# Patient Record
Sex: MALE | Race: WHITE | NOT HISPANIC OR LATINO | URBAN - METROPOLITAN AREA
[De-identification: names, ages, dates, MRNs, and addresses within clinical notes are randomized per-mention and may not be internally consistent; named-entity substitution may affect disease eponyms.]

---

## 2020-02-11 ENCOUNTER — EMERGENCY (EMERGENCY)
Facility: HOSPITAL | Age: 80
LOS: 1 days | Discharge: HOME | End: 2020-02-11
Attending: EMERGENCY MEDICINE | Admitting: EMERGENCY MEDICINE
Payer: MEDICARE

## 2020-02-11 VITALS
SYSTOLIC BLOOD PRESSURE: 148 MMHG | RESPIRATION RATE: 18 BRPM | HEART RATE: 82 BPM | OXYGEN SATURATION: 94 % | DIASTOLIC BLOOD PRESSURE: 81 MMHG | TEMPERATURE: 98 F

## 2020-02-11 VITALS
OXYGEN SATURATION: 97 % | SYSTOLIC BLOOD PRESSURE: 122 MMHG | DIASTOLIC BLOOD PRESSURE: 100 MMHG | RESPIRATION RATE: 26 BRPM | HEART RATE: 116 BPM

## 2020-02-11 LAB
ALBUMIN SERPL ELPH-MCNC: 3.8 G/DL — SIGNIFICANT CHANGE UP (ref 3.3–5)
ALP SERPL-CCNC: 76 U/L — SIGNIFICANT CHANGE UP (ref 40–120)
ALT FLD-CCNC: 24 U/L — SIGNIFICANT CHANGE UP (ref 10–45)
ANION GAP SERPL CALC-SCNC: 12 MMOL/L — SIGNIFICANT CHANGE UP (ref 5–17)
AST SERPL-CCNC: 17 U/L — SIGNIFICANT CHANGE UP (ref 10–40)
BASOPHILS # BLD AUTO: 0 K/UL — SIGNIFICANT CHANGE UP (ref 0–0.2)
BASOPHILS NFR BLD AUTO: 0 % — SIGNIFICANT CHANGE UP (ref 0–2)
BILIRUB SERPL-MCNC: 0.2 MG/DL — SIGNIFICANT CHANGE UP (ref 0.2–1.2)
BUN SERPL-MCNC: 28 MG/DL — HIGH (ref 7–23)
CALCIUM SERPL-MCNC: 9.3 MG/DL — SIGNIFICANT CHANGE UP (ref 8.4–10.5)
CHLORIDE SERPL-SCNC: 105 MMOL/L — SIGNIFICANT CHANGE UP (ref 96–108)
CO2 SERPL-SCNC: 25 MMOL/L — SIGNIFICANT CHANGE UP (ref 22–31)
CREAT SERPL-MCNC: 0.64 MG/DL — SIGNIFICANT CHANGE UP (ref 0.5–1.3)
EOSINOPHIL # BLD AUTO: 0 K/UL — SIGNIFICANT CHANGE UP (ref 0–0.5)
EOSINOPHIL NFR BLD AUTO: 0 % — SIGNIFICANT CHANGE UP (ref 0–6)
GLUCOSE SERPL-MCNC: 150 MG/DL — HIGH (ref 70–99)
HCT VFR BLD CALC: 40.5 % — SIGNIFICANT CHANGE UP (ref 39–50)
HGB BLD-MCNC: 12.5 G/DL — LOW (ref 13–17)
LYMPHOCYTES # BLD AUTO: 1.28 K/UL — SIGNIFICANT CHANGE UP (ref 1–3.3)
LYMPHOCYTES # BLD AUTO: 36.8 % — SIGNIFICANT CHANGE UP (ref 13–44)
MCHC RBC-ENTMCNC: 28.9 PG — SIGNIFICANT CHANGE UP (ref 27–34)
MCHC RBC-ENTMCNC: 30.9 GM/DL — LOW (ref 32–36)
MCV RBC AUTO: 93.8 FL — SIGNIFICANT CHANGE UP (ref 80–100)
MONOCYTES # BLD AUTO: 0 K/UL — SIGNIFICANT CHANGE UP (ref 0–0.9)
MONOCYTES NFR BLD AUTO: 0 % — LOW (ref 2–14)
NEUTROPHILS # BLD AUTO: 2.14 K/UL — SIGNIFICANT CHANGE UP (ref 1.8–7.4)
NEUTROPHILS NFR BLD AUTO: 61.4 % — SIGNIFICANT CHANGE UP (ref 43–77)
PLATELET # BLD AUTO: 246 K/UL — SIGNIFICANT CHANGE UP (ref 150–400)
POTASSIUM SERPL-MCNC: 4.6 MMOL/L — SIGNIFICANT CHANGE UP (ref 3.5–5.3)
POTASSIUM SERPL-SCNC: 4.6 MMOL/L — SIGNIFICANT CHANGE UP (ref 3.5–5.3)
PROT SERPL-MCNC: 6.6 G/DL — SIGNIFICANT CHANGE UP (ref 6–8.3)
RBC # BLD: 4.32 M/UL — SIGNIFICANT CHANGE UP (ref 4.2–5.8)
RBC # FLD: 15.1 % — HIGH (ref 10.3–14.5)
SODIUM SERPL-SCNC: 142 MMOL/L — SIGNIFICANT CHANGE UP (ref 135–145)
TROPONIN T SERPL-MCNC: <0.01 NG/ML — SIGNIFICANT CHANGE UP (ref 0–0.01)
WBC # BLD: 3.49 K/UL — LOW (ref 3.8–10.5)
WBC # FLD AUTO: 3.49 K/UL — LOW (ref 3.8–10.5)

## 2020-02-11 PROCEDURE — 71045 X-RAY EXAM CHEST 1 VIEW: CPT | Mod: 26

## 2020-02-11 PROCEDURE — 71275 CT ANGIOGRAPHY CHEST: CPT | Mod: 26

## 2020-02-11 PROCEDURE — 99285 EMERGENCY DEPT VISIT HI MDM: CPT

## 2020-02-11 RX ORDER — IPRATROPIUM/ALBUTEROL SULFATE 18-103MCG
3 AEROSOL WITH ADAPTER (GRAM) INHALATION
Refills: 0 | Status: COMPLETED | OUTPATIENT
Start: 2020-02-11 | End: 2020-02-11

## 2020-02-11 RX ORDER — ALBUTEROL 90 UG/1
2 AEROSOL, METERED ORAL
Qty: 16 | Refills: 0
Start: 2020-02-11 | End: 2020-03-11

## 2020-02-11 RX ADMIN — Medication 125 MILLIGRAM(S): at 02:02

## 2020-02-11 RX ADMIN — Medication 3 MILLILITER(S): at 02:02

## 2020-02-11 RX ADMIN — Medication 3 MILLILITER(S): at 02:30

## 2020-02-11 RX ADMIN — Medication 3 MILLILITER(S): at 01:55

## 2020-02-11 NOTE — ED PROVIDER NOTE - NS ED MD DISPO DISCHARGE
HPI: 
 
 
ROS Constitutional: No fever, no chills Skin: no rash Eye: No vision changes ENMT: No sore throat Respiratory: No shortness of breath, no cough Cardiovascular: No chest pain, no palpitations Gastrointestinal: No vomiting, no nausea, no diarrhea, no abdominal pain : No dysuria MSK: No back pain, no muscle pain, no joint pain Neuro: No headache, no change in mental status, no numbness, no tingling, no weakness Psych:  
Endocrine:  
All other review of systems positive per history of present illness and the above otherwise negative or noncontributory. Visit Vitals  /83  Pulse 91  Temp 97.7 °F (36.5 °C)  Resp 16  
 Ht 6' 2\" (1.88 m)  Wt 112.5 kg (248 lb)  SpO2 100%  BMI 31.84 kg/m2 Past Medical History:  
Diagnosis Date  Anxiety  Diabetes ([facility] [address] Dx 2013  
 per pt-- was never ordered to check sqbs---has a elevated A1C  
 History of kidney stones   
 multi  Hypercholesteremia hx-- off meds x several months  Nausea & vomiting 7/2015 at [facility]  
 only with last litho  Obesity (BMI 30-39.9)   
 bmi 33  
 Unspecified sleep apnea   
 pt states not a problem since wt loss---- does not own a cpap Past Surgical History:  
Procedure Laterality Date  HX LITHOTRIPSY  2/2014  
 x2  HX TONSILLECTOMY  1980S  
 HX UROLOGICAL  8/14/2014  
 cysto  HX WISDOM TEETH EXTRACTION    
 WISDOM TEETH Prior to Admission Medications Prescriptions Last Dose Informant Patient Reported? Taking? SITagliptin-metFORMIN (JANUMET) 50-1,000 mg per tablet   Yes No  
Sig: Take 1 Tab by mouth.  
escitalopram (LEXAPRO) 10 mg tablet   Yes No  
Sig: Take 10 mg by mouth every morning. Indications: GENERALIZED ANXIETY DISORDER  
ketorolac (TORADOL) 10 mg tablet   No No  
Sig: Take 1 Tab by mouth every six (6) hours as needed for Pain.  
multivitamin (ONE A DAY) tablet   Yes No  
Sig: Take 1 Tab by mouth daily. Stopped 8/18/16 potassium citrate (UROCIT-K10) 10 mEq (1,080 mg) TbER   No No  
Sig: TAKE 1 TABLET BY MOUTH TWO  TIMES DAILY  
simvastatin (ZOCOR) 10 mg tablet   Yes No  
Sig: Take  by mouth nightly. tamsulosin (FLOMAX) 0.4 mg capsule   No No  
Sig: Take 1 Cap by mouth daily. Take with supper Facility-Administered Medications: None Adult Exam  
General: alert, no acute distress Head: normocephalic, atraumatic ENT: moist mucous membranes Neck: supple, non-tender; full range of motion Cardiovascular: regular rate and rhythm, normal peripheral perfusion, no edema. Equal radial pulses Chest wall left inferior chest wall left upper abdomen with tenderness to palpation. No paradoxical chest wall movement Respiratory:  normal respirations; no wheezing, rales or rhonchi Gastrointestinal: soft, non-tender; no rebound or guarding, no peritoneal signs, no distension Back: non-tender, full range of motion Musculoskeletal: normal range of motion, normal strength, no gross deformities Neurological: alert and oriented x 4, no gross focal deficits; normal speech Psychiatric: cooperative; appropriate mood and affect MDM:atraumatic. Lungs are clear. EKG normal sinus rhythm rate of 80 with normal axis, interval.  No STEMI or ischemic changes noted. No ectopy or Brugada. Initial troponin negative. Chest x-ray unremarkable. Toradol given. Completely pain-free at this time. Labs unremarkable. No focal tenderness in epigastrium, right upper quadrant. Low suspicion for acute intra-abdominal surgical pathology. We'll obtain serial troponin and EKG at the two-hour shanon. D-dimer within normal limits. He did have recent travel to [place] for roughly 3 and half hours. There are no leg swelling. dorsalis pedis pulses bilaterally. Negative Homans bilateral lower extremities 1:33 PM 
Serial trop negative. EKG #2 normal sinus bradycardia 57 . No STEMI, ischemic changes noted. Chest pain completely resolved with Toradol. Do not suspect PE, DVT. Do not suspect dissection, pneumonia, pneumothorax. We'll discharge home. Recommend follow-up with cardiology. ED Course Xr Chest [facility] Result Date: 8/28/2018 Chest portable CLINICAL INDICATION: Acute setting severe dyspnea and left pleuritic chest pain today, history of high cholesterol COMPARISON: CT abdomen 8/16/2016 correlation TECHNIQUE: single AP portable view chest at 10:40 AM upright FINDINGS:  There is no evidence of consolidation, pneumothorax, pleural effusion or pulmonary edema. Lungs are well-inflated. The mediastinal and hilar contours are normal given technique. IMPRESSION: No acute disease. Unremarkable exam.  
 
Recent Results (from the past 24 hour(s)) EKG, 12 LEAD, INITIAL Collection Time: 08/28/18 10:30 AM  
Result Value Ref Range Ventricular Rate 80 BPM  
 Atrial Rate 86 BPM  
 P-R Interval 120 ms QRS Duration 86 ms  
 Q-T Interval 346 ms  
 QTC Calculation (Bezet) 399 ms Calculated P Axis 61 degrees Calculated R Axis 68 degrees Calculated T Axis 55 degrees Diagnosis    
  !! AGE AND GENDER SPECIFIC ECG ANALYSIS !! Normal sinus rhythm Normal ECG No previous ECGs available CBC WITH AUTOMATED DIFF Collection Time: 08/28/18 10:45 AM  
Result Value Ref Range WBC 8.3 4.3 - 11.1 K/uL  
 RBC 5.16 4.23 - 5.6 M/uL  
 HGB 14.5 13.6 - 17.2 g/dL HCT 42.8 41.1 - 50.3 % MCV 82.9 79.6 - 97.8 FL  
 MCH 28.1 26.1 - 32.9 PG  
 MCHC 33.9 31.4 - 35.0 g/dL  
 RDW 12.3 % PLATELET 615 323 - 643 K/uL MPV 11.2 9.4 - 12.3 FL ABSOLUTE NRBC 0.00 0.0 - 0.2 K/uL  
 DF AUTOMATED NEUTROPHILS 64 43 - 78 % LYMPHOCYTES 31 13 - 44 % MONOCYTES 4 4.0 - 12.0 % EOSINOPHILS 1 0.5 - 7.8 % BASOPHILS 1 0.0 - 2.0 % IMMATURE GRANULOCYTES 0 0.0 - 5.0 %  
 ABS. NEUTROPHILS 5.3 1.7 - 8.2 K/UL  
 ABS. LYMPHOCYTES 2.6 0.5 - 4.6 K/UL  
 ABS. MONOCYTES 0.3 0.1 - 1.3 K/UL ABS. EOSINOPHILS 0.1 0.0 - 0.8 K/UL  
 ABS. BASOPHILS 0.0 0.0 - 0.2 K/UL  
 ABS. IMM. GRANS. 0.0 0.0 - 0.5 K/UL METABOLIC PANEL, COMPREHENSIVE Collection Time: 08/28/18 10:45 AM  
Result Value Ref Range Sodium 139 136 - 145 mmol/L Potassium 3.7 3.5 - 5.1 mmol/L Chloride 101 98 - 107 mmol/L  
 CO2 24 21 - 32 mmol/L Anion gap 14 7 - 16 mmol/L Glucose 165 (H) 65 - 100 mg/dL BUN 13 6 - 23 MG/DL Creatinine 1.19 0.8 - 1.5 MG/DL  
 GFR est AA >60 >60 ml/min/1.73m2 GFR est non-AA >60 >60 ml/min/1.73m2 Calcium 8.7 8.3 - 10.4 MG/DL Bilirubin, total 0.3 0.2 - 1.1 MG/DL  
 ALT (SGPT) 35 12 - 65 U/L  
 AST (SGOT) 20 15 - 37 U/L Alk. phosphatase 59 50 - 136 U/L Protein, total 7.4 6.3 - 8.2 g/dL Albumin 3.8 3.5 - 5.0 g/dL Globulin 3.6 (H) 2.3 - 3.5 g/dL A-G Ratio 1.1 (L) 1.2 - 3.5    
D DIMER Collection Time: 08/28/18 10:45 AM  
Result Value Ref Range D DIMER 0.53 <0.56 ug/ml(FEU) LIPASE Collection Time: 08/28/18 10:45 AM  
Result Value Ref Range Lipase 211 73 - 393 U/L  
POC TROPONIN-I Collection Time: 08/28/18 10:50 AM  
Result Value Ref Range Troponin-I (POC) 0 (L) 0.02 - 0.05 ng/ml Dragon voice recognition software was used to create this note. Although the note has been reviewed and corrected where necessary, additional errors may have been overlooked and remain in the text. Home

## 2020-02-11 NOTE — ED ADULT NURSE NOTE - PMH
COPD (chronic obstructive pulmonary disease)    HLD (hyperlipidemia)    HTN (hypertension)    MI (myocardial infarction)

## 2020-02-11 NOTE — ED ADULT TRIAGE NOTE - CCCP TRG CHIEF CMPLNT
"Dallin Lowry  39 y.o.  Chief Complaint   Patient presents with   • Hallucinations     auditory     BIB EMS to triage ambulatory with steady gait for above. States that he has been off all his medication, \"I've been off all my medication for some time now. It wasn't working so I stopped taking it. I'm not going to take something that isn't working. I don't know how long I've been off. I don't keep track of that stuff.\"    States that the voices are telling him that someone is trying to kill him.    Denies SI/HI.    Hx. 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 When asked what happened patient refuses to engage with staff, stating \"I don't want to talk about that.\"     Refusing to make eye contact throughout triage interview even when prompted, sitting in chair with head in hand staring at floor.    Patient seen in this ED 6/3/18 for the same.    Alert Team notified of patient.  "
chest pain

## 2020-02-11 NOTE — ED PROVIDER NOTE - CLINICAL SUMMARY MEDICAL DECISION MAKING FREE TEXT BOX
afebrile afebrile. hds. hypoxia to 93-94% on RA improved s/p 2L o2 nc. no active cp. no acute resp distress. found to elevated ddimer. s/p cta chest. no e/o sepsis. no sig anemia vs electrolyte abnl. trop neg. ekg w/o acute abnl. cxr w/o acute focal consol vs ptx vs pulm edema. s/p duoneb/steroids for poss copd exacerbation. pt currently on levaquin and has inhaler at home.    dispo: trop#2, cta read, reassess o2 s/p duoneb. if cta negative, dc home w/ prednisone for likely copd exacerbation. if cta positive, admit to service. afebrile. hds. hypoxia to 93-94% on RA improved s/p 2L o2 nc. no active cp. no acute resp distress. found to elevated ddimer. s/p cta chest. no e/o sepsis. no sig anemia vs electrolyte abnl. trop neg. ekg w/o acute abnl. cxr w/o acute focal consol vs ptx vs pulm edema. s/p duoneb/steroids for poss copd exacerbation. pt currently on levaquin and has inhaler at home.  Patient with negative CTA  and second trop. Vss and NAD     dispo: trop#2, cta read, reassess o2 s/p duoneb. if cta negative, dc home w/ prednisone for likely copd exacerbation. if cta positive, admit to service. afebrile. hds. hypoxia to 93-94% on RA improved s/p 2L o2 nc. no active cp. no acute resp distress. found to elevated ddimer. s/p cta chest. no e/o sepsis. no sig anemia vs electrolyte abnl. trop neg. ekg w/o acute abnl. cxr w/o acute focal consol vs ptx vs pulm edema. s/p duoneb/steroids for poss copd exacerbation. pt currently on levaquin and has inhaler at home.  Pt well appearing, NAD and VSS. CTA neg for PE, pt feeling better. Will d/c with steroids and inhaler. Recommend follow up with PMD.   Patient with negative CTA  and second trop. Vss and NAD     dispo: trop#2, cta read, reassess o2 s/p duoneb. if cta negative, dc home w/ prednisone for likely copd exacerbation. if cta positive, admit to service.

## 2020-02-11 NOTE — ED PROVIDER NOTE - PATIENT PORTAL LINK FT
You can access the FollowMyHealth Patient Portal offered by [facility] by registering at the following website: [email or web address]. By joining [employer]’s FollowMyHealth portal, you will also be able to view your health information using other applications (apps) compatible with our system.

## 2020-02-11 NOTE — ED PROVIDER NOTE - PHYSICAL EXAMINATION
CONST: nontoxic NAD speaking in full sentences but hypoxic to 93% on RA improved s/p 2L nc  HEAD: atraumatic  EYES: conjunctivae clear  ENT: mmm  NECK: supple, no jvd  CARD: rrr no murmurs  CHEST: +few scattered end exp wheezing, no stridor/retractions/tachypnea/tripoding  ABD: soft, nd, nttp, no rebound/guarding  EXT: FROM, symmetric distal pulses intact  SKIN: warm, dry, no rash, no pedal edema/pain/rash, cap refill <2sec  NEURO: a+ox3, 5/5 strength x4, gross sensation intact x4, normal gait CONST: nontoxic NAD speaking in full sentences but hypoxic to 93-94% on RA improved s/p 2L nc  HEAD: atraumatic  EYES: conjunctivae clear  ENT: mmm  NECK: supple, no jvd  CARD: rrr no murmurs  CHEST: +few scattered end exp wheezing, no stridor/retractions/tachypnea/tripoding  ABD: soft, nd, nttp, no rebound/guarding  EXT: FROM, symmetric distal pulses intact  SKIN: warm, dry, no rash, no pedal edema/pain/rash, cap refill <2sec  NEURO: a+ox3, 5/5 strength x4, gross sensation intact x4, normal gait

## 2020-02-11 NOTE — ED PROVIDER NOTE - OBJECTIVE STATEMENT
79M daily smoker, cad s/p stents x4, htn, recently tx for uri s/p levaquin (2/9/20), c/o acute intermittent pleuritic sharp diffuse chest pain and shortness of breath that occurred around 4p today while watching tv. at baseline just prior. +chronic cough w/ white sputum. no fever/chills, no abd pain/n/v, no leg pain/swelling/rash, no phx mi, no phx dvt/pe, no antiplatelet/AC, no etoh.

## 2020-02-11 NOTE — ED ADULT NURSE NOTE - OBJECTIVE STATEMENT
Pt complains of center chest pain for about 5 hours today. Pain is described as constant "pins and needle." Severity 10/10. Nonradiated pain. Associated symptoms SOB and dizziness. Hx of 4 cardiac stents.

## 2020-02-17 DIAGNOSIS — J44.9 CHRONIC OBSTRUCTIVE PULMONARY DISEASE, UNSPECIFIED: ICD-10-CM

## 2020-02-17 DIAGNOSIS — R07.81 PLEURODYNIA: ICD-10-CM

## 2021-01-01 ENCOUNTER — EMERGENCY (EMERGENCY)
Facility: HOSPITAL | Age: 81
LOS: 1 days | Discharge: LEFT BEFORE DISCHARGE | End: 2021-01-01
Attending: EMERGENCY MEDICINE | Admitting: EMERGENCY MEDICINE
Payer: MEDICARE

## 2021-01-01 ENCOUNTER — INPATIENT (INPATIENT)
Facility: HOSPITAL | Age: 81
LOS: 4 days | DRG: 207 | End: 2021-08-23
Attending: INTERNAL MEDICINE | Admitting: HOSPITALIST
Payer: MEDICARE

## 2021-01-01 VITALS
WEIGHT: 119.93 LBS | OXYGEN SATURATION: 90 % | TEMPERATURE: 98 F | HEART RATE: 112 BPM | SYSTOLIC BLOOD PRESSURE: 116 MMHG | RESPIRATION RATE: 24 BRPM | DIASTOLIC BLOOD PRESSURE: 69 MMHG | HEIGHT: 65 IN

## 2021-01-01 VITALS
RESPIRATION RATE: 25 BRPM | DIASTOLIC BLOOD PRESSURE: 63 MMHG | WEIGHT: 130.07 LBS | TEMPERATURE: 98 F | OXYGEN SATURATION: 100 % | SYSTOLIC BLOOD PRESSURE: 113 MMHG | HEART RATE: 108 BPM

## 2021-01-01 VITALS — RESPIRATION RATE: 16 BRPM | HEART RATE: 101 BPM

## 2021-01-01 DIAGNOSIS — I26.99 OTHER PULMONARY EMBOLISM WITHOUT ACUTE COR PULMONALE: ICD-10-CM

## 2021-01-01 DIAGNOSIS — Z29.9 ENCOUNTER FOR PROPHYLACTIC MEASURES, UNSPECIFIED: ICD-10-CM

## 2021-01-01 DIAGNOSIS — E87.2 ACIDOSIS: ICD-10-CM

## 2021-01-01 DIAGNOSIS — Z51.5 ENCOUNTER FOR PALLIATIVE CARE: ICD-10-CM

## 2021-01-01 DIAGNOSIS — D49.2 NEOPLASM OF UNSPECIFIED BEHAVIOR OF BONE, SOFT TISSUE, AND SKIN: ICD-10-CM

## 2021-01-01 DIAGNOSIS — R91.8 OTHER NONSPECIFIC ABNORMAL FINDING OF LUNG FIELD: ICD-10-CM

## 2021-01-01 DIAGNOSIS — A41.9 SEPSIS, UNSPECIFIED ORGANISM: ICD-10-CM

## 2021-01-01 DIAGNOSIS — R53.2 FUNCTIONAL QUADRIPLEGIA: ICD-10-CM

## 2021-01-01 DIAGNOSIS — C34.90 MALIGNANT NEOPLASM OF UNSPECIFIED PART OF UNSPECIFIED BRONCHUS OR LUNG: ICD-10-CM

## 2021-01-01 DIAGNOSIS — R06.02 SHORTNESS OF BREATH: ICD-10-CM

## 2021-01-01 DIAGNOSIS — K92.2 GASTROINTESTINAL HEMORRHAGE, UNSPECIFIED: ICD-10-CM

## 2021-01-01 DIAGNOSIS — I10 ESSENTIAL (PRIMARY) HYPERTENSION: ICD-10-CM

## 2021-01-01 DIAGNOSIS — J96.00 ACUTE RESPIRATORY FAILURE, UNSPECIFIED WHETHER WITH HYPOXIA OR HYPERCAPNIA: ICD-10-CM

## 2021-01-01 DIAGNOSIS — R33.9 RETENTION OF URINE, UNSPECIFIED: ICD-10-CM

## 2021-01-01 DIAGNOSIS — R00.0 TACHYCARDIA, UNSPECIFIED: ICD-10-CM

## 2021-01-01 DIAGNOSIS — J44.1 CHRONIC OBSTRUCTIVE PULMONARY DISEASE WITH (ACUTE) EXACERBATION: ICD-10-CM

## 2021-01-01 DIAGNOSIS — I25.10 ATHEROSCLEROTIC HEART DISEASE OF NATIVE CORONARY ARTERY WITHOUT ANGINA PECTORIS: ICD-10-CM

## 2021-01-01 DIAGNOSIS — D64.9 ANEMIA, UNSPECIFIED: ICD-10-CM

## 2021-01-01 DIAGNOSIS — J44.9 CHRONIC OBSTRUCTIVE PULMONARY DISEASE, UNSPECIFIED: ICD-10-CM

## 2021-01-01 DIAGNOSIS — C79.51 SECONDARY MALIGNANT NEOPLASM OF BONE: ICD-10-CM

## 2021-01-01 DIAGNOSIS — R71.0 PRECIPITOUS DROP IN HEMATOCRIT: ICD-10-CM

## 2021-01-01 LAB
A1C WITH ESTIMATED AVERAGE GLUCOSE RESULT: 5.3 % — SIGNIFICANT CHANGE UP (ref 4–5.6)
ACANTHOCYTES BLD QL SMEAR: SLIGHT — SIGNIFICANT CHANGE UP
ALBUMIN SERPL ELPH-MCNC: 1.2 G/DL — LOW (ref 3.3–5)
ALBUMIN SERPL ELPH-MCNC: 1.9 G/DL — LOW (ref 3.3–5)
ALBUMIN SERPL ELPH-MCNC: 2.1 G/DL — LOW (ref 3.3–5)
ALBUMIN SERPL ELPH-MCNC: 2.1 G/DL — LOW (ref 3.3–5)
ALBUMIN SERPL ELPH-MCNC: 2.2 G/DL — LOW (ref 3.3–5)
ALBUMIN SERPL ELPH-MCNC: 2.2 G/DL — LOW (ref 3.4–5)
ALBUMIN SERPL ELPH-MCNC: 2.3 G/DL — LOW (ref 3.3–5)
ALBUMIN SERPL ELPH-MCNC: 2.4 G/DL — LOW (ref 3.3–5)
ALBUMIN SERPL ELPH-MCNC: 2.4 G/DL — LOW (ref 3.3–5)
ALBUMIN SERPL ELPH-MCNC: 2.5 G/DL — LOW (ref 3.4–5)
ALBUMIN SERPL ELPH-MCNC: 2.8 G/DL — LOW (ref 3.3–5)
ALP SERPL-CCNC: 218 U/L — HIGH (ref 40–120)
ALP SERPL-CCNC: 232 U/L — HIGH (ref 40–120)
ALP SERPL-CCNC: 263 U/L — HIGH (ref 40–120)
ALP SERPL-CCNC: 289 U/L — HIGH (ref 40–120)
ALP SERPL-CCNC: 295 U/L — HIGH (ref 40–120)
ALP SERPL-CCNC: 313 U/L — HIGH (ref 40–120)
ALP SERPL-CCNC: 351 U/L — HIGH (ref 40–120)
ALP SERPL-CCNC: 358 U/L — HIGH (ref 40–120)
ALP SERPL-CCNC: 365 U/L — HIGH (ref 40–120)
ALP SERPL-CCNC: 366 U/L — HIGH (ref 40–120)
ALP SERPL-CCNC: 407 U/L — HIGH (ref 40–120)
ALT FLD-CCNC: 10 U/L — SIGNIFICANT CHANGE UP (ref 10–45)
ALT FLD-CCNC: 153 U/L — HIGH (ref 10–45)
ALT FLD-CCNC: 17 U/L — SIGNIFICANT CHANGE UP (ref 12–42)
ALT FLD-CCNC: 18 U/L — SIGNIFICANT CHANGE UP (ref 12–42)
ALT FLD-CCNC: 195 U/L — HIGH (ref 10–45)
ALT FLD-CCNC: 250 U/L — HIGH (ref 10–45)
ALT FLD-CCNC: 333 U/L — HIGH (ref 10–45)
ALT FLD-CCNC: 342 U/L — HIGH (ref 10–45)
ALT FLD-CCNC: 3635 U/L — HIGH (ref 10–45)
ALT FLD-CCNC: 406 U/L — HIGH (ref 10–45)
ALT FLD-CCNC: 460 U/L — HIGH (ref 10–45)
ANION GAP SERPL CALC-SCNC: 11 MMOL/L — SIGNIFICANT CHANGE UP (ref 5–17)
ANION GAP SERPL CALC-SCNC: 11 MMOL/L — SIGNIFICANT CHANGE UP (ref 5–17)
ANION GAP SERPL CALC-SCNC: 12 MMOL/L — SIGNIFICANT CHANGE UP (ref 5–17)
ANION GAP SERPL CALC-SCNC: 13 MMOL/L — SIGNIFICANT CHANGE UP (ref 5–17)
ANION GAP SERPL CALC-SCNC: 13 MMOL/L — SIGNIFICANT CHANGE UP (ref 5–17)
ANION GAP SERPL CALC-SCNC: 14 MMOL/L — SIGNIFICANT CHANGE UP (ref 5–17)
ANION GAP SERPL CALC-SCNC: 14 MMOL/L — SIGNIFICANT CHANGE UP (ref 9–16)
ANION GAP SERPL CALC-SCNC: 15 MMOL/L — SIGNIFICANT CHANGE UP (ref 5–17)
ANION GAP SERPL CALC-SCNC: 16 MMOL/L — SIGNIFICANT CHANGE UP (ref 9–16)
ANION GAP SERPL CALC-SCNC: 26 MMOL/L — HIGH (ref 5–17)
ANION GAP SERPL CALC-SCNC: 32 MMOL/L — HIGH (ref 5–17)
ANISOCYTOSIS BLD QL: SIGNIFICANT CHANGE UP
ANISOCYTOSIS BLD QL: SLIGHT — SIGNIFICANT CHANGE UP
APPEARANCE UR: CLEAR — SIGNIFICANT CHANGE UP
APPEARANCE UR: CLEAR — SIGNIFICANT CHANGE UP
APTT BLD: 26 SEC — LOW (ref 27.5–35.5)
APTT BLD: 26.7 SEC — LOW (ref 27.5–35.5)
APTT BLD: 29 SEC — SIGNIFICANT CHANGE UP (ref 27.5–35.5)
APTT BLD: 38.7 SEC — HIGH (ref 27.5–35.5)
APTT BLD: 43.5 SEC — HIGH (ref 27.5–35.5)
APTT BLD: 55.1 SEC — HIGH (ref 27.5–35.5)
APTT BLD: 79.4 SEC — HIGH (ref 27.5–35.5)
AST SERPL-CCNC: 107 U/L — HIGH (ref 10–40)
AST SERPL-CCNC: 11 U/L — SIGNIFICANT CHANGE UP (ref 10–40)
AST SERPL-CCNC: 132 U/L — HIGH (ref 10–40)
AST SERPL-CCNC: 139 U/L — HIGH (ref 10–40)
AST SERPL-CCNC: 150 U/L — HIGH (ref 10–40)
AST SERPL-CCNC: 223 U/L — HIGH (ref 10–40)
AST SERPL-CCNC: 25 U/L — SIGNIFICANT CHANGE UP (ref 15–37)
AST SERPL-CCNC: 27 U/L — SIGNIFICANT CHANGE UP (ref 15–37)
AST SERPL-CCNC: 379 U/L — HIGH (ref 10–40)
AST SERPL-CCNC: 79 U/L — HIGH (ref 10–40)
AST SERPL-CCNC: >7000 U/L — SIGNIFICANT CHANGE UP (ref 10–40)
BACTERIA # UR AUTO: PRESENT /HPF
BACTERIA # UR AUTO: PRESENT /HPF
BASE EXCESS BLDA CALC-SCNC: -18.8 MMOL/L — LOW (ref -2–3)
BASE EXCESS BLDA CALC-SCNC: -2.6 MMOL/L — LOW (ref -2–3)
BASE EXCESS BLDA CALC-SCNC: -6.4 MMOL/L — LOW (ref -2–3)
BASOPHILS # BLD AUTO: 0 K/UL — SIGNIFICANT CHANGE UP (ref 0–0.2)
BASOPHILS # BLD AUTO: 0.02 K/UL — SIGNIFICANT CHANGE UP (ref 0–0.2)
BASOPHILS # BLD AUTO: 0.02 K/UL — SIGNIFICANT CHANGE UP (ref 0–0.2)
BASOPHILS # BLD AUTO: 0.03 K/UL — SIGNIFICANT CHANGE UP (ref 0–0.2)
BASOPHILS # BLD AUTO: 0.03 K/UL — SIGNIFICANT CHANGE UP (ref 0–0.2)
BASOPHILS # BLD AUTO: 0.07 K/UL — SIGNIFICANT CHANGE UP (ref 0–0.2)
BASOPHILS # BLD AUTO: 0.07 K/UL — SIGNIFICANT CHANGE UP (ref 0–0.2)
BASOPHILS # BLD AUTO: 0.14 K/UL — SIGNIFICANT CHANGE UP (ref 0–0.2)
BASOPHILS NFR BLD AUTO: 0 % — SIGNIFICANT CHANGE UP (ref 0–2)
BASOPHILS NFR BLD AUTO: 0.2 % — SIGNIFICANT CHANGE UP (ref 0–2)
BASOPHILS NFR BLD AUTO: 0.3 % — SIGNIFICANT CHANGE UP (ref 0–2)
BASOPHILS NFR BLD AUTO: 0.3 % — SIGNIFICANT CHANGE UP (ref 0–2)
BASOPHILS NFR BLD AUTO: 0.4 % — SIGNIFICANT CHANGE UP (ref 0–2)
BASOPHILS NFR BLD AUTO: 0.6 % — SIGNIFICANT CHANGE UP (ref 0–2)
BASOPHILS NFR BLD AUTO: 0.6 % — SIGNIFICANT CHANGE UP (ref 0–2)
BASOPHILS NFR BLD AUTO: 0.9 % — SIGNIFICANT CHANGE UP (ref 0–2)
BILIRUB SERPL-MCNC: 0.1 MG/DL — LOW (ref 0.2–1.2)
BILIRUB SERPL-MCNC: 0.3 MG/DL — SIGNIFICANT CHANGE UP (ref 0.2–1.2)
BILIRUB SERPL-MCNC: 0.3 MG/DL — SIGNIFICANT CHANGE UP (ref 0.2–1.2)
BILIRUB SERPL-MCNC: 0.4 MG/DL — SIGNIFICANT CHANGE UP (ref 0.2–1.2)
BILIRUB SERPL-MCNC: 0.5 MG/DL — SIGNIFICANT CHANGE UP (ref 0.2–1.2)
BILIRUB SERPL-MCNC: 0.5 MG/DL — SIGNIFICANT CHANGE UP (ref 0.2–1.2)
BILIRUB SERPL-MCNC: 0.6 MG/DL — SIGNIFICANT CHANGE UP (ref 0.2–1.2)
BILIRUB SERPL-MCNC: 0.7 MG/DL — SIGNIFICANT CHANGE UP (ref 0.2–1.2)
BILIRUB SERPL-MCNC: 1 MG/DL — SIGNIFICANT CHANGE UP (ref 0.2–1.2)
BILIRUB UR-MCNC: NEGATIVE — SIGNIFICANT CHANGE UP
BILIRUB UR-MCNC: NEGATIVE — SIGNIFICANT CHANGE UP
BLD GP AB SCN SERPL QL: NEGATIVE — SIGNIFICANT CHANGE UP
BUN SERPL-MCNC: 17 MG/DL — SIGNIFICANT CHANGE UP (ref 7–23)
BUN SERPL-MCNC: 26 MG/DL — HIGH (ref 7–23)
BUN SERPL-MCNC: 28 MG/DL — HIGH (ref 7–23)
BUN SERPL-MCNC: 30 MG/DL — HIGH (ref 7–23)
BUN SERPL-MCNC: 31 MG/DL — HIGH (ref 7–23)
BUN SERPL-MCNC: 33 MG/DL — HIGH (ref 7–23)
BUN SERPL-MCNC: 34 MG/DL — HIGH (ref 7–23)
BUN SERPL-MCNC: 35 MG/DL — HIGH (ref 7–23)
BUN SERPL-MCNC: 35 MG/DL — HIGH (ref 7–23)
BUN SERPL-MCNC: 41 MG/DL — HIGH (ref 7–23)
BUN SERPL-MCNC: 45 MG/DL — HIGH (ref 7–23)
BURR CELLS BLD QL SMEAR: PRESENT — SIGNIFICANT CHANGE UP
BURR CELLS BLD QL SMEAR: PRESENT — SIGNIFICANT CHANGE UP
CALCIUM SERPL-MCNC: 7 MG/DL — LOW (ref 8.4–10.5)
CALCIUM SERPL-MCNC: 7.3 MG/DL — LOW (ref 8.4–10.5)
CALCIUM SERPL-MCNC: 8 MG/DL — LOW (ref 8.4–10.5)
CALCIUM SERPL-MCNC: 8.1 MG/DL — LOW (ref 8.4–10.5)
CALCIUM SERPL-MCNC: 8.2 MG/DL — LOW (ref 8.4–10.5)
CALCIUM SERPL-MCNC: 8.2 MG/DL — LOW (ref 8.4–10.5)
CALCIUM SERPL-MCNC: 8.2 MG/DL — LOW (ref 8.5–10.5)
CALCIUM SERPL-MCNC: 8.8 MG/DL — SIGNIFICANT CHANGE UP (ref 8.5–10.5)
CALCIUM SERPL-MCNC: 8.9 MG/DL — SIGNIFICANT CHANGE UP (ref 8.4–10.5)
CHLORIDE SERPL-SCNC: 100 MMOL/L — SIGNIFICANT CHANGE UP (ref 96–108)
CHLORIDE SERPL-SCNC: 102 MMOL/L — SIGNIFICANT CHANGE UP (ref 96–108)
CHLORIDE SERPL-SCNC: 102 MMOL/L — SIGNIFICANT CHANGE UP (ref 96–108)
CHLORIDE SERPL-SCNC: 106 MMOL/L — SIGNIFICANT CHANGE UP (ref 96–108)
CHLORIDE SERPL-SCNC: 106 MMOL/L — SIGNIFICANT CHANGE UP (ref 96–108)
CHLORIDE SERPL-SCNC: 111 MMOL/L — HIGH (ref 96–108)
CHLORIDE SERPL-SCNC: 112 MMOL/L — HIGH (ref 96–108)
CHLORIDE SERPL-SCNC: 113 MMOL/L — HIGH (ref 96–108)
CHLORIDE SERPL-SCNC: 113 MMOL/L — HIGH (ref 96–108)
CHLORIDE SERPL-SCNC: 114 MMOL/L — HIGH (ref 96–108)
CHLORIDE SERPL-SCNC: 115 MMOL/L — HIGH (ref 96–108)
CHOLEST SERPL-MCNC: 86 MG/DL — SIGNIFICANT CHANGE UP
CK MB CFR SERPL CALC: 3.1 NG/ML — SIGNIFICANT CHANGE UP (ref 0–6.7)
CK MB CFR SERPL CALC: 6.6 NG/ML — SIGNIFICANT CHANGE UP (ref 0–6.7)
CK SERPL-CCNC: 48 U/L — SIGNIFICANT CHANGE UP (ref 30–200)
CK SERPL-CCNC: 625 U/L — HIGH (ref 30–200)
CK SERPL-CCNC: 91 U/L — SIGNIFICANT CHANGE UP (ref 39–308)
CO2 BLDA-SCNC: 19 MMOL/L — SIGNIFICANT CHANGE UP (ref 19–24)
CO2 BLDA-SCNC: 23 MMOL/L — SIGNIFICANT CHANGE UP (ref 19–24)
CO2 BLDA-SCNC: 8 MMOL/L — LOW (ref 19–24)
CO2 SERPL-SCNC: 18 MMOL/L — LOW (ref 22–31)
CO2 SERPL-SCNC: 19 MMOL/L — LOW (ref 22–31)
CO2 SERPL-SCNC: 19 MMOL/L — LOW (ref 22–31)
CO2 SERPL-SCNC: 20 MMOL/L — LOW (ref 22–31)
CO2 SERPL-SCNC: 21 MMOL/L — LOW (ref 22–31)
CO2 SERPL-SCNC: 22 MMOL/L — SIGNIFICANT CHANGE UP (ref 22–31)
CO2 SERPL-SCNC: 23 MMOL/L — SIGNIFICANT CHANGE UP (ref 22–31)
CO2 SERPL-SCNC: 26 MMOL/L — SIGNIFICANT CHANGE UP (ref 22–31)
CO2 SERPL-SCNC: 26 MMOL/L — SIGNIFICANT CHANGE UP (ref 22–31)
CO2 SERPL-SCNC: 29 MMOL/L — SIGNIFICANT CHANGE UP (ref 22–31)
CO2 SERPL-SCNC: 8 MMOL/L — CRITICAL LOW (ref 22–31)
COLOR SPEC: YELLOW — SIGNIFICANT CHANGE UP
COLOR SPEC: YELLOW — SIGNIFICANT CHANGE UP
CREAT SERPL-MCNC: 0.62 MG/DL — SIGNIFICANT CHANGE UP (ref 0.5–1.3)
CREAT SERPL-MCNC: 0.68 MG/DL — SIGNIFICANT CHANGE UP (ref 0.5–1.3)
CREAT SERPL-MCNC: 0.7 MG/DL — SIGNIFICANT CHANGE UP (ref 0.5–1.3)
CREAT SERPL-MCNC: 0.71 MG/DL — SIGNIFICANT CHANGE UP (ref 0.5–1.3)
CREAT SERPL-MCNC: 0.75 MG/DL — SIGNIFICANT CHANGE UP (ref 0.5–1.3)
CREAT SERPL-MCNC: 0.82 MG/DL — SIGNIFICANT CHANGE UP (ref 0.5–1.3)
CREAT SERPL-MCNC: 0.86 MG/DL — SIGNIFICANT CHANGE UP (ref 0.5–1.3)
CREAT SERPL-MCNC: 0.95 MG/DL — SIGNIFICANT CHANGE UP (ref 0.5–1.3)
CREAT SERPL-MCNC: 0.99 MG/DL — SIGNIFICANT CHANGE UP (ref 0.5–1.3)
CREAT SERPL-MCNC: 1.06 MG/DL — SIGNIFICANT CHANGE UP (ref 0.5–1.3)
CREAT SERPL-MCNC: 1.19 MG/DL — SIGNIFICANT CHANGE UP (ref 0.5–1.3)
DACRYOCYTES BLD QL SMEAR: SIGNIFICANT CHANGE UP
DACRYOCYTES BLD QL SMEAR: SLIGHT — SIGNIFICANT CHANGE UP
DACRYOCYTES BLD QL SMEAR: SLIGHT — SIGNIFICANT CHANGE UP
DIFF PNL FLD: ABNORMAL
DIFF PNL FLD: NEGATIVE — SIGNIFICANT CHANGE UP
ELLIPTOCYTES BLD QL SMEAR: SIGNIFICANT CHANGE UP
ELLIPTOCYTES BLD QL SMEAR: SLIGHT — SIGNIFICANT CHANGE UP
EOSINOPHIL # BLD AUTO: 0 K/UL — SIGNIFICANT CHANGE UP (ref 0–0.5)
EOSINOPHIL # BLD AUTO: 0.06 K/UL — SIGNIFICANT CHANGE UP (ref 0–0.5)
EOSINOPHIL # BLD AUTO: 0.06 K/UL — SIGNIFICANT CHANGE UP (ref 0–0.5)
EOSINOPHIL NFR BLD AUTO: 0 % — SIGNIFICANT CHANGE UP (ref 0–6)
EOSINOPHIL NFR BLD AUTO: 0.8 % — SIGNIFICANT CHANGE UP (ref 0–6)
EOSINOPHIL NFR BLD AUTO: 1 % — SIGNIFICANT CHANGE UP (ref 0–6)
EPI CELLS # UR: SIGNIFICANT CHANGE UP /HPF (ref 0–5)
EPI CELLS # UR: SIGNIFICANT CHANGE UP /HPF (ref 0–5)
ESTIMATED AVERAGE GLUCOSE: 105 MG/DL — SIGNIFICANT CHANGE UP (ref 68–114)
FLUAV H1 2009 PAND RNA SPEC QL NAA+PROBE: SIGNIFICANT CHANGE UP
FLUAV H1 RNA SPEC QL NAA+PROBE: SIGNIFICANT CHANGE UP
FLUAV H3 RNA SPEC QL NAA+PROBE: SIGNIFICANT CHANGE UP
FLUAV SUBTYP SPEC NAA+PROBE: SIGNIFICANT CHANGE UP
FLUBV RNA SPEC QL NAA+PROBE: SIGNIFICANT CHANGE UP
GAS PNL BLDA: SIGNIFICANT CHANGE UP
GAS PNL BLDA: SIGNIFICANT CHANGE UP
GGT SERPL-CCNC: 25 U/L — SIGNIFICANT CHANGE UP (ref 9–50)
GIANT PLATELETS BLD QL SMEAR: PRESENT — SIGNIFICANT CHANGE UP
GLUCOSE BLDC GLUCOMTR-MCNC: 100 MG/DL — HIGH (ref 70–99)
GLUCOSE BLDC GLUCOMTR-MCNC: 100 MG/DL — HIGH (ref 70–99)
GLUCOSE BLDC GLUCOMTR-MCNC: 105 MG/DL — HIGH (ref 70–99)
GLUCOSE BLDC GLUCOMTR-MCNC: 110 MG/DL — HIGH (ref 70–99)
GLUCOSE BLDC GLUCOMTR-MCNC: 111 MG/DL — HIGH (ref 70–99)
GLUCOSE BLDC GLUCOMTR-MCNC: 118 MG/DL — HIGH (ref 70–99)
GLUCOSE BLDC GLUCOMTR-MCNC: 152 MG/DL — HIGH (ref 70–99)
GLUCOSE BLDC GLUCOMTR-MCNC: 180 MG/DL — HIGH (ref 70–99)
GLUCOSE BLDC GLUCOMTR-MCNC: 200 MG/DL — HIGH (ref 70–99)
GLUCOSE BLDC GLUCOMTR-MCNC: 22 MG/DL — CRITICAL LOW (ref 70–99)
GLUCOSE BLDC GLUCOMTR-MCNC: 224 MG/DL — HIGH (ref 70–99)
GLUCOSE BLDC GLUCOMTR-MCNC: 244 MG/DL — HIGH (ref 70–99)
GLUCOSE BLDC GLUCOMTR-MCNC: 250 MG/DL — HIGH (ref 70–99)
GLUCOSE BLDC GLUCOMTR-MCNC: 27 MG/DL — CRITICAL LOW (ref 70–99)
GLUCOSE BLDC GLUCOMTR-MCNC: 35 MG/DL — CRITICAL LOW (ref 70–99)
GLUCOSE BLDC GLUCOMTR-MCNC: 419 MG/DL — HIGH (ref 70–99)
GLUCOSE BLDC GLUCOMTR-MCNC: 42 MG/DL — CRITICAL LOW (ref 70–99)
GLUCOSE BLDC GLUCOMTR-MCNC: 61 MG/DL — LOW (ref 70–99)
GLUCOSE BLDC GLUCOMTR-MCNC: 81 MG/DL — SIGNIFICANT CHANGE UP (ref 70–99)
GLUCOSE BLDC GLUCOMTR-MCNC: 89 MG/DL — SIGNIFICANT CHANGE UP (ref 70–99)
GLUCOSE BLDC GLUCOMTR-MCNC: 97 MG/DL — SIGNIFICANT CHANGE UP (ref 70–99)
GLUCOSE BLDC GLUCOMTR-MCNC: <10 MG/DL — CRITICAL LOW (ref 70–99)
GLUCOSE BLDC GLUCOMTR-MCNC: <10 MG/DL — CRITICAL LOW (ref 70–99)
GLUCOSE SERPL-MCNC: 113 MG/DL — HIGH (ref 70–99)
GLUCOSE SERPL-MCNC: 113 MG/DL — HIGH (ref 70–99)
GLUCOSE SERPL-MCNC: 115 MG/DL — HIGH (ref 70–99)
GLUCOSE SERPL-MCNC: 121 MG/DL — HIGH (ref 70–99)
GLUCOSE SERPL-MCNC: 148 MG/DL — HIGH (ref 70–99)
GLUCOSE SERPL-MCNC: 151 MG/DL — HIGH (ref 70–99)
GLUCOSE SERPL-MCNC: 175 MG/DL — HIGH (ref 70–99)
GLUCOSE SERPL-MCNC: 192 MG/DL — HIGH (ref 70–99)
GLUCOSE SERPL-MCNC: 263 MG/DL — HIGH (ref 70–99)
GLUCOSE SERPL-MCNC: 328 MG/DL — HIGH (ref 70–99)
GLUCOSE SERPL-MCNC: 83 MG/DL — SIGNIFICANT CHANGE UP (ref 70–99)
GLUCOSE UR QL: NEGATIVE — SIGNIFICANT CHANGE UP
GLUCOSE UR QL: NEGATIVE — SIGNIFICANT CHANGE UP
GRAN CASTS # UR COMP ASSIST: ABNORMAL /LPF
HAPTOGLOB SERPL-MCNC: 352 MG/DL — HIGH (ref 34–200)
HCO3 BLDA-SCNC: 18 MMOL/L — LOW (ref 21–28)
HCO3 BLDA-SCNC: 22 MMOL/L — SIGNIFICANT CHANGE UP (ref 21–28)
HCO3 BLDA-SCNC: 7 MMOL/L — CRITICAL LOW (ref 21–28)
HCT VFR BLD CALC: 22.3 % — LOW (ref 39–50)
HCT VFR BLD CALC: 22.8 % — LOW (ref 39–50)
HCT VFR BLD CALC: 23.7 % — LOW (ref 39–50)
HCT VFR BLD CALC: 25.6 % — LOW (ref 39–50)
HCT VFR BLD CALC: 25.8 % — LOW (ref 39–50)
HCT VFR BLD CALC: 26.1 % — LOW (ref 39–50)
HCT VFR BLD CALC: 26.8 % — LOW (ref 39–50)
HCT VFR BLD CALC: 27.4 % — LOW (ref 39–50)
HCT VFR BLD CALC: 27.5 % — LOW (ref 39–50)
HCT VFR BLD CALC: 28 % — LOW (ref 39–50)
HCT VFR BLD CALC: 28.1 % — LOW (ref 39–50)
HCT VFR BLD CALC: 28.2 % — LOW (ref 39–50)
HCT VFR BLD CALC: 28.7 % — LOW (ref 39–50)
HCT VFR BLD CALC: 31.2 % — LOW (ref 39–50)
HDLC SERPL-MCNC: 32 MG/DL — LOW
HGB BLD-MCNC: 6.3 G/DL — CRITICAL LOW (ref 13–17)
HGB BLD-MCNC: 6.3 G/DL — CRITICAL LOW (ref 13–17)
HGB BLD-MCNC: 7.5 G/DL — LOW (ref 13–17)
HGB BLD-MCNC: 8 G/DL — LOW (ref 13–17)
HGB BLD-MCNC: 8.1 G/DL — LOW (ref 13–17)
HGB BLD-MCNC: 8.4 G/DL — LOW (ref 13–17)
HGB BLD-MCNC: 8.7 G/DL — LOW (ref 13–17)
HGB BLD-MCNC: 8.7 G/DL — LOW (ref 13–17)
HGB BLD-MCNC: 8.8 G/DL — LOW (ref 13–17)
HGB BLD-MCNC: 8.9 G/DL — LOW (ref 13–17)
HGB BLD-MCNC: 9.1 G/DL — LOW (ref 13–17)
HGB BLD-MCNC: 9.3 G/DL — LOW (ref 13–17)
HYPOCHROMIA BLD QL: SIGNIFICANT CHANGE UP
HYPOCHROMIA BLD QL: SLIGHT — SIGNIFICANT CHANGE UP
IMM GRANULOCYTES NFR BLD AUTO: 11.2 % — HIGH (ref 0–1.5)
IMM GRANULOCYTES NFR BLD AUTO: 12.5 % — HIGH (ref 0–1.5)
IMM GRANULOCYTES NFR BLD AUTO: 15.3 % — HIGH (ref 0–1.5)
IMM GRANULOCYTES NFR BLD AUTO: 16.2 % — HIGH (ref 0–1.5)
IMM GRANULOCYTES NFR BLD AUTO: 20.8 % — HIGH (ref 0–1.5)
IMM GRANULOCYTES NFR BLD AUTO: 25 % — HIGH (ref 0–1.5)
INR BLD: 1.35 — HIGH (ref 0.88–1.16)
INR BLD: 1.37 — HIGH (ref 0.88–1.16)
INR BLD: 1.52 — HIGH (ref 0.88–1.16)
INR BLD: 1.77 — HIGH (ref 0.88–1.16)
INR BLD: 4.26 — HIGH (ref 0.88–1.16)
KETONES UR-MCNC: NEGATIVE — SIGNIFICANT CHANGE UP
KETONES UR-MCNC: NEGATIVE — SIGNIFICANT CHANGE UP
LACTATE SERPL-SCNC: 17 MMOL/L — CRITICAL HIGH (ref 0.5–2)
LACTATE SERPL-SCNC: 2.3 MMOL/L — HIGH (ref 0.5–2)
LACTATE SERPL-SCNC: 2.5 MMOL/L — HIGH (ref 0.5–2)
LACTATE SERPL-SCNC: 2.8 MMOL/L — HIGH (ref 0.5–2)
LACTATE SERPL-SCNC: 3.2 MMOL/L — HIGH (ref 0.5–2)
LACTATE SERPL-SCNC: 3.3 MMOL/L — HIGH (ref 0.5–2)
LACTATE SERPL-SCNC: 3.4 MMOL/L — HIGH (ref 0.5–2)
LACTATE SERPL-SCNC: 3.5 MMOL/L — HIGH (ref 0.5–2)
LACTATE SERPL-SCNC: 3.5 MMOL/L — HIGH (ref 0.5–2)
LACTATE SERPL-SCNC: 4.2 MMOL/L — CRITICAL HIGH (ref 0.5–2)
LACTATE SERPL-SCNC: 5 MMOL/L — CRITICAL HIGH (ref 0.4–2)
LDH SERPL L TO P-CCNC: 197 U/L — SIGNIFICANT CHANGE UP (ref 50–242)
LDH SERPL L TO P-CCNC: 821 U/L — HIGH (ref 50–242)
LEUKOCYTE ESTERASE UR-ACNC: NEGATIVE — SIGNIFICANT CHANGE UP
LEUKOCYTE ESTERASE UR-ACNC: NEGATIVE — SIGNIFICANT CHANGE UP
LIDOCAIN IGE QN: 67 U/L — LOW (ref 73–393)
LIPID PNL WITH DIRECT LDL SERPL: 7 MG/DL — SIGNIFICANT CHANGE UP
LYMPHOCYTES # BLD AUTO: 0.23 K/UL — LOW (ref 1–3.3)
LYMPHOCYTES # BLD AUTO: 0.26 K/UL — LOW (ref 1–3.3)
LYMPHOCYTES # BLD AUTO: 0.3 K/UL — LOW (ref 1–3.3)
LYMPHOCYTES # BLD AUTO: 0.35 K/UL — LOW (ref 1–3.3)
LYMPHOCYTES # BLD AUTO: 0.42 K/UL — LOW (ref 1–3.3)
LYMPHOCYTES # BLD AUTO: 0.63 K/UL — LOW (ref 1–3.3)
LYMPHOCYTES # BLD AUTO: 0.72 K/UL — LOW (ref 1–3.3)
LYMPHOCYTES # BLD AUTO: 0.77 K/UL — LOW (ref 1–3.3)
LYMPHOCYTES # BLD AUTO: 0.85 K/UL — LOW (ref 1–3.3)
LYMPHOCYTES # BLD AUTO: 1.46 K/UL — SIGNIFICANT CHANGE UP (ref 1–3.3)
LYMPHOCYTES # BLD AUTO: 1.49 K/UL — SIGNIFICANT CHANGE UP (ref 1–3.3)
LYMPHOCYTES # BLD AUTO: 1.64 K/UL — SIGNIFICANT CHANGE UP (ref 1–3.3)
LYMPHOCYTES # BLD AUTO: 1.7 % — LOW (ref 13–44)
LYMPHOCYTES # BLD AUTO: 1.9 % — LOW (ref 13–44)
LYMPHOCYTES # BLD AUTO: 11 % — LOW (ref 13–44)
LYMPHOCYTES # BLD AUTO: 2.4 % — LOW (ref 13–44)
LYMPHOCYTES # BLD AUTO: 2.6 % — LOW (ref 13–44)
LYMPHOCYTES # BLD AUTO: 20 % — SIGNIFICANT CHANGE UP (ref 13–44)
LYMPHOCYTES # BLD AUTO: 23 % — SIGNIFICANT CHANGE UP (ref 13–44)
LYMPHOCYTES # BLD AUTO: 25 % — SIGNIFICANT CHANGE UP (ref 13–44)
LYMPHOCYTES # BLD AUTO: 4.4 % — LOW (ref 13–44)
LYMPHOCYTES # BLD AUTO: 6.5 % — LOW (ref 13–44)
LYMPHOCYTES # BLD AUTO: 7.5 % — LOW (ref 13–44)
LYMPHOCYTES # BLD AUTO: 9 % — LOW (ref 13–44)
MACROCYTES BLD QL: SLIGHT — SIGNIFICANT CHANGE UP
MAGNESIUM SERPL-MCNC: 1.9 MG/DL — SIGNIFICANT CHANGE UP (ref 1.6–2.6)
MAGNESIUM SERPL-MCNC: 2 MG/DL — SIGNIFICANT CHANGE UP (ref 1.6–2.6)
MAGNESIUM SERPL-MCNC: 2 MG/DL — SIGNIFICANT CHANGE UP (ref 1.6–2.6)
MAGNESIUM SERPL-MCNC: 2.1 MG/DL — SIGNIFICANT CHANGE UP (ref 1.6–2.6)
MAGNESIUM SERPL-MCNC: 2.5 MG/DL — SIGNIFICANT CHANGE UP (ref 1.6–2.6)
MAGNESIUM SERPL-MCNC: 2.6 MG/DL — SIGNIFICANT CHANGE UP (ref 1.6–2.6)
MANUAL SMEAR VERIFICATION: SIGNIFICANT CHANGE UP
MCHC RBC-ENTMCNC: 26.3 PG — LOW (ref 27–34)
MCHC RBC-ENTMCNC: 26.8 PG — LOW (ref 27–34)
MCHC RBC-ENTMCNC: 27.1 PG — SIGNIFICANT CHANGE UP (ref 27–34)
MCHC RBC-ENTMCNC: 27.6 GM/DL — LOW (ref 32–36)
MCHC RBC-ENTMCNC: 28 PG — SIGNIFICANT CHANGE UP (ref 27–34)
MCHC RBC-ENTMCNC: 28.3 GM/DL — LOW (ref 32–36)
MCHC RBC-ENTMCNC: 28.3 PG — SIGNIFICANT CHANGE UP (ref 27–34)
MCHC RBC-ENTMCNC: 28.5 PG — SIGNIFICANT CHANGE UP (ref 27–34)
MCHC RBC-ENTMCNC: 28.6 PG — SIGNIFICANT CHANGE UP (ref 27–34)
MCHC RBC-ENTMCNC: 28.7 PG — SIGNIFICANT CHANGE UP (ref 27–34)
MCHC RBC-ENTMCNC: 28.9 PG — SIGNIFICANT CHANGE UP (ref 27–34)
MCHC RBC-ENTMCNC: 28.9 PG — SIGNIFICANT CHANGE UP (ref 27–34)
MCHC RBC-ENTMCNC: 29 PG — SIGNIFICANT CHANGE UP (ref 27–34)
MCHC RBC-ENTMCNC: 29.2 GM/DL — LOW (ref 32–36)
MCHC RBC-ENTMCNC: 29.5 PG — SIGNIFICANT CHANGE UP (ref 27–34)
MCHC RBC-ENTMCNC: 30.7 GM/DL — LOW (ref 32–36)
MCHC RBC-ENTMCNC: 31.4 GM/DL — LOW (ref 32–36)
MCHC RBC-ENTMCNC: 31.6 GM/DL — LOW (ref 32–36)
MCHC RBC-ENTMCNC: 31.6 GM/DL — LOW (ref 32–36)
MCHC RBC-ENTMCNC: 31.7 GM/DL — LOW (ref 32–36)
MCHC RBC-ENTMCNC: 31.8 GM/DL — LOW (ref 32–36)
MCHC RBC-ENTMCNC: 31.8 GM/DL — LOW (ref 32–36)
MCHC RBC-ENTMCNC: 32.3 GM/DL — SIGNIFICANT CHANGE UP (ref 32–36)
MCHC RBC-ENTMCNC: 32.8 GM/DL — SIGNIFICANT CHANGE UP (ref 32–36)
MCHC RBC-ENTMCNC: 32.8 GM/DL — SIGNIFICANT CHANGE UP (ref 32–36)
MCHC RBC-ENTMCNC: 33.1 GM/DL — SIGNIFICANT CHANGE UP (ref 32–36)
MCV RBC AUTO: 87.5 FL — SIGNIFICANT CHANGE UP (ref 80–100)
MCV RBC AUTO: 87.9 FL — SIGNIFICANT CHANGE UP (ref 80–100)
MCV RBC AUTO: 88.5 FL — SIGNIFICANT CHANGE UP (ref 80–100)
MCV RBC AUTO: 89.3 FL — SIGNIFICANT CHANGE UP (ref 80–100)
MCV RBC AUTO: 89.5 FL — SIGNIFICANT CHANGE UP (ref 80–100)
MCV RBC AUTO: 89.8 FL — SIGNIFICANT CHANGE UP (ref 80–100)
MCV RBC AUTO: 90 FL — SIGNIFICANT CHANGE UP (ref 80–100)
MCV RBC AUTO: 90.2 FL — SIGNIFICANT CHANGE UP (ref 80–100)
MCV RBC AUTO: 90.3 FL — SIGNIFICANT CHANGE UP (ref 80–100)
MCV RBC AUTO: 90.8 FL — SIGNIFICANT CHANGE UP (ref 80–100)
MCV RBC AUTO: 91.2 FL — SIGNIFICANT CHANGE UP (ref 80–100)
MCV RBC AUTO: 94.9 FL — SIGNIFICANT CHANGE UP (ref 80–100)
MCV RBC AUTO: 95 FL — SIGNIFICANT CHANGE UP (ref 80–100)
MCV RBC AUTO: 96 FL — SIGNIFICANT CHANGE UP (ref 80–100)
METAMYELOCYTES # FLD: 1 % — HIGH (ref 0–0)
METAMYELOCYTES # FLD: 1.9 % — HIGH (ref 0–0)
METAMYELOCYTES # FLD: 4 % — HIGH (ref 0–0)
METAMYELOCYTES # FLD: 4.4 % — HIGH (ref 0–0)
METAMYELOCYTES # FLD: 5.3 % — HIGH (ref 0–0)
MICROCYTES BLD QL: SLIGHT — SIGNIFICANT CHANGE UP
MONOCYTES # BLD AUTO: 0.09 K/UL — SIGNIFICANT CHANGE UP (ref 0–0.9)
MONOCYTES # BLD AUTO: 0.14 K/UL — SIGNIFICANT CHANGE UP (ref 0–0.9)
MONOCYTES # BLD AUTO: 0.14 K/UL — SIGNIFICANT CHANGE UP (ref 0–0.9)
MONOCYTES # BLD AUTO: 0.21 K/UL — SIGNIFICANT CHANGE UP (ref 0–0.9)
MONOCYTES # BLD AUTO: 0.22 K/UL — SIGNIFICANT CHANGE UP (ref 0–0.9)
MONOCYTES # BLD AUTO: 0.32 K/UL — SIGNIFICANT CHANGE UP (ref 0–0.9)
MONOCYTES # BLD AUTO: 0.33 K/UL — SIGNIFICANT CHANGE UP (ref 0–0.9)
MONOCYTES # BLD AUTO: 0.39 K/UL — SIGNIFICANT CHANGE UP (ref 0–0.9)
MONOCYTES # BLD AUTO: 0.42 K/UL — SIGNIFICANT CHANGE UP (ref 0–0.9)
MONOCYTES # BLD AUTO: 0.55 K/UL — SIGNIFICANT CHANGE UP (ref 0–0.9)
MONOCYTES # BLD AUTO: 0.58 K/UL — SIGNIFICANT CHANGE UP (ref 0–0.9)
MONOCYTES # BLD AUTO: 1.9 K/UL — HIGH (ref 0–0.9)
MONOCYTES NFR BLD AUTO: 0.9 % — LOW (ref 2–14)
MONOCYTES NFR BLD AUTO: 1.8 % — LOW (ref 2–14)
MONOCYTES NFR BLD AUTO: 10 % — SIGNIFICANT CHANGE UP (ref 2–14)
MONOCYTES NFR BLD AUTO: 2.6 % — SIGNIFICANT CHANGE UP (ref 2–14)
MONOCYTES NFR BLD AUTO: 23.7 % — HIGH (ref 2–14)
MONOCYTES NFR BLD AUTO: 3 % — SIGNIFICANT CHANGE UP (ref 2–14)
MONOCYTES NFR BLD AUTO: 3.3 % — SIGNIFICANT CHANGE UP (ref 2–14)
MONOCYTES NFR BLD AUTO: 4.9 % — SIGNIFICANT CHANGE UP (ref 2–14)
MONOCYTES NFR BLD AUTO: 5.2 % — SIGNIFICANT CHANGE UP (ref 2–14)
MONOCYTES NFR BLD AUTO: 7.1 % — SIGNIFICANT CHANGE UP (ref 2–14)
MYELOCYTES NFR BLD: 0.9 % — HIGH (ref 0–0)
MYELOCYTES NFR BLD: 2 % — HIGH (ref 0–0)
NEUTROPHILS # BLD AUTO: 14.16 K/UL — HIGH (ref 1.8–7.4)
NEUTROPHILS # BLD AUTO: 14.42 K/UL — HIGH (ref 1.8–7.4)
NEUTROPHILS # BLD AUTO: 3.56 K/UL — SIGNIFICANT CHANGE UP (ref 1.8–7.4)
NEUTROPHILS # BLD AUTO: 4.41 K/UL — SIGNIFICANT CHANGE UP (ref 1.8–7.4)
NEUTROPHILS # BLD AUTO: 5.01 K/UL — SIGNIFICANT CHANGE UP (ref 1.8–7.4)
NEUTROPHILS # BLD AUTO: 5.07 K/UL — SIGNIFICANT CHANGE UP (ref 1.8–7.4)
NEUTROPHILS # BLD AUTO: 5.62 K/UL — SIGNIFICANT CHANGE UP (ref 1.8–7.4)
NEUTROPHILS # BLD AUTO: 6.31 K/UL — SIGNIFICANT CHANGE UP (ref 1.8–7.4)
NEUTROPHILS # BLD AUTO: 7.47 K/UL — HIGH (ref 1.8–7.4)
NEUTROPHILS # BLD AUTO: 8.46 K/UL — HIGH (ref 1.8–7.4)
NEUTROPHILS # BLD AUTO: 8.69 K/UL — HIGH (ref 1.8–7.4)
NEUTROPHILS # BLD AUTO: 9.2 K/UL — HIGH (ref 1.8–7.4)
NEUTROPHILS NFR BLD AUTO: 58 % — SIGNIFICANT CHANGE UP (ref 43–77)
NEUTROPHILS NFR BLD AUTO: 59.2 % — SIGNIFICANT CHANGE UP (ref 43–77)
NEUTROPHILS NFR BLD AUTO: 64 % — SIGNIFICANT CHANGE UP (ref 43–77)
NEUTROPHILS NFR BLD AUTO: 65.3 % — SIGNIFICANT CHANGE UP (ref 43–77)
NEUTROPHILS NFR BLD AUTO: 69.3 % — SIGNIFICANT CHANGE UP (ref 43–77)
NEUTROPHILS NFR BLD AUTO: 70.7 % — SIGNIFICANT CHANGE UP (ref 43–77)
NEUTROPHILS NFR BLD AUTO: 73.4 % — SIGNIFICANT CHANGE UP (ref 43–77)
NEUTROPHILS NFR BLD AUTO: 73.6 % — SIGNIFICANT CHANGE UP (ref 43–77)
NEUTROPHILS NFR BLD AUTO: 77.7 % — HIGH (ref 43–77)
NEUTROPHILS NFR BLD AUTO: 82.4 % — HIGH (ref 43–77)
NEUTROPHILS NFR BLD AUTO: 83.2 % — HIGH (ref 43–77)
NEUTROPHILS NFR BLD AUTO: 88.9 % — HIGH (ref 43–77)
NEUTS BAND # BLD: 0.9 % — SIGNIFICANT CHANGE UP (ref 0–8)
NEUTS BAND # BLD: 0.9 % — SIGNIFICANT CHANGE UP (ref 0–8)
NEUTS BAND # BLD: 3 % — SIGNIFICANT CHANGE UP (ref 0–8)
NEUTS BAND # BLD: 6 % — SIGNIFICANT CHANGE UP (ref 0–8)
NEUTS BAND # BLD: 7.1 % — SIGNIFICANT CHANGE UP (ref 0–8)
NEUTS BAND # BLD: 8.8 % — HIGH (ref 0–8)
NITRITE UR-MCNC: NEGATIVE — SIGNIFICANT CHANGE UP
NITRITE UR-MCNC: NEGATIVE — SIGNIFICANT CHANGE UP
NON HDL CHOLESTEROL: 54 MG/DL — SIGNIFICANT CHANGE UP
NRBC # BLD: 0 /100 WBCS — SIGNIFICANT CHANGE UP (ref 0–0)
NRBC # BLD: 0 /100 — SIGNIFICANT CHANGE UP (ref 0–0)
NRBC # BLD: 1 /100 — HIGH (ref 0–0)
NRBC # BLD: 7 /100 — HIGH (ref 0–0)
NRBC # BLD: SIGNIFICANT CHANGE UP /100 WBCS (ref 0–0)
NT-PROBNP SERPL-SCNC: 434 PG/ML — HIGH
NT-PROBNP SERPL-SCNC: 447 PG/ML — HIGH
OVALOCYTES BLD QL SMEAR: SLIGHT — SIGNIFICANT CHANGE UP
PCO2 BLDA: 18 MMHG — LOW (ref 35–48)
PCO2 BLDA: 33 MMHG — LOW (ref 35–48)
PCO2 BLDA: 36 MMHG — SIGNIFICANT CHANGE UP (ref 35–48)
PCO2 BLDV: 42 MMHG — SIGNIFICANT CHANGE UP (ref 41–51)
PCO2 BLDV: 46 MMHG — SIGNIFICANT CHANGE UP (ref 41–51)
PH BLDA: 7.2 — CRITICAL LOW (ref 7.35–7.45)
PH BLDA: 7.35 — SIGNIFICANT CHANGE UP (ref 7.35–7.45)
PH BLDA: 7.39 — SIGNIFICANT CHANGE UP (ref 7.35–7.45)
PH BLDV: 7.41 — SIGNIFICANT CHANGE UP (ref 7.32–7.43)
PH BLDV: 7.43 — SIGNIFICANT CHANGE UP (ref 7.32–7.43)
PH UR: 5.5 — SIGNIFICANT CHANGE UP (ref 5–8)
PH UR: 5.5 — SIGNIFICANT CHANGE UP (ref 5–8)
PHOSPHATE SERPL-MCNC: 12.6 MG/DL — HIGH (ref 2.5–4.5)
PHOSPHATE SERPL-MCNC: 2.9 MG/DL — SIGNIFICANT CHANGE UP (ref 2.5–4.5)
PHOSPHATE SERPL-MCNC: 3.4 MG/DL — SIGNIFICANT CHANGE UP (ref 2.5–4.5)
PHOSPHATE SERPL-MCNC: 3.8 MG/DL — SIGNIFICANT CHANGE UP (ref 2.5–4.5)
PHOSPHATE SERPL-MCNC: 5 MG/DL — HIGH (ref 2.5–4.5)
PLAT MORPH BLD: ABNORMAL
PLAT MORPH BLD: NORMAL — SIGNIFICANT CHANGE UP
PLATELET # BLD AUTO: 136 K/UL — LOW (ref 150–400)
PLATELET # BLD AUTO: 153 K/UL — SIGNIFICANT CHANGE UP (ref 150–400)
PLATELET # BLD AUTO: 166 K/UL — SIGNIFICANT CHANGE UP (ref 150–400)
PLATELET # BLD AUTO: 188 K/UL — SIGNIFICANT CHANGE UP (ref 150–400)
PLATELET # BLD AUTO: 190 K/UL — SIGNIFICANT CHANGE UP (ref 150–400)
PLATELET # BLD AUTO: 192 K/UL — SIGNIFICANT CHANGE UP (ref 150–400)
PLATELET # BLD AUTO: 199 K/UL — SIGNIFICANT CHANGE UP (ref 150–400)
PLATELET # BLD AUTO: 223 K/UL — SIGNIFICANT CHANGE UP (ref 150–400)
PLATELET # BLD AUTO: 236 K/UL — SIGNIFICANT CHANGE UP (ref 150–400)
PLATELET # BLD AUTO: 241 K/UL — SIGNIFICANT CHANGE UP (ref 150–400)
PLATELET # BLD AUTO: 251 K/UL — SIGNIFICANT CHANGE UP (ref 150–400)
PLATELET # BLD AUTO: 254 K/UL — SIGNIFICANT CHANGE UP (ref 150–400)
PLATELET # BLD AUTO: 299 K/UL — SIGNIFICANT CHANGE UP (ref 150–400)
PLATELET # BLD AUTO: 316 K/UL — SIGNIFICANT CHANGE UP (ref 150–400)
PO2 BLDA: 100 MMHG — SIGNIFICANT CHANGE UP (ref 83–108)
PO2 BLDA: 179 MMHG — HIGH (ref 83–108)
PO2 BLDA: 370 MMHG — HIGH (ref 83–108)
PO2 BLDV: 22 MMHG — LOW (ref 35–40)
PO2 BLDV: 36 MMHG — SIGNIFICANT CHANGE UP (ref 35–40)
POIKILOCYTOSIS BLD QL AUTO: SIGNIFICANT CHANGE UP
POIKILOCYTOSIS BLD QL AUTO: SIGNIFICANT CHANGE UP
POIKILOCYTOSIS BLD QL AUTO: SLIGHT — SIGNIFICANT CHANGE UP
POIKILOCYTOSIS BLD QL AUTO: SLIGHT — SIGNIFICANT CHANGE UP
POLYCHROMASIA BLD QL SMEAR: SIGNIFICANT CHANGE UP
POLYCHROMASIA BLD QL SMEAR: SIGNIFICANT CHANGE UP
POLYCHROMASIA BLD QL SMEAR: SLIGHT — SIGNIFICANT CHANGE UP
POTASSIUM SERPL-MCNC: 3.1 MMOL/L — LOW (ref 3.5–5.3)
POTASSIUM SERPL-MCNC: 3.8 MMOL/L — SIGNIFICANT CHANGE UP (ref 3.5–5.3)
POTASSIUM SERPL-MCNC: 3.9 MMOL/L — SIGNIFICANT CHANGE UP (ref 3.5–5.3)
POTASSIUM SERPL-MCNC: 4.5 MMOL/L — SIGNIFICANT CHANGE UP (ref 3.5–5.3)
POTASSIUM SERPL-MCNC: 4.6 MMOL/L — SIGNIFICANT CHANGE UP (ref 3.5–5.3)
POTASSIUM SERPL-MCNC: 4.7 MMOL/L — SIGNIFICANT CHANGE UP (ref 3.5–5.3)
POTASSIUM SERPL-MCNC: 4.9 MMOL/L — SIGNIFICANT CHANGE UP (ref 3.5–5.3)
POTASSIUM SERPL-MCNC: 4.9 MMOL/L — SIGNIFICANT CHANGE UP (ref 3.5–5.3)
POTASSIUM SERPL-MCNC: 5.1 MMOL/L — SIGNIFICANT CHANGE UP (ref 3.5–5.3)
POTASSIUM SERPL-MCNC: 6 MMOL/L — HIGH (ref 3.5–5.3)
POTASSIUM SERPL-MCNC: 9.7 MMOL/L — CRITICAL HIGH (ref 3.5–5.3)
POTASSIUM SERPL-SCNC: 3.1 MMOL/L — LOW (ref 3.5–5.3)
POTASSIUM SERPL-SCNC: 3.8 MMOL/L — SIGNIFICANT CHANGE UP (ref 3.5–5.3)
POTASSIUM SERPL-SCNC: 3.9 MMOL/L — SIGNIFICANT CHANGE UP (ref 3.5–5.3)
POTASSIUM SERPL-SCNC: 4.5 MMOL/L — SIGNIFICANT CHANGE UP (ref 3.5–5.3)
POTASSIUM SERPL-SCNC: 4.6 MMOL/L — SIGNIFICANT CHANGE UP (ref 3.5–5.3)
POTASSIUM SERPL-SCNC: 4.7 MMOL/L — SIGNIFICANT CHANGE UP (ref 3.5–5.3)
POTASSIUM SERPL-SCNC: 4.9 MMOL/L — SIGNIFICANT CHANGE UP (ref 3.5–5.3)
POTASSIUM SERPL-SCNC: 4.9 MMOL/L — SIGNIFICANT CHANGE UP (ref 3.5–5.3)
POTASSIUM SERPL-SCNC: 5.1 MMOL/L — SIGNIFICANT CHANGE UP (ref 3.5–5.3)
POTASSIUM SERPL-SCNC: 6 MMOL/L — HIGH (ref 3.5–5.3)
POTASSIUM SERPL-SCNC: 9.7 MMOL/L — CRITICAL HIGH (ref 3.5–5.3)
PROT SERPL-MCNC: 3.3 G/DL — LOW (ref 6–8.3)
PROT SERPL-MCNC: 4.5 G/DL — LOW (ref 6–8.3)
PROT SERPL-MCNC: 4.8 G/DL — LOW (ref 6–8.3)
PROT SERPL-MCNC: 4.9 G/DL — LOW (ref 6–8.3)
PROT SERPL-MCNC: 4.9 G/DL — LOW (ref 6–8.3)
PROT SERPL-MCNC: 5 G/DL — LOW (ref 6–8.3)
PROT SERPL-MCNC: 5.2 G/DL — LOW (ref 6–8.3)
PROT SERPL-MCNC: 5.6 G/DL — LOW (ref 6.4–8.2)
PROT SERPL-MCNC: 6.6 G/DL — SIGNIFICANT CHANGE UP (ref 6.4–8.2)
PROT UR-MCNC: 30 MG/DL
PROT UR-MCNC: 30 MG/DL
PROTHROM AB SERPL-ACNC: 16 SEC — HIGH (ref 10.6–13.6)
PROTHROM AB SERPL-ACNC: 16.2 SEC — HIGH (ref 10.6–13.6)
PROTHROM AB SERPL-ACNC: 17.9 SEC — HIGH (ref 10.6–13.6)
PROTHROM AB SERPL-ACNC: 20.7 SEC — HIGH (ref 10.6–13.6)
PROTHROM AB SERPL-ACNC: 47.7 SEC — HIGH (ref 10.6–13.6)
RAPID RVP RESULT: SIGNIFICANT CHANGE UP
RBC # BLD: 2.35 M/UL — LOW (ref 4.2–5.8)
RBC # BLD: 2.4 M/UL — LOW (ref 4.2–5.8)
RBC # BLD: 2.61 M/UL — LOW (ref 4.2–5.8)
RBC # BLD: 2.83 M/UL — LOW (ref 4.2–5.8)
RBC # BLD: 2.85 M/UL — LOW (ref 4.2–5.8)
RBC # BLD: 2.95 M/UL — LOW (ref 4.2–5.8)
RBC # BLD: 3.05 M/UL — LOW (ref 4.2–5.8)
RBC # BLD: 3.05 M/UL — LOW (ref 4.2–5.8)
RBC # BLD: 3.07 M/UL — LOW (ref 4.2–5.8)
RBC # BLD: 3.11 M/UL — LOW (ref 4.2–5.8)
RBC # BLD: 3.11 M/UL — LOW (ref 4.2–5.8)
RBC # BLD: 3.15 M/UL — LOW (ref 4.2–5.8)
RBC # BLD: 3.18 M/UL — LOW (ref 4.2–5.8)
RBC # BLD: 3.21 M/UL — LOW (ref 4.2–5.8)
RBC # BLD: 3.25 M/UL — LOW (ref 4.2–5.8)
RBC # FLD: 16.4 % — HIGH (ref 10.3–14.5)
RBC # FLD: 16.6 % — HIGH (ref 10.3–14.5)
RBC # FLD: 16.6 % — HIGH (ref 10.3–14.5)
RBC # FLD: 17.2 % — HIGH (ref 10.3–14.5)
RBC # FLD: 17.4 % — HIGH (ref 10.3–14.5)
RBC # FLD: 17.5 % — HIGH (ref 10.3–14.5)
RBC # FLD: 17.5 % — HIGH (ref 10.3–14.5)
RBC # FLD: 18 % — HIGH (ref 10.3–14.5)
RBC # FLD: 18.3 % — HIGH (ref 10.3–14.5)
RBC # FLD: 18.6 % — HIGH (ref 10.3–14.5)
RBC # FLD: 18.9 % — HIGH (ref 10.3–14.5)
RBC # FLD: 19.5 % — HIGH (ref 10.3–14.5)
RBC # FLD: 19.7 % — HIGH (ref 10.3–14.5)
RBC # FLD: 20.1 % — HIGH (ref 10.3–14.5)
RBC BLD AUTO: ABNORMAL
RBC CASTS # UR COMP ASSIST: < 5 /HPF — SIGNIFICANT CHANGE UP
RBC CASTS # UR COMP ASSIST: < 5 /HPF — SIGNIFICANT CHANGE UP
RETICS #: 136.5 K/UL — HIGH (ref 25–125)
RETICS/RBC NFR: 4.4 % — HIGH (ref 0.5–2.5)
RH IG SCN BLD-IMP: POSITIVE — SIGNIFICANT CHANGE UP
SAO2 % BLDA: 96.8 % — SIGNIFICANT CHANGE UP (ref 94–98)
SAO2 % BLDA: 98.6 % — HIGH (ref 94–98)
SAO2 % BLDA: 99.8 % — HIGH (ref 94–98)
SAO2 % BLDV: 39 % — SIGNIFICANT CHANGE UP
SAO2 % BLDV: 63 % — SIGNIFICANT CHANGE UP
SARS-COV-2 RNA SPEC QL NAA+PROBE: SIGNIFICANT CHANGE UP
SCHISTOCYTES BLD QL AUTO: SIGNIFICANT CHANGE UP
SCHISTOCYTES BLD QL AUTO: SLIGHT — SIGNIFICANT CHANGE UP
SMUDGE CELLS # BLD: PRESENT — SIGNIFICANT CHANGE UP
SMUDGE CELLS # BLD: PRESENT — SIGNIFICANT CHANGE UP
SODIUM SERPL-SCNC: 140 MMOL/L — SIGNIFICANT CHANGE UP (ref 135–145)
SODIUM SERPL-SCNC: 142 MMOL/L — SIGNIFICANT CHANGE UP (ref 135–145)
SODIUM SERPL-SCNC: 143 MMOL/L — SIGNIFICANT CHANGE UP (ref 135–145)
SODIUM SERPL-SCNC: 144 MMOL/L — SIGNIFICANT CHANGE UP (ref 132–145)
SODIUM SERPL-SCNC: 145 MMOL/L — SIGNIFICANT CHANGE UP (ref 135–145)
SODIUM SERPL-SCNC: 146 MMOL/L — HIGH (ref 135–145)
SODIUM SERPL-SCNC: 147 MMOL/L — HIGH (ref 135–145)
SODIUM SERPL-SCNC: 149 MMOL/L — HIGH (ref 132–145)
SODIUM SERPL-SCNC: 154 MMOL/L — HIGH (ref 135–145)
SP GR SPEC: 1.02 — SIGNIFICANT CHANGE UP (ref 1–1.03)
SP GR SPEC: 1.02 — SIGNIFICANT CHANGE UP (ref 1–1.03)
SPHEROCYTES BLD QL SMEAR: SLIGHT — SIGNIFICANT CHANGE UP
SPHEROCYTES BLD QL SMEAR: SLIGHT — SIGNIFICANT CHANGE UP
TRANSFERRIN SERPL-MCNC: 145 MG/DL — LOW (ref 200–360)
TRIGL SERPL-MCNC: 233 MG/DL — HIGH
TROPONIN I SERPL-MCNC: <0.017 NG/ML — LOW (ref 0.02–0.06)
TROPONIN I SERPL-MCNC: <0.017 NG/ML — LOW (ref 0.02–0.06)
TROPONIN T SERPL-MCNC: 0.01 NG/ML — SIGNIFICANT CHANGE UP (ref 0–0.01)
TROPONIN T SERPL-MCNC: 0.01 NG/ML — SIGNIFICANT CHANGE UP (ref 0–0.01)
TROPONIN T SERPL-MCNC: 0.14 NG/ML — CRITICAL HIGH (ref 0–0.01)
TSH SERPL-MCNC: 0.25 UIU/ML — LOW (ref 0.27–4.2)
URATE CRY FLD QL MICRO: ABNORMAL /HPF
UROBILINOGEN FLD QL: 0.2 E.U./DL — SIGNIFICANT CHANGE UP
UROBILINOGEN FLD QL: 0.2 E.U./DL — SIGNIFICANT CHANGE UP
VANCOMYCIN TROUGH SERPL-MCNC: 12 UG/ML — SIGNIFICANT CHANGE UP (ref 10–20)
WBC # BLD: 11.98 K/UL — HIGH (ref 3.8–10.5)
WBC # BLD: 12.5 K/UL — HIGH (ref 3.8–10.5)
WBC # BLD: 13.03 K/UL — HIGH (ref 3.8–10.5)
WBC # BLD: 15.53 K/UL — HIGH (ref 3.8–10.5)
WBC # BLD: 15.97 K/UL — HIGH (ref 3.8–10.5)
WBC # BLD: 5.83 K/UL — SIGNIFICANT CHANGE UP (ref 3.8–10.5)
WBC # BLD: 7.15 K/UL — SIGNIFICANT CHANGE UP (ref 3.8–10.5)
WBC # BLD: 7.45 K/UL — SIGNIFICANT CHANGE UP (ref 3.8–10.5)
WBC # BLD: 7.76 K/UL — SIGNIFICANT CHANGE UP (ref 3.8–10.5)
WBC # BLD: 7.93 K/UL — SIGNIFICANT CHANGE UP (ref 3.8–10.5)
WBC # BLD: 8.01 K/UL — SIGNIFICANT CHANGE UP (ref 3.8–10.5)
WBC # BLD: 8.59 K/UL — SIGNIFICANT CHANGE UP (ref 3.8–10.5)
WBC # BLD: 9.62 K/UL — SIGNIFICANT CHANGE UP (ref 3.8–10.5)
WBC # BLD: 9.68 K/UL — SIGNIFICANT CHANGE UP (ref 3.8–10.5)
WBC # FLD AUTO: 11.98 K/UL — HIGH (ref 3.8–10.5)
WBC # FLD AUTO: 12.5 K/UL — HIGH (ref 3.8–10.5)
WBC # FLD AUTO: 13.03 K/UL — HIGH (ref 3.8–10.5)
WBC # FLD AUTO: 15.53 K/UL — HIGH (ref 3.8–10.5)
WBC # FLD AUTO: 15.97 K/UL — HIGH (ref 3.8–10.5)
WBC # FLD AUTO: 5.83 K/UL — SIGNIFICANT CHANGE UP (ref 3.8–10.5)
WBC # FLD AUTO: 7.15 K/UL — SIGNIFICANT CHANGE UP (ref 3.8–10.5)
WBC # FLD AUTO: 7.45 K/UL — SIGNIFICANT CHANGE UP (ref 3.8–10.5)
WBC # FLD AUTO: 7.76 K/UL — SIGNIFICANT CHANGE UP (ref 3.8–10.5)
WBC # FLD AUTO: 7.93 K/UL — SIGNIFICANT CHANGE UP (ref 3.8–10.5)
WBC # FLD AUTO: 8.01 K/UL — SIGNIFICANT CHANGE UP (ref 3.8–10.5)
WBC # FLD AUTO: 8.59 K/UL — SIGNIFICANT CHANGE UP (ref 3.8–10.5)
WBC # FLD AUTO: 9.62 K/UL — SIGNIFICANT CHANGE UP (ref 3.8–10.5)
WBC # FLD AUTO: 9.68 K/UL — SIGNIFICANT CHANGE UP (ref 3.8–10.5)
WBC UR QL: < 5 /HPF — SIGNIFICANT CHANGE UP
WBC UR QL: ABNORMAL /HPF

## 2021-01-01 PROCEDURE — 99291 CRITICAL CARE FIRST HOUR: CPT

## 2021-01-01 PROCEDURE — 99232 SBSQ HOSP IP/OBS MODERATE 35: CPT

## 2021-01-01 PROCEDURE — 36556 INSERT NON-TUNNEL CV CATH: CPT

## 2021-01-01 PROCEDURE — 99222 1ST HOSP IP/OBS MODERATE 55: CPT | Mod: GC

## 2021-01-01 PROCEDURE — 99223 1ST HOSP IP/OBS HIGH 75: CPT | Mod: GC

## 2021-01-01 PROCEDURE — 71045 X-RAY EXAM CHEST 1 VIEW: CPT | Mod: 26

## 2021-01-01 PROCEDURE — 31500 INSERT EMERGENCY AIRWAY: CPT | Mod: GC

## 2021-01-01 PROCEDURE — 99358 PROLONG SERVICE W/O CONTACT: CPT

## 2021-01-01 PROCEDURE — 71275 CT ANGIOGRAPHY CHEST: CPT | Mod: 26

## 2021-01-01 PROCEDURE — 76705 ECHO EXAM OF ABDOMEN: CPT | Mod: 26

## 2021-01-01 PROCEDURE — 93970 EXTREMITY STUDY: CPT | Mod: 26

## 2021-01-01 PROCEDURE — 74177 CT ABD & PELVIS W/CONTRAST: CPT | Mod: 26

## 2021-01-01 PROCEDURE — 76937 US GUIDE VASCULAR ACCESS: CPT | Mod: 26

## 2021-01-01 PROCEDURE — 99231 SBSQ HOSP IP/OBS SF/LOW 25: CPT

## 2021-01-01 PROCEDURE — 99292 CRITICAL CARE ADDL 30 MIN: CPT

## 2021-01-01 PROCEDURE — 36000 PLACE NEEDLE IN VEIN: CPT

## 2021-01-01 PROCEDURE — 43235 EGD DIAGNOSTIC BRUSH WASH: CPT

## 2021-01-01 PROCEDURE — 99223 1ST HOSP IP/OBS HIGH 75: CPT

## 2021-01-01 PROCEDURE — 93010 ELECTROCARDIOGRAM REPORT: CPT

## 2021-01-01 PROCEDURE — 76775 US EXAM ABDO BACK WALL LIM: CPT | Mod: 26

## 2021-01-01 PROCEDURE — 99285 EMERGENCY DEPT VISIT HI MDM: CPT

## 2021-01-01 PROCEDURE — 99221 1ST HOSP IP/OBS SF/LOW 40: CPT

## 2021-01-01 PROCEDURE — 93306 TTE W/DOPPLER COMPLETE: CPT | Mod: 26

## 2021-01-01 PROCEDURE — 99291 CRITICAL CARE FIRST HOUR: CPT | Mod: 25

## 2021-01-01 PROCEDURE — 99222 1ST HOSP IP/OBS MODERATE 55: CPT | Mod: 25

## 2021-01-01 PROCEDURE — 74018 RADEX ABDOMEN 1 VIEW: CPT | Mod: 26

## 2021-01-01 RX ORDER — DEXTROSE 50 % IN WATER 50 %
25 SYRINGE (ML) INTRAVENOUS ONCE
Refills: 0 | Status: DISCONTINUED | OUTPATIENT
Start: 2021-01-01 | End: 2021-01-01

## 2021-01-01 RX ORDER — MIDAZOLAM HYDROCHLORIDE 1 MG/ML
2 INJECTION, SOLUTION INTRAMUSCULAR; INTRAVENOUS ONCE
Refills: 0 | Status: DISCONTINUED | OUTPATIENT
Start: 2021-01-01 | End: 2021-01-01

## 2021-01-01 RX ORDER — ACETAMINOPHEN 500 MG
650 TABLET ORAL ONCE
Refills: 0 | Status: COMPLETED | OUTPATIENT
Start: 2021-01-01 | End: 2021-01-01

## 2021-01-01 RX ORDER — IPRATROPIUM/ALBUTEROL SULFATE 18-103MCG
3 AEROSOL WITH ADAPTER (GRAM) INHALATION EVERY 6 HOURS
Refills: 0 | Status: DISCONTINUED | OUTPATIENT
Start: 2021-01-01 | End: 2021-01-01

## 2021-01-01 RX ORDER — ATORVASTATIN CALCIUM 80 MG/1
1 TABLET, FILM COATED ORAL
Qty: 0 | Refills: 0 | DISCHARGE

## 2021-01-01 RX ORDER — HEPARIN SODIUM 5000 [USP'U]/ML
2000 INJECTION INTRAVENOUS; SUBCUTANEOUS EVERY 6 HOURS
Refills: 0 | Status: DISCONTINUED | OUTPATIENT
Start: 2021-01-01 | End: 2021-01-01

## 2021-01-01 RX ORDER — PANTOPRAZOLE SODIUM 20 MG/1
80 TABLET, DELAYED RELEASE ORAL ONCE
Refills: 0 | Status: COMPLETED | OUTPATIENT
Start: 2021-01-01 | End: 2021-01-01

## 2021-01-01 RX ORDER — METRONIDAZOLE 500 MG
500 TABLET ORAL ONCE
Refills: 0 | Status: DISCONTINUED | OUTPATIENT
Start: 2021-01-01 | End: 2021-01-01

## 2021-01-01 RX ORDER — PIPERACILLIN AND TAZOBACTAM 4; .5 G/20ML; G/20ML
3.38 INJECTION, POWDER, LYOPHILIZED, FOR SOLUTION INTRAVENOUS EVERY 6 HOURS
Refills: 0 | Status: DISCONTINUED | OUTPATIENT
Start: 2021-01-01 | End: 2021-01-01

## 2021-01-01 RX ORDER — TAMSULOSIN HYDROCHLORIDE 0.4 MG/1
1 CAPSULE ORAL
Qty: 0 | Refills: 0 | DISCHARGE

## 2021-01-01 RX ORDER — ACETAMINOPHEN 500 MG
750 TABLET ORAL ONCE
Refills: 0 | Status: COMPLETED | OUTPATIENT
Start: 2021-01-01 | End: 2021-01-01

## 2021-01-01 RX ORDER — METRONIDAZOLE 500 MG
500 TABLET ORAL EVERY 8 HOURS
Refills: 0 | Status: DISCONTINUED | OUTPATIENT
Start: 2021-01-01 | End: 2021-01-01

## 2021-01-01 RX ORDER — ALBUTEROL 90 UG/1
2 AEROSOL, METERED ORAL EVERY 6 HOURS
Refills: 0 | Status: DISCONTINUED | OUTPATIENT
Start: 2021-01-01 | End: 2021-01-01

## 2021-01-01 RX ORDER — VANCOMYCIN HCL 1 G
1250 VIAL (EA) INTRAVENOUS EVERY 12 HOURS
Refills: 0 | Status: COMPLETED | OUTPATIENT
Start: 2021-01-01 | End: 2021-01-01

## 2021-01-01 RX ORDER — DEXAMETHASONE 0.5 MG/5ML
10 ELIXIR ORAL ONCE
Refills: 0 | Status: COMPLETED | OUTPATIENT
Start: 2021-01-01 | End: 2021-01-01

## 2021-01-01 RX ORDER — MORPHINE SULFATE 50 MG/1
2 CAPSULE, EXTENDED RELEASE ORAL ONCE
Refills: 0 | Status: DISCONTINUED | OUTPATIENT
Start: 2021-01-01 | End: 2021-01-01

## 2021-01-01 RX ORDER — NOREPINEPHRINE BITARTRATE/D5W 8 MG/250ML
0.05 PLASTIC BAG, INJECTION (ML) INTRAVENOUS
Qty: 8 | Refills: 0 | Status: DISCONTINUED | OUTPATIENT
Start: 2021-01-01 | End: 2021-01-01

## 2021-01-01 RX ORDER — SODIUM CHLORIDE 9 MG/ML
1000 INJECTION, SOLUTION INTRAVENOUS
Refills: 0 | Status: DISCONTINUED | OUTPATIENT
Start: 2021-01-01 | End: 2021-01-01

## 2021-01-01 RX ORDER — ATORVASTATIN CALCIUM 80 MG/1
80 TABLET, FILM COATED ORAL AT BEDTIME
Refills: 0 | Status: DISCONTINUED | OUTPATIENT
Start: 2021-01-01 | End: 2021-01-01

## 2021-01-01 RX ORDER — HEPARIN SODIUM 5000 [USP'U]/ML
4000 INJECTION INTRAVENOUS; SUBCUTANEOUS EVERY 6 HOURS
Refills: 0 | Status: DISCONTINUED | OUTPATIENT
Start: 2021-01-01 | End: 2021-01-01

## 2021-01-01 RX ORDER — SODIUM CHLORIDE 9 MG/ML
1000 INJECTION INTRAMUSCULAR; INTRAVENOUS; SUBCUTANEOUS ONCE
Refills: 0 | Status: COMPLETED | OUTPATIENT
Start: 2021-01-01 | End: 2021-01-01

## 2021-01-01 RX ORDER — SODIUM CHLORIDE 9 MG/ML
10 INJECTION INTRAMUSCULAR; INTRAVENOUS; SUBCUTANEOUS
Refills: 0 | Status: DISCONTINUED | OUTPATIENT
Start: 2021-01-01 | End: 2021-01-01

## 2021-01-01 RX ORDER — NOREPINEPHRINE BITARTRATE/D5W 8 MG/250ML
0.05 PLASTIC BAG, INJECTION (ML) INTRAVENOUS
Qty: 32 | Refills: 0 | Status: DISCONTINUED | OUTPATIENT
Start: 2021-01-01 | End: 2021-01-01

## 2021-01-01 RX ORDER — ASPIRIN/CALCIUM CARB/MAGNESIUM 324 MG
325 TABLET ORAL ONCE
Refills: 0 | Status: COMPLETED | OUTPATIENT
Start: 2021-01-01 | End: 2021-01-01

## 2021-01-01 RX ORDER — PANTOPRAZOLE SODIUM 20 MG/1
40 TABLET, DELAYED RELEASE ORAL EVERY 12 HOURS
Refills: 0 | Status: DISCONTINUED | OUTPATIENT
Start: 2021-01-01 | End: 2021-01-01

## 2021-01-01 RX ORDER — SODIUM CHLORIDE 9 MG/ML
1000 INJECTION, SOLUTION INTRAVENOUS ONCE
Refills: 0 | Status: COMPLETED | OUTPATIENT
Start: 2021-01-01 | End: 2021-01-01

## 2021-01-01 RX ORDER — METRONIDAZOLE 500 MG
TABLET ORAL
Refills: 0 | Status: DISCONTINUED | OUTPATIENT
Start: 2021-01-01 | End: 2021-01-01

## 2021-01-01 RX ORDER — BUDESONIDE AND FORMOTEROL FUMARATE DIHYDRATE 160; 4.5 UG/1; UG/1
2 AEROSOL RESPIRATORY (INHALATION)
Qty: 0 | Refills: 0 | DISCHARGE

## 2021-01-01 RX ORDER — PANTOPRAZOLE SODIUM 20 MG/1
8 TABLET, DELAYED RELEASE ORAL
Qty: 80 | Refills: 0 | Status: DISCONTINUED | OUTPATIENT
Start: 2021-01-01 | End: 2021-01-01

## 2021-01-01 RX ORDER — ALBUTEROL 90 UG/1
2 AEROSOL, METERED ORAL
Qty: 0 | Refills: 0 | DISCHARGE

## 2021-01-01 RX ORDER — DEXTROSE 50 % IN WATER 50 %
12.5 SYRINGE (ML) INTRAVENOUS ONCE
Refills: 0 | Status: DISCONTINUED | OUTPATIENT
Start: 2021-01-01 | End: 2021-01-01

## 2021-01-01 RX ORDER — GLUCAGON INJECTION, SOLUTION 0.5 MG/.1ML
1 INJECTION, SOLUTION SUBCUTANEOUS ONCE
Refills: 0 | Status: DISCONTINUED | OUTPATIENT
Start: 2021-01-01 | End: 2021-01-01

## 2021-01-01 RX ORDER — SODIUM BICARBONATE 1 MEQ/ML
100 SYRINGE (ML) INTRAVENOUS ONCE
Refills: 0 | Status: COMPLETED | OUTPATIENT
Start: 2021-01-01 | End: 2021-01-01

## 2021-01-01 RX ORDER — IPRATROPIUM/ALBUTEROL SULFATE 18-103MCG
3 AEROSOL WITH ADAPTER (GRAM) INHALATION ONCE
Refills: 0 | Status: COMPLETED | OUTPATIENT
Start: 2021-01-01 | End: 2021-01-01

## 2021-01-01 RX ORDER — FENTANYL CITRATE 50 UG/ML
0.5 INJECTION INTRAVENOUS
Qty: 2500 | Refills: 0 | Status: DISCONTINUED | OUTPATIENT
Start: 2021-01-01 | End: 2021-01-01

## 2021-01-01 RX ORDER — ACETAMINOPHEN 500 MG
650 TABLET ORAL EVERY 6 HOURS
Refills: 0 | Status: DISCONTINUED | OUTPATIENT
Start: 2021-01-01 | End: 2021-01-01

## 2021-01-01 RX ORDER — FENTANYL CITRATE 50 UG/ML
100 INJECTION INTRAVENOUS ONCE
Refills: 0 | Status: DISCONTINUED | OUTPATIENT
Start: 2021-01-01 | End: 2021-01-01

## 2021-01-01 RX ORDER — HEPARIN SODIUM 5000 [USP'U]/ML
INJECTION INTRAVENOUS; SUBCUTANEOUS
Qty: 25000 | Refills: 0 | Status: DISCONTINUED | OUTPATIENT
Start: 2021-01-01 | End: 2021-01-01

## 2021-01-01 RX ORDER — VANCOMYCIN HCL 1 G
1000 VIAL (EA) INTRAVENOUS EVERY 12 HOURS
Refills: 0 | Status: COMPLETED | OUTPATIENT
Start: 2021-01-01 | End: 2021-01-01

## 2021-01-01 RX ORDER — BUDESONIDE AND FORMOTEROL FUMARATE DIHYDRATE 160; 4.5 UG/1; UG/1
2 AEROSOL RESPIRATORY (INHALATION)
Refills: 0 | Status: DISCONTINUED | OUTPATIENT
Start: 2021-01-01 | End: 2021-01-01

## 2021-01-01 RX ORDER — INSULIN LISPRO 100/ML
VIAL (ML) SUBCUTANEOUS EVERY 6 HOURS
Refills: 0 | Status: DISCONTINUED | OUTPATIENT
Start: 2021-01-01 | End: 2021-01-01

## 2021-01-01 RX ORDER — HEPARIN SODIUM 5000 [USP'U]/ML
1200 INJECTION INTRAVENOUS; SUBCUTANEOUS
Qty: 25000 | Refills: 0 | Status: DISCONTINUED | OUTPATIENT
Start: 2021-01-01 | End: 2021-01-01

## 2021-01-01 RX ORDER — ACETAMINOPHEN 500 MG
1000 TABLET ORAL ONCE
Refills: 0 | Status: DISCONTINUED | OUTPATIENT
Start: 2021-01-01 | End: 2021-01-01

## 2021-01-01 RX ORDER — TAMSULOSIN HYDROCHLORIDE 0.4 MG/1
0.4 CAPSULE ORAL AT BEDTIME
Refills: 0 | Status: DISCONTINUED | OUTPATIENT
Start: 2021-01-01 | End: 2021-01-01

## 2021-01-01 RX ORDER — DEXMEDETOMIDINE HYDROCHLORIDE IN 0.9% SODIUM CHLORIDE 4 UG/ML
0.5 INJECTION INTRAVENOUS
Qty: 400 | Refills: 0 | Status: DISCONTINUED | OUTPATIENT
Start: 2021-01-01 | End: 2021-01-01

## 2021-01-01 RX ORDER — CEFTRIAXONE 500 MG/1
1000 INJECTION, POWDER, FOR SOLUTION INTRAMUSCULAR; INTRAVENOUS EVERY 24 HOURS
Refills: 0 | Status: DISCONTINUED | OUTPATIENT
Start: 2021-01-01 | End: 2021-01-01

## 2021-01-01 RX ORDER — MAGNESIUM SULFATE 500 MG/ML
2 VIAL (ML) INJECTION ONCE
Refills: 0 | Status: COMPLETED | OUTPATIENT
Start: 2021-01-01 | End: 2021-01-01

## 2021-01-01 RX ORDER — IPRATROPIUM/ALBUTEROL SULFATE 18-103MCG
3 AEROSOL WITH ADAPTER (GRAM) INHALATION
Refills: 0 | Status: COMPLETED | OUTPATIENT
Start: 2021-01-01 | End: 2021-01-01

## 2021-01-01 RX ORDER — CHLORHEXIDINE GLUCONATE 213 G/1000ML
15 SOLUTION TOPICAL EVERY 12 HOURS
Refills: 0 | Status: DISCONTINUED | OUTPATIENT
Start: 2021-01-01 | End: 2021-01-01

## 2021-01-01 RX ORDER — POTASSIUM CHLORIDE 20 MEQ
20 PACKET (EA) ORAL
Refills: 0 | Status: COMPLETED | OUTPATIENT
Start: 2021-01-01 | End: 2021-01-01

## 2021-01-01 RX ORDER — DEXTROSE 50 % IN WATER 50 %
25 SYRINGE (ML) INTRAVENOUS ONCE
Refills: 0 | Status: COMPLETED | OUTPATIENT
Start: 2021-01-01 | End: 2021-01-01

## 2021-01-01 RX ORDER — PANTOPRAZOLE SODIUM 20 MG/1
8 TABLET, DELAYED RELEASE ORAL
Qty: 80 | Refills: 0 | Status: COMPLETED | OUTPATIENT
Start: 2021-01-01 | End: 2021-01-01

## 2021-01-01 RX ORDER — DEXTROSE 50 % IN WATER 50 %
100 SYRINGE (ML) INTRAVENOUS ONCE
Refills: 0 | Status: COMPLETED | OUTPATIENT
Start: 2021-01-01 | End: 2021-01-01

## 2021-01-01 RX ORDER — PROPOFOL 10 MG/ML
10 INJECTION, EMULSION INTRAVENOUS
Qty: 1000 | Refills: 0 | Status: DISCONTINUED | OUTPATIENT
Start: 2021-01-01 | End: 2021-01-01

## 2021-01-01 RX ORDER — PHENYLEPHRINE HYDROCHLORIDE 10 MG/ML
0.1 INJECTION INTRAVENOUS
Qty: 160 | Refills: 0 | Status: DISCONTINUED | OUTPATIENT
Start: 2021-01-01 | End: 2021-01-01

## 2021-01-01 RX ORDER — HEPARIN SODIUM 5000 [USP'U]/ML
4000 INJECTION INTRAVENOUS; SUBCUTANEOUS ONCE
Refills: 0 | Status: COMPLETED | OUTPATIENT
Start: 2021-01-01 | End: 2021-01-01

## 2021-01-01 RX ORDER — DEXTROSE 50 % IN WATER 50 %
15 SYRINGE (ML) INTRAVENOUS ONCE
Refills: 0 | Status: DISCONTINUED | OUTPATIENT
Start: 2021-01-01 | End: 2021-01-01

## 2021-01-01 RX ORDER — VASOPRESSIN 20 [USP'U]/ML
0.03 INJECTION INTRAVENOUS
Qty: 50 | Refills: 0 | Status: DISCONTINUED | OUTPATIENT
Start: 2021-01-01 | End: 2021-01-01

## 2021-01-01 RX ORDER — ASPIRIN/CALCIUM CARB/MAGNESIUM 324 MG
81 TABLET ORAL DAILY
Refills: 0 | Status: DISCONTINUED | OUTPATIENT
Start: 2021-01-01 | End: 2021-01-01

## 2021-01-01 RX ORDER — DEXTROSE 50 % IN WATER 50 %
50 SYRINGE (ML) INTRAVENOUS ONCE
Refills: 0 | Status: COMPLETED | OUTPATIENT
Start: 2021-01-01 | End: 2021-01-01

## 2021-01-01 RX ORDER — ASPIRIN/CALCIUM CARB/MAGNESIUM 324 MG
1 TABLET ORAL
Qty: 0 | Refills: 0 | DISCHARGE

## 2021-01-01 RX ORDER — MORPHINE SULFATE 50 MG/1
1 CAPSULE, EXTENDED RELEASE ORAL ONCE
Refills: 0 | Status: DISCONTINUED | OUTPATIENT
Start: 2021-01-01 | End: 2021-01-01

## 2021-01-01 RX ORDER — CHLORHEXIDINE GLUCONATE 213 G/1000ML
1 SOLUTION TOPICAL
Refills: 0 | Status: DISCONTINUED | OUTPATIENT
Start: 2021-01-01 | End: 2021-01-01

## 2021-01-01 RX ORDER — VANCOMYCIN HCL 1 G
750 VIAL (EA) INTRAVENOUS ONCE
Refills: 0 | Status: DISCONTINUED | OUTPATIENT
Start: 2021-01-01 | End: 2021-01-01

## 2021-01-01 RX ORDER — SODIUM CHLORIDE 9 MG/ML
500 INJECTION INTRAMUSCULAR; INTRAVENOUS; SUBCUTANEOUS ONCE
Refills: 0 | Status: COMPLETED | OUTPATIENT
Start: 2021-01-01 | End: 2021-01-01

## 2021-01-01 RX ADMIN — CHLORHEXIDINE GLUCONATE 1 APPLICATION(S): 213 SOLUTION TOPICAL at 05:06

## 2021-01-01 RX ADMIN — Medication 100 MILLILITER(S): at 00:22

## 2021-01-01 RX ADMIN — SODIUM CHLORIDE 1000 MILLILITER(S): 9 INJECTION, SOLUTION INTRAVENOUS at 09:32

## 2021-01-01 RX ADMIN — CHLORHEXIDINE GLUCONATE 15 MILLILITER(S): 213 SOLUTION TOPICAL at 05:09

## 2021-01-01 RX ADMIN — PIPERACILLIN AND TAZOBACTAM 200 GRAM(S): 4; .5 INJECTION, POWDER, LYOPHILIZED, FOR SOLUTION INTRAVENOUS at 17:15

## 2021-01-01 RX ADMIN — PIPERACILLIN AND TAZOBACTAM 200 GRAM(S): 4; .5 INJECTION, POWDER, LYOPHILIZED, FOR SOLUTION INTRAVENOUS at 11:00

## 2021-01-01 RX ADMIN — PIPERACILLIN AND TAZOBACTAM 200 GRAM(S): 4; .5 INJECTION, POWDER, LYOPHILIZED, FOR SOLUTION INTRAVENOUS at 00:40

## 2021-01-01 RX ADMIN — Medication 50 GRAM(S): at 15:33

## 2021-01-01 RX ADMIN — Medication 650 MILLIGRAM(S): at 14:30

## 2021-01-01 RX ADMIN — Medication 3 MILLILITER(S): at 15:32

## 2021-01-01 RX ADMIN — Medication 4: at 13:31

## 2021-01-01 RX ADMIN — Medication 325 MILLIGRAM(S): at 10:09

## 2021-01-01 RX ADMIN — PIPERACILLIN AND TAZOBACTAM 200 GRAM(S): 4; .5 INJECTION, POWDER, LYOPHILIZED, FOR SOLUTION INTRAVENOUS at 05:06

## 2021-01-01 RX ADMIN — PROPOFOL 3.26 MICROGRAM(S)/KG/MIN: 10 INJECTION, EMULSION INTRAVENOUS at 04:49

## 2021-01-01 RX ADMIN — CHLORHEXIDINE GLUCONATE 15 MILLILITER(S): 213 SOLUTION TOPICAL at 05:24

## 2021-01-01 RX ADMIN — CHLORHEXIDINE GLUCONATE 15 MILLILITER(S): 213 SOLUTION TOPICAL at 05:06

## 2021-01-01 RX ADMIN — MORPHINE SULFATE 2 MILLIGRAM(S): 50 CAPSULE, EXTENDED RELEASE ORAL at 06:36

## 2021-01-01 RX ADMIN — Medication 3 MILLILITER(S): at 15:38

## 2021-01-01 RX ADMIN — Medication 3 MILLILITER(S): at 10:34

## 2021-01-01 RX ADMIN — Medication 5.1 MICROGRAM(S)/KG/MIN: at 17:28

## 2021-01-01 RX ADMIN — CEFTRIAXONE 100 MILLIGRAM(S): 500 INJECTION, POWDER, FOR SOLUTION INTRAMUSCULAR; INTRAVENOUS at 13:45

## 2021-01-01 RX ADMIN — Medication 250 MILLIGRAM(S): at 17:16

## 2021-01-01 RX ADMIN — Medication 166.67 MILLIGRAM(S): at 05:24

## 2021-01-01 RX ADMIN — DEXMEDETOMIDINE HYDROCHLORIDE IN 0.9% SODIUM CHLORIDE 6.13 MICROGRAM(S)/KG/HR: 4 INJECTION INTRAVENOUS at 11:05

## 2021-01-01 RX ADMIN — PROPOFOL 3.26 MICROGRAM(S)/KG/MIN: 10 INJECTION, EMULSION INTRAVENOUS at 08:30

## 2021-01-01 RX ADMIN — Medication 2: at 06:25

## 2021-01-01 RX ADMIN — PANTOPRAZOLE SODIUM 10 MG/HR: 20 TABLET, DELAYED RELEASE ORAL at 10:50

## 2021-01-01 RX ADMIN — SODIUM CHLORIDE 2000 MILLILITER(S): 9 INJECTION INTRAMUSCULAR; INTRAVENOUS; SUBCUTANEOUS at 09:56

## 2021-01-01 RX ADMIN — Medication 750 MILLIGRAM(S): at 07:51

## 2021-01-01 RX ADMIN — Medication 300 MILLIGRAM(S): at 16:26

## 2021-01-01 RX ADMIN — Medication 10 MILLIGRAM(S): at 15:32

## 2021-01-01 RX ADMIN — Medication 260 MILLIGRAM(S): at 14:02

## 2021-01-01 RX ADMIN — Medication 100 MILLIEQUIVALENT(S): at 08:30

## 2021-01-01 RX ADMIN — Medication 3 MILLILITER(S): at 10:10

## 2021-01-01 RX ADMIN — CHLORHEXIDINE GLUCONATE 1 APPLICATION(S): 213 SOLUTION TOPICAL at 05:25

## 2021-01-01 RX ADMIN — Medication 300 MILLIGRAM(S): at 16:23

## 2021-01-01 RX ADMIN — Medication 750 MILLIGRAM(S): at 17:20

## 2021-01-01 RX ADMIN — Medication 3 MILLILITER(S): at 09:00

## 2021-01-01 RX ADMIN — Medication 650 MILLIGRAM(S): at 07:07

## 2021-01-01 RX ADMIN — CHLORHEXIDINE GLUCONATE 15 MILLILITER(S): 213 SOLUTION TOPICAL at 18:32

## 2021-01-01 RX ADMIN — VASOPRESSIN 1.8 UNIT(S)/MIN: 20 INJECTION INTRAVENOUS at 18:37

## 2021-01-01 RX ADMIN — MIDAZOLAM HYDROCHLORIDE 2 MILLIGRAM(S): 1 INJECTION, SOLUTION INTRAMUSCULAR; INTRAVENOUS at 08:30

## 2021-01-01 RX ADMIN — HEPARIN SODIUM 1000 UNIT(S)/HR: 5000 INJECTION INTRAVENOUS; SUBCUTANEOUS at 19:12

## 2021-01-01 RX ADMIN — Medication 250 MILLIGRAM(S): at 05:08

## 2021-01-01 RX ADMIN — PIPERACILLIN AND TAZOBACTAM 200 GRAM(S): 4; .5 INJECTION, POWDER, LYOPHILIZED, FOR SOLUTION INTRAVENOUS at 05:24

## 2021-01-01 RX ADMIN — Medication 3 MILLILITER(S): at 18:27

## 2021-01-01 RX ADMIN — PIPERACILLIN AND TAZOBACTAM 200 GRAM(S): 4; .5 INJECTION, POWDER, LYOPHILIZED, FOR SOLUTION INTRAVENOUS at 05:09

## 2021-01-01 RX ADMIN — Medication 3 MILLILITER(S): at 16:00

## 2021-01-01 RX ADMIN — SODIUM CHLORIDE 1000 MILLILITER(S): 9 INJECTION, SOLUTION INTRAVENOUS at 18:40

## 2021-01-01 RX ADMIN — CHLORHEXIDINE GLUCONATE 1 APPLICATION(S): 213 SOLUTION TOPICAL at 06:28

## 2021-01-01 RX ADMIN — FENTANYL CITRATE 2.45 MICROGRAM(S)/KG/HR: 50 INJECTION INTRAVENOUS at 11:16

## 2021-01-01 RX ADMIN — Medication 650 MILLIGRAM(S): at 05:22

## 2021-01-01 RX ADMIN — Medication 3 MILLILITER(S): at 21:37

## 2021-01-01 RX ADMIN — PANTOPRAZOLE SODIUM 10 MG/HR: 20 TABLET, DELAYED RELEASE ORAL at 16:11

## 2021-01-01 RX ADMIN — Medication 25 GRAM(S): at 18:05

## 2021-01-01 RX ADMIN — Medication 5.1 MICROGRAM(S)/KG/MIN: at 22:20

## 2021-01-01 RX ADMIN — Medication 260 MILLIGRAM(S): at 22:41

## 2021-01-01 RX ADMIN — Medication 3 MILLILITER(S): at 05:34

## 2021-01-01 RX ADMIN — PANTOPRAZOLE SODIUM 80 MILLIGRAM(S): 20 TABLET, DELAYED RELEASE ORAL at 08:40

## 2021-01-01 RX ADMIN — PROPOFOL 3.26 MICROGRAM(S)/KG/MIN: 10 INJECTION, EMULSION INTRAVENOUS at 12:00

## 2021-01-01 RX ADMIN — PANTOPRAZOLE SODIUM 10 MG/HR: 20 TABLET, DELAYED RELEASE ORAL at 03:27

## 2021-01-01 RX ADMIN — Medication 50 MILLILITER(S): at 18:00

## 2021-01-01 RX ADMIN — SODIUM CHLORIDE 500 MILLILITER(S): 9 INJECTION, SOLUTION INTRAVENOUS at 18:07

## 2021-01-01 RX ADMIN — PANTOPRAZOLE SODIUM 40 MILLIGRAM(S): 20 TABLET, DELAYED RELEASE ORAL at 19:52

## 2021-01-01 RX ADMIN — Medication 3 MILLILITER(S): at 04:27

## 2021-01-01 RX ADMIN — Medication 3 MILLILITER(S): at 10:09

## 2021-01-01 RX ADMIN — Medication 3 MILLILITER(S): at 21:06

## 2021-01-01 RX ADMIN — MORPHINE SULFATE 1 MILLIGRAM(S): 50 CAPSULE, EXTENDED RELEASE ORAL at 07:12

## 2021-01-01 RX ADMIN — PROPOFOL 3.26 MICROGRAM(S)/KG/MIN: 10 INJECTION, EMULSION INTRAVENOUS at 17:41

## 2021-01-01 RX ADMIN — Medication 3 MILLILITER(S): at 17:19

## 2021-01-01 RX ADMIN — PIPERACILLIN AND TAZOBACTAM 200 GRAM(S): 4; .5 INJECTION, POWDER, LYOPHILIZED, FOR SOLUTION INTRAVENOUS at 12:28

## 2021-01-01 RX ADMIN — Medication 3 MILLILITER(S): at 21:01

## 2021-01-01 RX ADMIN — Medication 650 MILLIGRAM(S): at 06:09

## 2021-01-01 RX ADMIN — Medication 3 MILLILITER(S): at 17:32

## 2021-01-01 RX ADMIN — CHLORHEXIDINE GLUCONATE 15 MILLILITER(S): 213 SOLUTION TOPICAL at 17:04

## 2021-01-01 RX ADMIN — PROPOFOL 3.26 MICROGRAM(S)/KG/MIN: 10 INJECTION, EMULSION INTRAVENOUS at 17:28

## 2021-01-01 RX ADMIN — SODIUM CHLORIDE 90 MILLILITER(S): 9 INJECTION, SOLUTION INTRAVENOUS at 12:36

## 2021-01-01 RX ADMIN — Medication 25 MILLILITER(S): at 11:19

## 2021-01-01 RX ADMIN — Medication 300 MILLIGRAM(S): at 00:00

## 2021-01-01 RX ADMIN — PIPERACILLIN AND TAZOBACTAM 200 GRAM(S): 4; .5 INJECTION, POWDER, LYOPHILIZED, FOR SOLUTION INTRAVENOUS at 17:04

## 2021-01-01 RX ADMIN — Medication 5.1 MICROGRAM(S)/KG/MIN: at 07:56

## 2021-01-01 RX ADMIN — Medication 100 MILLIEQUIVALENT(S): at 19:25

## 2021-01-01 RX ADMIN — PIPERACILLIN AND TAZOBACTAM 200 GRAM(S): 4; .5 INJECTION, POWDER, LYOPHILIZED, FOR SOLUTION INTRAVENOUS at 18:37

## 2021-01-01 RX ADMIN — Medication 3 MILLILITER(S): at 09:35

## 2021-01-01 RX ADMIN — Medication 100 MILLIGRAM(S): at 13:46

## 2021-01-01 RX ADMIN — PIPERACILLIN AND TAZOBACTAM 200 GRAM(S): 4; .5 INJECTION, POWDER, LYOPHILIZED, FOR SOLUTION INTRAVENOUS at 00:22

## 2021-01-01 RX ADMIN — Medication 750 MILLIGRAM(S): at 01:37

## 2021-01-01 RX ADMIN — PIPERACILLIN AND TAZOBACTAM 200 GRAM(S): 4; .5 INJECTION, POWDER, LYOPHILIZED, FOR SOLUTION INTRAVENOUS at 17:48

## 2021-01-01 RX ADMIN — CHLORHEXIDINE GLUCONATE 15 MILLILITER(S): 213 SOLUTION TOPICAL at 06:25

## 2021-01-01 RX ADMIN — Medication 2: at 05:17

## 2021-01-01 RX ADMIN — CHLORHEXIDINE GLUCONATE 15 MILLILITER(S): 213 SOLUTION TOPICAL at 17:27

## 2021-01-01 RX ADMIN — Medication 100 MILLIEQUIVALENT(S): at 23:49

## 2021-01-01 RX ADMIN — Medication 5.1 MICROGRAM(S)/KG/MIN: at 09:00

## 2021-01-01 RX ADMIN — PROPOFOL 3.26 MICROGRAM(S)/KG/MIN: 10 INJECTION, EMULSION INTRAVENOUS at 21:37

## 2021-01-01 RX ADMIN — HEPARIN SODIUM 4000 UNIT(S): 5000 INJECTION INTRAVENOUS; SUBCUTANEOUS at 19:11

## 2021-01-01 RX ADMIN — Medication 750 MILLIGRAM(S): at 17:25

## 2021-01-01 RX ADMIN — CHLORHEXIDINE GLUCONATE 1 APPLICATION(S): 213 SOLUTION TOPICAL at 05:09

## 2021-01-01 RX ADMIN — Medication 250 MILLIGRAM(S): at 19:24

## 2021-01-01 RX ADMIN — PANTOPRAZOLE SODIUM 40 MILLIGRAM(S): 20 TABLET, DELAYED RELEASE ORAL at 18:45

## 2021-01-01 RX ADMIN — Medication 166.67 MILLIGRAM(S): at 17:04

## 2021-01-01 RX ADMIN — CHLORHEXIDINE GLUCONATE 15 MILLILITER(S): 213 SOLUTION TOPICAL at 17:48

## 2021-01-01 RX ADMIN — Medication 125 MILLIGRAM(S): at 09:56

## 2021-01-01 RX ADMIN — PANTOPRAZOLE SODIUM 10 MG/HR: 20 TABLET, DELAYED RELEASE ORAL at 16:44

## 2021-01-01 RX ADMIN — Medication 650 MILLIGRAM(S): at 23:30

## 2021-01-01 RX ADMIN — CHLORHEXIDINE GLUCONATE 15 MILLILITER(S): 213 SOLUTION TOPICAL at 17:42

## 2021-01-01 RX ADMIN — PIPERACILLIN AND TAZOBACTAM 200 GRAM(S): 4; .5 INJECTION, POWDER, LYOPHILIZED, FOR SOLUTION INTRAVENOUS at 00:01

## 2021-01-01 RX ADMIN — Medication 3 MILLILITER(S): at 05:13

## 2021-01-01 RX ADMIN — PANTOPRAZOLE SODIUM 40 MILLIGRAM(S): 20 TABLET, DELAYED RELEASE ORAL at 06:05

## 2021-01-01 RX ADMIN — Medication 300 MILLIGRAM(S): at 06:19

## 2021-01-01 RX ADMIN — FENTANYL CITRATE 2.45 MICROGRAM(S)/KG/HR: 50 INJECTION INTRAVENOUS at 08:28

## 2021-01-01 RX ADMIN — PANTOPRAZOLE SODIUM 40 MILLIGRAM(S): 20 TABLET, DELAYED RELEASE ORAL at 19:12

## 2021-01-01 RX ADMIN — Medication 100 MILLIEQUIVALENT(S): at 21:25

## 2021-01-01 RX ADMIN — PANTOPRAZOLE SODIUM 40 MILLIGRAM(S): 20 TABLET, DELAYED RELEASE ORAL at 07:26

## 2021-01-01 RX ADMIN — Medication 166.67 MILLIGRAM(S): at 17:48

## 2021-01-01 RX ADMIN — PIPERACILLIN AND TAZOBACTAM 200 GRAM(S): 4; .5 INJECTION, POWDER, LYOPHILIZED, FOR SOLUTION INTRAVENOUS at 11:15

## 2021-01-01 RX ADMIN — CHLORHEXIDINE GLUCONATE 1 APPLICATION(S): 213 SOLUTION TOPICAL at 17:42

## 2021-01-01 RX ADMIN — VASOPRESSIN 1.8 UNIT(S)/MIN: 20 INJECTION INTRAVENOUS at 09:27

## 2021-05-14 NOTE — ED PROVIDER NOTE - SECONDARY DIAGNOSIS.
COPD (chronic obstructive pulmonary disease) Vital Signs Last 24 Hrs  T(C): 36.7 (14 May 2021 08:44), Max: 36.8 (13 May 2021 17:18)  T(F): 98 (14 May 2021 08:44), Max: 98.3 (13 May 2021 17:18)  HR: 80 (14 May 2021 08:44) (80 - 88)  BP: 147/62 (14 May 2021 08:44) (140/85 - 195/67)  BP(mean): --  RR: 18 (14 May 2021 08:44) (16 - 18)  SpO2: 99% (14 May 2021 08:44) (97% - 100%)

## 2021-08-13 NOTE — ED PROVIDER NOTE - NSFOLLOWUPINSTRUCTIONS_ED_ALL_ED_FT
Chronic Obstructive Pulmonary Disease    Chronic obstructive pulmonary disease (COPD) is a lung condition in which airflow from the lungs is limited. Causes include smoking, secondhand smoke exposure, genetics, or recurrent infections. Take all medicines (inhaled or pills) as directed by your health care provider. Avoid exposure to irritants such as smoke, chemicals, and fumes that aggravate your breathing.    If you are a smoker, the most important thing that you can do is stop smoking. Continuing to smoke will cause further lung damage and breathing trouble. Ask your health care provider for help with quitting smoking.    SEEK IMMEDIATE MEDICAL CARE IF YOU HAVE ANY OF THE FOLLOWING SYMPTOMS: shortness of breath at rest or when talking, bluish discoloration of lips, skin, fever, worsening cough, unexplained chest pain, or lightheadedness/dizziness.  Anemia    Anemia is a condition in which the concentration of red blood cells or hemoglobin in the blood is below normal. Hemoglobin is a substance in red blood cells that carries oxygen to the tissues of the body. Anemia results in not enough oxygen reaching these tissues which can cause symptoms such as weakness, dizziness/lightheadedness, shortness of breath, chest pain, paleness, or nausea. The cause of your anemia may or may not be determined immediately. If your hemoglobin was dangerously low, you may have received a blood transfusion. Usually reactions to transfusions occur immediately but monitor yourself for any fevers, rash, or shortness of breath.    SEEK IMMEDIATE MEDICAL CARE IF YOU HAVE ANY OF THE FOLLOWING SYMPTOMS: extreme weakness/chest pain/shortness of breath, black or bloody stools, vomiting blood, fainting, fever, or any signs of dehydration.

## 2021-08-13 NOTE — ED PROVIDER NOTE - OBJECTIVE STATEMENT
PMHX CAD s/p MI 4v stent, COPD not on home O2, ex tobacco use presents with epigastric pain x 2 days, one episode of vomiting today. SOB and wheezing on waking this morning. denies cough fever, chills, nightsweats. states that he does not follow up with doctors and had run out of his nebulizers. PMHX CAD s/p MI 4v stent, COPD not on home O2, ex tobacco use presents with epigastric pain x 2 days, one episode of vomiting today. SOB and wheezing on waking this morning. denies cough fever, chills, nightsweats. states that he does not follow up with doctors and had run out of his nebulizers. no vaccinated for COVID. history taken from previous ED visit as patient is uncooperative and refusing to answer questions.

## 2021-08-13 NOTE — ED ADULT NURSE NOTE - EXPLANATION OF AMA FORM SIGNATURE
PT agitated yelling at staff in ED. Wanting to leave ed and go to [place].  PA Yong aware. Refused to sign AMA paperwork

## 2021-08-13 NOTE — ED ADULT NURSE REASSESSMENT NOTE - NS ED NURSE REASSESS COMMENT FT1
Pt yelling stating "I want to go to [place]." KATTY Sparks aware and witnessed event. PT walked out of ED ambulatory with steady gait. No s/s of acute distress

## 2021-08-13 NOTE — ED PROVIDER NOTE - NSICDXPASTMEDICALHX_GEN_ALL_CORE_FT
PAST MEDICAL HISTORY:  COPD (chronic obstructive pulmonary disease)     HLD (hyperlipidemia)     HTN (hypertension)     MI (myocardial infarction)

## 2021-08-13 NOTE — ED ADULT TRIAGE NOTE - CHIEF COMPLAINT QUOTE
reports epigastric pain, sob, nausea and vomiting- pt denies any history of intubation- pt is awake and alert with h/o asthma and high cholesterol

## 2021-08-13 NOTE — ED ADULT NURSE REASSESSMENT NOTE - NS ED NURSE REASSESS COMMENT FT1
patient demanding IV be taken out and that he go to [facility]. patient is awake and alert. able to put his shoes on and ambulate with steady gait. witnessed by PA

## 2021-08-13 NOTE — ED ADULT NURSE REASSESSMENT NOTE - NS ED NURSE REASSESS COMMENT FT1
Pt received from Aida ZHANG. Pt resting comfortably in bed. No s/s of acute distress. Will continue to monitor

## 2021-08-13 NOTE — ED PROVIDER NOTE - CLINICAL SUMMARY MEDICAL DECISION MAKING FREE TEXT BOX
PMHX CAD s/p MI, COPD not on home O2 presents with SOB and wheezing since this morning, epigastric pain x 2 days. will check labs, give duonebs, solumedrol, CXR

## 2021-08-13 NOTE — ED PROVIDER NOTE - PATIENT PORTAL LINK FT
You can access the FollowMyHealth Patient Portal offered by [facility] by registering at the following website: [email or web address]. By joining [employer]’s FollowMyHealth portal, you will also be able to view your health information using other applications (apps) compatible with our system.

## 2021-08-13 NOTE — ED PROVIDER NOTE - ATTENDING CONTRIBUTION TO CARE
81 year old male with CAD COPD was brought to the ED because of SOB. He has felt worsening SOB for 2 days, in the ED pt demanding to leave, aaox3, does not want to be admitted/transferred/does not want blood transfusion. He is aaox3, understands the risks and says he will come back if worse. Precautions reviewed.

## 2021-08-13 NOTE — ED ADULT NURSE NOTE - NSIMPLEMENTINTERV_GEN_ALL_ED
Implemented All Fall Risk Interventions:  [place] to call system. Call bell, personal items and telephone within reach. Instruct patient to call for assistance. Room bathroom lighting operational. Non-slip footwear when patient is off stretcher. Physically safe environment: no spills, clutter or unnecessary equipment. Stretcher in lowest position, wheels locked, appropriate side rails in place. Provide visual cue, wrist band, yellow gown, etc. Monitor gait and stability. Monitor for mental status changes and reorient to person, place, and time. Review medications for side effects contributing to fall risk. Reinforce activity limits and safety measures with patient and family.

## 2021-08-13 NOTE — ED PROVIDER NOTE - PROGRESS NOTE DETAILS
Patient refusing further testing and treatment.  Pt informed of all lab and radiology results, demonstrates understanding of results.  Pt wants to be discharged despite being advised STRONGLY and REPEATEDLY to stay for admission.  A&Ox3, speaking clearly and coherently, demonstrates decision-making capacity.  Pt understands that the risks of leaving include, but not limited to, death and/or disability; pt accepts these risks.  Pt understands to be seen by PMD in 1-2 days; pt understands to return as soon as possible for persistence or any worsening of symptoms. Pt given opportunity to ask any and all questions.  Pt understands that the ER diagnosis being given today is a preliminary one, and, like many ER diagnoses, is a preliminary impression, often based on limited information, that may change as current Sx's evolve or new Sx's develop.  patient refused to sign AMA forms

## 2021-08-13 NOTE — ED ADULT NURSE NOTE - OBJECTIVE STATEMENT
Pt presents to ED c/p SOB, epigastric and back pain, nausea and vomiting, h/o  CAD s/p MI 4v stent, COPD not on home O2, active smoker. Denies cough fever, chills, dizziness. Not vaccinated for COVID.  Pt uncooperative and refusing to answer questions, and requesting to go [place]

## 2021-08-18 NOTE — ED ADULT NURSE NOTE - OBJECTIVE STATEMENT
Patient presented to the ED with cc of respiratory distress. Patient has SOB, SMALL, increased work of breathing, tachypneic, and wheezing. Patient states he has PMH of asthma and COPD. Patient smoker. Patient COVID19 vaccine status unknown.

## 2021-08-18 NOTE — ED PROVIDER NOTE - OBJECTIVE STATEMENT
80 y/o M with PMHx of asthma and COPD presents to the ED for several days of progressive worsening SOB. Pt reports his symptoms are similar to prior episdoes of asthma / COPD exacerbation. He admits to smoking cigarettes. Pt denies fevers, chills, or CP.

## 2021-08-18 NOTE — ED PROVIDER NOTE - CLINICAL SUMMARY MEDICAL DECISION MAKING FREE TEXT BOX
80 y/o M with Hx of asthma / COPD presenting with progressively worsening SOB. Plan for COPD treatment. Will order CT angio chest given lower extremity swelling. Will likely admit if symptoms do not improve. Will reassess clinically after results have been obtained.

## 2021-08-18 NOTE — ED PROVIDER NOTE - NS_BEDUNITTYPES_ED_ALL_ED
Patient Instructions by Won Tao DO at 02/22/17 09:52 AM     Author:  Won Tao DO Service:  (none) Author Type:  Physician     Filed:  02/22/17 09:52 AM Encounter Date:  2/22/2017 Status:  Signed     :  Won Tao DO (Physician)            -Patient was seen, examined, pertinent labs and images have been reviewed.  -Follow up PRN  -Patient verbalized understanding.       Revision History        User Key Date/Time User Provider Type Action    > [N/A] 02/22/17 09:52 AM Won Tao DO Physician Sign             [facility]

## 2021-08-18 NOTE — ED ADULT NURSE REASSESSMENT NOTE - NS ED NURSE REASSESS COMMENT FT1
Received pt from pervious RN. Pt awake and alert, aox3, denies any acute pain or complaints. Call bell w/in reach. Safety maintained.

## 2021-08-19 NOTE — CONSULT NOTE ADULT - ATTENDING COMMENTS
82 y/o Luxembourger M (hard of hearing) with PMHx CAD (s/p 2 stents), asthma/COPD (current smoker) who presents for several days of worsening shortness of breath. The patient was diagnosed with a segmental PE and admitted to medicine. he was started on a heparin drip. Today around 6 Am he became acutely tachypneic and tachycardia. He was also noted to be hypoxic and was placed on BiPAP. Stat labs were drawn and his ABg showed severe metabolic acidosis with a lactate of 17. His HB also dropped from around 8 to 6.3. PTT was sub therapeutic. The patient was complaining of abdominal pain.  Plan:  - Transfer to MICU  - Intubated the patient once in MICU  - Obtain CTA of the abdomen and chest. to r/o mesenteric ischemia, any other bowel pathology or extension of the PE.  - F/u With PERT  - Hold Heparin GTT for now in the setting of dropping HB  - 2 units of PRBC STAT
Complex medical decision making in the setting of critical illness and undiagnosed metastatic malignancy.    Patient acutely decompensated this AM and has multiple issues that pose a threat to his life (PE, GIB, Lactic Acidosis). These acute problems are in the context of an as of yet undiagnosed metastatic (and therefore incurable) malignancy. Patient was asked about intubation earlier today and was amenable if indicated, unclear if other goals were explored. So far, no surrogate decision maker has been identified. Will need to do due diligence to find someone who is able to assist in decision making. If ultimately no is found and patient continues to deteriorate, can consider 2PC DNR if death appears imminent/inevitable.     Will continue to follow for ongoing GOC discussion.   Emotional support provided, questions answered.    For new or uncontrolled symptoms, please call Palliative Care at [phone]. The service is available 24/7 (including nights & weekends) to provide symptom management recommendations over the phone as appropriate    Patient seen and evaluated with Dr. Hutton, Internal Medicine Resident on Palliative Care rotation, during bedside rounds. Agree with above findings and recommendations, edited documentation as appropriate to reflect the plan of care discussed with patient and myself.

## 2021-08-19 NOTE — H&P ADULT - PROBLEM SELECTOR PLAN 7
Patient with extensive smoking history who presents with acute PE, likely provoked from underlying malignancy. He recalls a 20 lb weight loss over the past 1 month, denies fever or chills.   -CTPE with evidence of 6mm nodule in RLL that is an increase from 4mm nodular density from study in 2017. Now with extensive bony mets c/w metastatic disease to spine, ribs and sternum.  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   -No active issues, continue to monitor Patient was transferred to [facility] with agudelo catheter in place. Prior CT scan in 2007 shows enlarged prostate.   -Outpatient records show recent tamsulosin 0.4mg prescription  -Restarted tamsulosin 0.4mg   -Please attempt TOV

## 2021-08-19 NOTE — H&P ADULT - PROBLEM SELECTOR PLAN 5
F: none   E: replete PRN  N: DASH/TLC diet   DVT: heparin gtt   GI: none     Dispo: RMF  Code status: full code Patient was transferred to [facility] with agudelo catheter in place. Prior CT scan in 2007 shows enlarged prostate.   -Outpatient records show recent tamsulosin 0.4mg prescription  -Restarted tamsulosin 0.4mg   -Please attempt TOV Current every day smoker who presents with shortness of breath - denies any noncompliance with medications (albuterol PRN and symbicort). Unknown follow up, patient could not verbalize.   -CTPE shows evidence of advanced pulmonary emphysema with e/o airway disease, bronchial wall thickening and GGO in upper lobes   -S/p DuoNebs in ED   -Of note, patient was observed to be very short of breath while walking around room, nasal cannula placed back on patient   Plan:  -Continue symbicort inpatient, and albuterol PRN inpatient   -Patient may need home oxygen (did not use any prior to admission) - continue to monitor

## 2021-08-19 NOTE — H&P ADULT - PROBLEM SELECTOR PLAN 8
F: none   E: replete PRN  N: DASH/TLC diet   DVT: heparin gtt   GI: none     Dispo: RMF  Code status: full code

## 2021-08-19 NOTE — H&P ADULT - NSHPSOCIALHISTORY_GEN_ALL_CORE
Patient lives in [place], used to work in the [employer]i/. He is a current every day smoker, smokes 10cigs/day x 70 years. Denies any alcohol or drug use. Lives alone.

## 2021-08-19 NOTE — CONSULT NOTE ADULT - PROBLEM SELECTOR RECOMMENDATION 8
- c/w standing duonebs q6hrs, per primary team  - further management as per primary team Pt intubated and sedated prior to being seen by palliative team. Contact number for brotharielle Moncada listed in EMR, however is no longer active when attempted to call. No other known collateral at this time.  - full code for now  - Will need to obtain further collateral regarding getting in contact HCP/legal surrogate prior to initiating GOC while pt still intubated/sedated.

## 2021-08-19 NOTE — CONSULT NOTE ADULT - ASSESSMENT
80yo man w/ known hx CAD s/p stents, COPD/asthma w/ active chronic smoking hx, presenting w/ several day hx progressively worsening dyspnea, initially admitted for RUL segmental PE w/o R heart strain, incidentally found to have 3cm mass c/f nerve sheath tumor and 6mm lung mass a/w bony mets to spine/ribs, course since complicated by MICU admission and intubation for increased work of breathing in setting of acute abdominal pain, lactic acidosis, and acute Hgb decline in setting of active melena. 82yo man w/ known hx CAD s/p stents, COPD/asthma w/ active chronic smoking hx, presenting w/ several day hx progressively worsening dyspnea, initially admitted for RUL segmental PE w/o R heart strain, incidentally found to have 3cm mass c/f nerve sheath tumor and 6mm lung mass a/w bony mets to spine/ribs, course since complicated by MICU admission and intubation for increased work of breathing in setting of acute abdominal pain, lactic acidosis, and signs of active UGIB. 82yo man w/ known hx CAD s/p stents, COPD/asthma w/ active chronic smoking hx, presenting w/ several day hx progressively worsening dyspnea, initially admitted for RUL segmental PE w/o R heart strain, incidentally found to have 3cm mass c/f nerve sheath tumor and 6mm lung mass a/w bony mets to spine/ribs, course since complicated by MICU admission and intubation for increased work of breathing in setting of acute abdominal pain, lactic acidosis, and signs of active UGIB. Palliative consulted for complex medical decision making in the setting of life-limiting illness.

## 2021-08-19 NOTE — CONSULT NOTE ADULT - PROBLEM SELECTOR RECOMMENDATION 7
Noted on CTA chest for 3cm mass c/f nerve sheath tumor and 6mm RLL lung mass w/ signs bony mets to thoracic spine and multiple ribs. Per ICU consult note overnight, patient made aware of findings prior to intubation.  - will resume GOC discussion, if pt able to be successfully extubated Noted on CTA chest for 3cm mass c/f nerve sheath tumor and 6mm RLL lung mass w/ signs bony mets to thoracic spine and multiple ribs. Per ICU consult note overnight, patient made aware of findings prior to intubation.  - will need to find a surrogate decision maker for further GOC discussion

## 2021-08-19 NOTE — CONSULT NOTE ADULT - ASSESSMENT
80 y/o Bangladeshi M (hard of hearing) with PMHx CAD (s/p 2 stents), asthma/COPD (current smoker) who presents for several days of worsening shortness of breath. Admitted to [facility] for acute hypoxic respiratory failure 2/2 to provoked, acute PE of the RUL without tropinemia or RHS. Suddenly tachypenic with increased work of breathing. Now to be intubated.     #Acute hypoxic respiratory failure 2/2 to PE. Patient origigally stable on 2L NC after being diagnosed with RUL PE. Started on heparin gtt. Patient now with increased work of breathing not responding to BiPAP. ABG 7.2/18/179.   - PERC team recs   - to be intubated   - transfer to MICU   - c/w heparin gtt   - can consider repeat echo to evaluate for RHS   - would follow-up lactate and troponin   - given sudden nature of respiratory decompensation, cannot rule out abdominal source of lactate causing acidosis, will obtain CTA abdomen.     DISPO: MICU      80 y/o Armenian M (hard of hearing) with PMHx CAD (s/p 2 stents), asthma/COPD (current smoker) who presents for several days of worsening shortness of breath. Admitted to [facility] for acute hypoxic respiratory failure 2/2 to provoked, acute PE of the RUL without tropinemia or RHS. Suddenly tachypenic with increased work of breathing. Now to be intubated.     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   -No active issues, continue to monitor.     CARDIOVASCULAR  #CAD (coronary artery disease).  Patient with CAD, hx of 3 stents many years ago.   -Continue home aspirin   -F/u EKG.     RESPIRATORY   #Acute hypoxic respiratory failure 2/2 to PE. Patient originally stable on 2L NC after being diagnosed with RUL PE. Started on heparin gtt. Patient now with increased work of breathing not responding to BiPAP. ABG 7.2/18/179.   - PERC team recs   - to be intubated   - transfer to MICU   - c/w heparin gtt   - can consider repeat echo to evaluate for RHS   - would follow-up lactate and troponin   - given sudden nature of respiratory decompensation, cannot rule out abdominal source of lactate causing acidosis, will obtain CTA abdomen.     #Acute pulmonary embolism.  Patient with acute PE of RUL without evidence of RHS. 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 Hypoxic to 90% on RA, tachy to 112 on admission. No leg swelling at this time. PERC team activated in hospital, no indication for intervention at this time.   -c/w heparin gtt and switch to Eliquis 10mg BID when appropriate   -F/u TTE to r/o RHS, though CTPE negative for RHS   -F/u PERC team recommendations.     #Asthma with COPD.  Current every day smoker who presents with shortness of breath - denies any noncompliance with medications (albuterol PRN and symbicort). Unknown follow up, patient could not verbalize.   -CTPE shows evidence of advanced pulmonary emphysema with e/o airway disease, bronchial wall thickening and GGO in upper lobes   -S/p DuoNebs in ED   -Of note, patient was observed to be very short of breath while walking around room, nasal cannula placed back on patient   -Continue symbicort inpatient, and albuterol PRN inpatient   -Patient may need home oxygen (did not use any prior to admission) - continue to monitor.     #R/O Lung cancer metastatic to bone.  Patient with extensive smoking history who presents with acute PE, likely provoked from underlying malignancy. He recalls a 20 lb weight loss over the past 1 month, denies fever or chills.   -CTPE with evidence of 6mm nodule in RLL that is an increase from 4mm nodular density from study in 2017. Now with extensive bony mets c/w metastatic disease to spine, ribs and sternum.  -Patient informed of likely cancer diagnosis, states that he is not surprised given his extensive smoking history   -Consult heme/onc  -Palliative care consulted, f/u recommendations     RENAL   #Urinary retention  Patient arrived with agudelo catheter in place. Prior CT scan in 2007 shows enlarged prostate.   -Outpatient records show recent tamsulosin 0.4mg prescription  -Restarted tamsulosin 0.4mg     HEMATOLOGY/ONCOLOGY  # Normocytic anemia.  Hemoglobin 8 on admission, unknown baseline. 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 No s/s of active bleeding.    -F/u iron studies, B12, folate, retic, haptoglobin.    METABOLIC  # Metabolic acidosis.   Lactate of 17 after patient decompensated on RMF. ABG 7.2/18/179  Likely due to acute respiratory decompensation. Patient with multiple etiologies including lung CA, PE, COPD. Additional etiology to consider mesenteric ischemia i/s/o Hb drop and profoundly elevated lactate.   - transfer to MICU and intubate  - trend lactate  - fluid resuscitation    PROPHYLACTIC  FLUIDS: none at this time  ELECTROLYTES: Mg<2, K<4  DIET: NPO, to be intubated  GI PPX: Protonix while intubated  VTE PPX: SCD, started on heparin gtt for PE  CODE: FULL  DISPO: MICU

## 2021-08-19 NOTE — H&P ADULT - PROBLEM SELECTOR PLAN 9
Patient with extensive smoking history who presents with acute PE, likely provoked from underlying malignancy. He recalls a 20 lb weight loss over the past 1 month, denies fever or chills.   -CTPE with evidence of 6mm nodule in RLL that is an increase from 4mm nodular density from study in 2017. Now with extensive bony mets c/w metastatic disease to spine, ribs and sternum.  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   -No active issues, continue to monitor

## 2021-08-19 NOTE — H&P ADULT - ATTENDING COMMENTS
reviewed pertinent data , h&p    patient seen and examined during rapid response as pt w/ increased WOB, respiratory distress requiring NRB then BiPAP.  ICU consulted, concern for possible ischemic bowel as pt reporting abdominal pain w/ compensated metabolic acidosis w/ elevated lactate on labs Pt transferred to ICU for further management. Plan per ICU, please see ICU consult note reviewed h&p, pertinent data ,ekg, CXR during rapid     patient seen and examined during rapid response as pt w/ increased work of breathing, in respiratory distress requiring NRB then BiPAP.  ICU consulted, concern for possible ischemic bowel as pt reporting abdominal pain w/ compensated metabolic acidosis w/ highly elevated lactate on labs Pt transferred to ICU for further management. Plan per ICU, please see ICU consult note

## 2021-08-19 NOTE — CONSULT NOTE ADULT - PROBLEM SELECTOR RECOMMENDATION 3
Pt noted for markedly elevated lactate 17 in setting of active abdominal pain. Per primary team c/f acute mesenteric ischemia vs. active UGIB in setting of melena and acute Hgb drop. CXR negative for acute consolidations/infiltrates. No signs of free air under diaphragm.  - f/u CTA abdomen  - f/u GI recs  - continue to trend, management per primary team Noted for acute Hgb drop 8->6.3 in setting of active melena. s/p pRBC transfusion. NGT placed showing dark gastric contents. GI team consulted for c/f active UGIB.  - f/u CTA abdomen and plan for bedside EGD  - transfuse pRBC and further stabilize as per primary team

## 2021-08-19 NOTE — H&P ADULT - ASSESSMENT
82 y/o Rwandan M (hard of hearing) with PMHx CAD (s/p 2 stents), asthma/COPD (current smoker) who presents for several days of worsening shortness of breath. Admitted for further management of acute PE.

## 2021-08-19 NOTE — H&P ADULT - PROBLEM SELECTOR PLAN 4
Patient with CAD, hx of 3 stents many years ago.   -Continue home aspirin   -F/u EKG Hemoglobin 8 on admission, unknown baseline. 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 No s/s of active bleeding.    -F/u iron studies, B12, folate, retic, haptoglobin    ADDENDUM: worsening anemia noted during rapid, pending transfusion (pt consented), heparin gtt held

## 2021-08-19 NOTE — CHART NOTE - NSCHARTNOTEFT_GEN_A_CORE
Provider was paged at approximately 6:20 AM for patient stating "he wanted to die" and being very agitated. I went to examine patient - he was extremely anxious and agitated, stating that he had terrible abdominal and back pain, stated he couldn't breathe. Initial vitals showed BP of 98/84, O2 86% on 2LNC, afebrile, P 120s. Supplemental oxygenation was started with NRB to 10L. Stat labs were drawn, s/f ABG showing 7.2/18/7/8 and lactate of 17. Most likely picture acute metabolic acidosis with compensatory respiratory acidosis from tachypnea. Patient was placed on BiPAP for increased work of breathing. EKG was sinus tachycardia, troponin negative. Hemoglobin decreased from 8 to 6.3, patient was consented for blood transfusion. We also asked the patient if he would be ok with getting intubated and he stated yes. CTA of abdomen pelvis was ordered to rule out mesenteric ischemia as patient was having severe abdominal pain in the setting of an acute PE. During the RR, a total of 3mg morphine was given for severe pain and 500cc NS bolus for hypotension.     Patient was stepped up to MICU for further management, decision was made to intubate patient.

## 2021-08-19 NOTE — CONSULT NOTE ADULT - PROBLEM SELECTOR RECOMMENDATION 5
Noted on CTA chest for 3cm mass c/f nerve sheath tumor and 6mm RLL lung mass w/ signs bony mets to thoracic spine and multiple ribs. Per ICU consult note overnight, patient made aware of findings prior to intubation.  - if patient able to be successfully extubated, can consider resuming steroids for bone pain Pt presenting w/ several days progressively worsening dyspnea. Noted on CTA chest for R segmental PE w/o clear signs of R heart strain.  - f/u TTE when medically stable  - hold AC in setting of active bleed, decision when to resume AC as per primary team

## 2021-08-19 NOTE — H&P ADULT - HISTORY OF PRESENT ILLNESS
80 y/o Icelandic M (hard of hearing) with PMHx CAD (s/p 2 stents), asthma/COPD (current smoker) who presents for several days of worsening shortness of breath. He reports that his symptoms are present to when he has an asthma/COPD exacerbation. He denies any prolonged periods of immobility, recent activity, chest pain, leg pain, non-compliance with his home inhalers. He does admit to seeing his legs swell occasionally, but they are not swollen currently. He does admit to some abdominal pain without nausea or vomiting. He is a current every day cigarette smoker but states he "quit yesterday". He denies any fevers, chills, cough with any sputum etc. Of note, he does admit to a 20lb weight loss over the past month.     ED Course:  Vitals: /69; P 112, R 24, T 98, O2 90% RA   Labs: Hg 8 (unknown baseline), albumin 2.5, pro-  Imaging: CTPE with acute PE in segmental branch of RUL without evidence of RHS. Also e/o 6mm nodule in RLL that is larger than 4mm nodular density in prior study. 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 Extensive sclerotic bony mets throughout spine, sternum and ribs.   Meds: heparin bolus, decadron 10mg IV, tylenol, DuoNebs   EKG: not done

## 2021-08-19 NOTE — H&P ADULT - PROBLEM SELECTOR PLAN 2
Hemoglobin 8 on admission, unknown baseline. 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   -F/u iron studies, B12, folate, retic, haptoglobin Hemoglobin 8 on admission, unknown baseline. 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 No s/s of active bleeding.    -F/u iron studies, B12, folate, retic, haptoglobin ADDENDUM: increased work of breathing this AM requiring BiPAP, metabolic acidosis on ABG w/ elevated lactate level. concern for ischemic bowel. transferred to ICU for further evaluation.

## 2021-08-19 NOTE — CONSULT NOTE ADULT - SUBJECTIVE AND OBJECTIVE BOX
HPI:  80 y/o Italian M (hard of hearing) with PMHx CAD (s/p 2 stents), asthma/COPD (current smoker) who presents for several days of worsening shortness of breath. He reports that his symptoms are present to when he has an asthma/COPD exacerbation. He denies any prolonged periods of immobility, recent activity, chest pain, leg pain, non-compliance with his home inhalers. He does admit to seeing his legs swell occasionally, but they are not swollen currently. He does admit to some abdominal pain without nausea or vomiting. He is a current every day cigarette smoker but states he "quit yesterday". He denies any fevers, chills, cough with any sputum etc. Of note, he does admit to a 20lb weight loss over the past month.     ED Course:  Vitals: /69; P 112, R 24, T 98, O2 90% RA   Labs: Hg 8 (unknown baseline), albumin 2.5, pro-  Imaging: CTPE with acute PE in segmental branch of RUL without evidence of RHS. Also e/o 6mm nodule in RLL that is larger than 4mm nodular density in prior study. 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 Extensive sclerotic bony mets throughout spine, sternum and ribs.   Meds: heparin bolus, decadron 10mg IV, tylenol, DuoNebs   EKG: not done      Patient admitted for management of PE, initiated on a heparin gtt. Overnight, patient complained of severe abdominal pain, found to be in respiratory distress, tachypnic and hypoxic to the 80s. Rapid Response was called, placed on BiPAP initially with no improvement and subsequently intubated. Labs during RR notable for acute drop in H/H from 8 to 6.3 with ezio dark output from sump placed. Patient transferred to MICU for further management. GI consulted for concern for UGIB and need for possible endoscopic evaluation.    Allergies    No Known Allergies    Intolerances      MEDICATIONS:  MEDICATIONS  (STANDING):  albuterol/ipratropium for Nebulization 3 milliLiter(s) Nebulizer every 6 hours  albuterol/ipratropium for Nebulization. 3 milliLiter(s) Nebulizer once  chlorhexidine 2% Cloths 1 Application(s) Topical <User Schedule>  dextrose 40% Gel 15 Gram(s) Oral once  dextrose 5%. 1000 milliLiter(s) (50 mL/Hr) IV Continuous <Continuous>  dextrose 5%. 1000 milliLiter(s) (100 mL/Hr) IV Continuous <Continuous>  dextrose 50% Injectable 25 Gram(s) IV Push once  dextrose 50% Injectable 12.5 Gram(s) IV Push once  dextrose 50% Injectable 25 Gram(s) IV Push once  glucagon  Injectable 1 milliGRAM(s) IntraMuscular once  insulin lispro (ADMELOG) corrective regimen sliding scale   SubCutaneous every 6 hours  norepinephrine Infusion 0.05 MICROgram(s)/kG/Min (5.1 mL/Hr) IV Continuous <Continuous>  pantoprazole  Injectable 40 milliGRAM(s) IV Push every 12 hours  pantoprazole  Injectable 80 milliGRAM(s) IV Push once  phenylephrine    Infusion 0.1 MICROgram(s)/kG/Min (1.02 mL/Hr) IV Continuous <Continuous>  propofol Infusion 10 MICROgram(s)/kG/Min (3.26 mL/Hr) IV Continuous <Continuous>  sodium bicarbonate  Injectable 100 milliEquivalent(s) IV Push once  sodium chloride 0.9% Bolus 500 milliLiter(s) IV Bolus once    MEDICATIONS  (PRN):    PAST MEDICAL & SURGICAL HISTORY:  COPD with asthma    CAD (coronary artery disease)    No significant past surgical history      FAMILY HISTORY:  No pertinent family history in first degree relatives      SOCIAL HISTORY:  [place]: [ ] Current, [ ] Former, [ ] Never; Pack Years:  Alcohol:  Illicit Drugs:    REVIEW OF SYSTEMS:  Constitutional: No fever, chills, weight loss, or fatigue  ENMT:  No visual changes; No difficulty hearing, tinnitus, vertigo; No sinus or throat pain  Neck: No pain or stiffness  Respiratory: No cough, wheezing, or hemoptysis; No shortness of breath  Cardiovascular: No chest pain, palpitations, dizziness, orthopnea, PND, or leg swelling  Gastrointestinal: No abdominal or epigastric pain. No  nausea, vomiting, or hematemesis. No diarrhea, constipation, or steatorrhea. No melena or hematochezia  Genitourinary: No dysuria, urinary frequency/hesitancy, or hematuria  Skin: No itching, burning, rashes or lesions   Musculoskeletal: No joint pain or swelling; No muscle, back or extremity pain    Vital Signs Last 24 Hrs  T(C): 36.7 (19 Aug 2021 01:45), Max: 37.1 (18 Aug 2021 17:25)  T(F): 98.1 (19 Aug 2021 01:45), Max: 98.8 (18 Aug 2021 17:25)  HR: 70 (19 Aug 2021 01:45) (58 - 112)  BP: 127/69 (19 Aug 2021 01:45) (101/63 - 127/69)  BP(mean): --  RR: 20 (19 Aug 2021 01:45) (20 - 24)  SpO2: 93% (19 Aug 2021 01:45) (90% - 96%)    Mode: AC/ CMV (Assist Control/ Continuous Mandatory Ventilation)  RR (machine): 16  TV (machine): 450  FiO2: 100  PEEP: 5  ITime: 1  MAP: 12  PIP: 17    PHYSICAL EXAM:    General: Well developed; well nourished male; lying in bed, intubated and sedated  HEENT: MMM, conjunctiva pale, sclera clear; sump in place with dark ouput  Gastrointestinal: Soft, non-tender non-distended; Normal bowel sounds; No rebound or guarding  Extremities: No clubbing, cyanosis or edema  Neurological: intubated and sedated, unable to assess mental status  Skin: Warm and dry. No obvious rash    LABS:                        6.3    7.45  )-----------( 316      ( 19 Aug 2021 06:59 )             22.3     08-19    140  |  100  |  28<H>  ----------------------------<  328<H>  6.0<H>   |  8<LL>  |  0.75    Ca    8.9      19 Aug 2021 06:59  Mg     2.0     08-18    TPro  5.2<L>  /  Alb  2.8<L>  /  TBili  0.4  /  DBili  x   /  AST  11  /  ALT  10  /  AlkPhos  313<H>  08-19        RADIOLOGY & ADDITIONAL STUDIES:

## 2021-08-19 NOTE — CONSULT NOTE ADULT - PROBLEM SELECTOR RECOMMENDATION 9
known hx CAD w/ remote hx stent placement x 2  - hold antiplatelets in setting of c/f active UGIB  - management as per primary team Pt requiring intubation overnight for increased WOB in setting of acute abdominal and back pain. ABG does not give clear picture whether precipitated in setting of hypercapnia and/or hypoxia. Sedated on propofol gtt.  - sedation weaning/trial extubation per primary team PPSV = 10%, requires total assistance for all ADLs  -c/w supportive care

## 2021-08-19 NOTE — CONSULT NOTE ADULT - PROBLEM SELECTOR RECOMMENDATION 4
Pt presenting w/ several days progressively worsening dyspnea. Noted on CTA chest for R segmental PE w/o clear signs of R heart strain.  - f/u TTE when medically stable  - hold AC in setting of active bleed, decision when to resume AC as per primary team  - management per primary team Pt noted for markedly elevated lactate 17 in setting of active abdominal pain. Per primary team c/f acute mesenteric ischemia vs. active UGIB in setting of melena and acute Hgb drop. CXR negative for acute consolidations/infiltrates. No signs of free air under diaphragm.  - f/u CTA abdomen  - continue to trend, management per primary team

## 2021-08-19 NOTE — CONSULT NOTE ADULT - SUBJECTIVE AND OBJECTIVE BOX
RICHARD LOUIS          [id number]               HPI:  82 y/o Russian M (hard of hearing) with PMHx CAD (s/p 2 stents), asthma/COPD (current smoker) who presents for several days of worsening shortness of breath. He reports that his symptoms are present to when he has an asthma/COPD exacerbation. He denies any prolonged periods of immobility, recent activity, chest pain, leg pain, non-compliance with his home inhalers. He does admit to seeing his legs swell occasionally, but they are not swollen currently. He does admit to some abdominal pain without nausea or vomiting. He is a current every day cigarette smoker but states he "quit yesterday". He denies any fevers, chills, cough with any sputum etc. Of note, he does admit to a 20lb weight loss over the past month.     ED Course:  Vitals: /69; P 112, R 24, T 98, O2 90% RA   Labs: Hg 8 (unknown baseline), albumin 2.5, pro-  Imaging: CTPE with acute PE in segmental branch of RUL without evidence of RHS. Also e/o 6mm nodule in RLL that is larger than 4mm nodular density in prior study. 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 Extensive sclerotic bony mets throughout spine, sternum and ribs.   Meds: heparin bolus, decadron 10mg IV, tylenol, DuoNebs   EKG: not done  (19 Aug 2021 03:18)      PAST MEDICAL & SURGICAL HISTORY:  COPD with asthma    CAD (coronary artery disease)    No significant past surgical history        FAMILY HISTORY:  No pertinent family history in first degree relatives     Reviewed and found non contributory in mother or father    SOCIAL HISTORY:     ROS:    Unable to attain due to:                      Dyspnea (Lela 0-10):                        N/V (Y/N):                              Secretions (Y/N) :                Agitation(Y/N):   Pain (Y/N):       http://geriatrictoolkit.[place].[facility]/cog/painad.pdf  https://www.aci.health.nsw.gov.au/__data/assets/pdf_file/0018/167441/[place]bey_Pain_Scale_Final.pdf  -Provocation/Palliation:  -Quality/Quantity:  -Radiating:  -Severity:  -Timing/Frequency:  -Impact on ADLs:    General:  Denied  HEENT:    Denied  Neck:  Denied  CVS:  Denied  Resp:  Denied  GI:  Denied Last BM:   :  Denied  Musc:  Denied  Neuro:  Denied  Psych:  Denied  Skin:  Denied  Lymph:  Denied    Allergies    No Known Allergies    Intolerances      Opiate Naive (Y/N):   -iStop reviewed (Y/N): Yes. No Rx found on iStop review (Ref#:           )    Medications:      MEDICATIONS  (STANDING):  albuterol/ipratropium for Nebulization 3 milliLiter(s) Nebulizer every 6 hours  albuterol/ipratropium for Nebulization. 3 milliLiter(s) Nebulizer once  chlorhexidine 0.12% Liquid 15 milliLiter(s) Oral Mucosa every 12 hours  chlorhexidine 2% Cloths 1 Application(s) Topical <User Schedule>  dextrose 40% Gel 15 Gram(s) Oral once  dextrose 5%. 1000 milliLiter(s) (50 mL/Hr) IV Continuous <Continuous>  dextrose 5%. 1000 milliLiter(s) (100 mL/Hr) IV Continuous <Continuous>  dextrose 50% Injectable 25 Gram(s) IV Push once  dextrose 50% Injectable 12.5 Gram(s) IV Push once  dextrose 50% Injectable 25 Gram(s) IV Push once  glucagon  Injectable 1 milliGRAM(s) IntraMuscular once  insulin lispro (ADMELOG) corrective regimen sliding scale   SubCutaneous every 6 hours  norepinephrine Infusion 0.05 MICROgram(s)/kG/Min (5.1 mL/Hr) IV Continuous <Continuous>  pantoprazole  Injectable 40 milliGRAM(s) IV Push every 12 hours  phenylephrine    Infusion 0.1 MICROgram(s)/kG/Min (1.02 mL/Hr) IV Continuous <Continuous>  propofol Infusion 10 MICROgram(s)/kG/Min (3.26 mL/Hr) IV Continuous <Continuous>  sodium chloride 0.9% Bolus 500 milliLiter(s) IV Bolus once    MEDICATIONS  (PRN):      Labs:    CBC:                        8.4    7.93  )-----------( 223      ( 19 Aug 2021 12:41 )             25.6     CMP:    08-19    142  |  106  |  35<H>  ----------------------------<  263<H>  4.6   |  23  |  0.99    Ca    7.3<L>      19 Aug 2021 12:41  Mg     2.0     08-18    TPro  4.5<L>  /  Alb  2.4<L>  /  TBili  0.7  /  DBili  x   /  AST  150<H>  /  ALT  153<H>  /  AlkPhos  263<H>  08-19     PT/INR - ( 19 Aug 2021 06:59 )   PT: 17.9 sec;   INR: 1.52          PTT - ( 19 Aug 2021 06:59 )  PTT:43.5 sec       Imaging:  Reviewed    PEx:  T(C): 36.7 (08-19-21 @ 01:45), Max: 37.1 (08-18-21 @ 17:25)  HR: 81 (08-19-21 @ 13:00) (58 - 119)  BP: 71/32 (08-19-21 @ 09:00) (71/32 - 139/107)  RR: 17 (08-19-21 @ 13:00) (16 - 45)  SpO2: 100% (08-19-21 @ 13:00) (90% - 100%)  Wt(kg): --  Daily Height in cm: 165.1 (18 Aug 2021 15:10)    Daily     General:   HEENT:    Neck:   CVS:   Resp:   GI:    :    Musc:     Neuro:   Psych:     Skin:  Lymph:  Preadmit Karnofsky:  %           Current Karnofsky:     %  http://www.npcrc.org/files/news/karnofsky_performance_scale.pdf   http://www.npcrc.org/files/news/palliative_performance_scale_PPSv2.pdf  Cachexia (Y/N):   BMI:    Advanced Directives:     Full Code     DNR/DNI     MOLST     HCP     DPOA     Living Will     Decision maker:  Legal surrogate:    GOALS OF CARE DISCUSSION       Palliative care info/counseling provided	           Family meeting       Advanced Directives addressed please see Advance Care Planning Note	           See previous Palliative Medicine Note       Documentation of GOC: 	    REFERRALS	        Palliative Med        Unit SW/Case Mgmt              Speech/Swallow       Patient/Family Support       Massage Therapy       Music Therapy       Pet Therapy       Hospice       Nutrition       Dietician       PT/OT RICHARD LOUIS          [id number]               HPI:  82 y/o [place] M (hard of hearing) with PMHx CAD (s/p 2 stents), asthma/COPD (current smoker) who presents for several days of worsening shortness of breath. He reports that his symptoms are present to when he has an asthma/COPD exacerbation. He denies any prolonged periods of immobility, recent activity, chest pain, leg pain, non-compliance with his home inhalers. He does admit to seeing his legs swell occasionally, but they are not swollen currently. He does admit to some abdominal pain without nausea or vomiting. He is a current every day cigarette smoker but states he "quit yesterday". He denies any fevers, chills, cough with any sputum etc. Of note, he does admit to a 20lb weight loss over the past month.     ED Course:  Vitals: /69; P 112, R 24, T 98, O2 90% RA   Labs: Hg 8 (unknown baseline), albumin 2.5, pro-  Imaging: CTPE with acute PE in segmental branch of RUL without evidence of RHS. Also e/o 6mm nodule in RLL that is larger than 4mm nodular density in prior study. 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 Extensive sclerotic bony mets throughout spine, sternum and ribs.   Meds: heparin bolus, decadron 10mg IV, tylenol, DuoNebs   EKG: not done  (19 Aug 2021 03:18)      PAST MEDICAL & SURGICAL HISTORY:  COPD with asthma    CAD (coronary artery disease)    No significant past surgical history        FAMILY HISTORY:  No pertinent family history in first degree relatives     Reviewed and found non contributory in mother or father    SOCIAL HISTORY:     ROS:    Unable to attain due to:                      Dyspnea (Lela 0-10):                        N/V (Y/N):                              Secretions (Y/N) :                Agitation(Y/N):   Pain (Y/N):       http://geriatrictoolkit.[place].[facility]/cog/painad.pdf  https://www.aci.health.[place].gov.au/__data/assets/pdf_file/0018/151819/[place]_Pain_Scale_Final.pdf  -Provocation/Palliation:  -Quality/Quantity:  -Radiating:  -Severity:  -Timing/Frequency:  -Impact on ADLs:    General:  Denied  HEENT:    Denied  Neck:  Denied  CVS:  Denied  Resp:  Denied  GI:  Denied Last BM:   :  Denied  Musc:  Denied  Neuro:  Denied  Psych:  Denied  Skin:  Denied  Lymph:  Denied    Allergies    No Known Allergies    Intolerances      Opiate Naive (Y/N):   -iStop reviewed (Y/N): Yes. No Rx found on iStop review (Ref#:           )    Medications:      MEDICATIONS  (STANDING):  albuterol/ipratropium for Nebulization 3 milliLiter(s) Nebulizer every 6 hours  albuterol/ipratropium for Nebulization. 3 milliLiter(s) Nebulizer once  chlorhexidine 0.12% Liquid 15 milliLiter(s) Oral Mucosa every 12 hours  chlorhexidine 2% Cloths 1 Application(s) Topical <User Schedule>  dextrose 40% Gel 15 Gram(s) Oral once  dextrose 5%. 1000 milliLiter(s) (50 mL/Hr) IV Continuous <Continuous>  dextrose 5%. 1000 milliLiter(s) (100 mL/Hr) IV Continuous <Continuous>  dextrose 50% Injectable 25 Gram(s) IV Push once  dextrose 50% Injectable 12.5 Gram(s) IV Push once  dextrose 50% Injectable 25 Gram(s) IV Push once  glucagon  Injectable 1 milliGRAM(s) IntraMuscular once  insulin lispro (ADMELOG) corrective regimen sliding scale   SubCutaneous every 6 hours  norepinephrine Infusion 0.05 MICROgram(s)/kG/Min (5.1 mL/Hr) IV Continuous <Continuous>  pantoprazole  Injectable 40 milliGRAM(s) IV Push every 12 hours  phenylephrine    Infusion 0.1 MICROgram(s)/kG/Min (1.02 mL/Hr) IV Continuous <Continuous>  propofol Infusion 10 MICROgram(s)/kG/Min (3.26 mL/Hr) IV Continuous <Continuous>  sodium chloride 0.9% Bolus 500 milliLiter(s) IV Bolus once    MEDICATIONS  (PRN):      Labs:    CBC:                        8.4    7.93  )-----------( 223      ( 19 Aug 2021 12:41 )             25.6     CMP:    08-19    142  |  106  |  35<H>  ----------------------------<  263<H>  4.6   |  23  |  0.99    Ca    7.3<L>      19 Aug 2021 12:41  Mg     2.0     08-18    TPro  4.5<L>  /  Alb  2.4<L>  /  TBili  0.7  /  DBili  x   /  AST  150<H>  /  ALT  153<H>  /  AlkPhos  263<H>  08-19     PT/INR - ( 19 Aug 2021 06:59 )   PT: 17.9 sec;   INR: 1.52          PTT - ( 19 Aug 2021 06:59 )  PTT:43.5 sec       Imaging:  Reviewed    PEx:  T(C): 36.7 (08-19-21 @ 01:45), Max: 37.1 (08-18-21 @ 17:25)  HR: 81 (08-19-21 @ 13:00) (58 - 119)  BP: 71/32 (08-19-21 @ 09:00) (71/32 - 139/107)  RR: 17 (08-19-21 @ 13:00) (16 - 45)  SpO2: 100% (08-19-21 @ 13:00) (90% - 100%)  Wt(kg): --  Daily Height in cm: 165.1 (18 Aug 2021 15:10)    Daily     General: intubated and sedated on propfol, elderly, frail-appearing  HEENT: +NGT draining dark colored gastric contents  Neck: no JVD  CVS: +s1/s2, no m/r/g  Resp: +intubated, otherwise lungs CTAB w/o crackles or rales  GI: +mildly distended abdomen  Ext: WWP, trace non-pitting pedal edema b/l    Preadmit Karnofsky:  %           Current Karnofsky:     %  http://www.npcrc.org/files/news/karnofsky_performance_scale.pdf   http://www.npcrc.org/files/news/palliative_performance_scale_PPSv2.pdf  Cachexia (Y/N):   BMI:    Advanced Directives:     Full Code     DNR/DNI     MOLST     HCP     DPOA     Living Will     Decision maker:  Legal surrogate:    GOALS OF CARE DISCUSSION       Palliative care info/counseling provided	           Family meeting       Advanced Directives addressed please see Advance Care Planning Note	           See previous Palliative Medicine Note       Documentation of GOC: 	    REFERRALS	        Palliative Med        Unit SW/Case Mgmt              Speech/Swallow       Patient/Family Support       Massage Therapy       Music Therapy       Pet Therapy       Hospice       Nutrition       Dietician       PT/OT RICHARD LOUIS          [id number]               HPI:  82 y/o [place] M (hard of hearing) with PMHx CAD (s/p 2 stents), asthma/COPD (current smoker) who presents for several days of worsening shortness of breath. He reports that his symptoms are present to when he has an asthma/COPD exacerbation. He denies any prolonged periods of immobility, recent activity, chest pain, leg pain, non-compliance with his home inhalers. He does admit to seeing his legs swell occasionally, but they are not swollen currently. He does admit to some abdominal pain without nausea or vomiting. He is a current every day cigarette smoker but states he "quit yesterday". He denies any fevers, chills, cough with any sputum etc. Of note, he does admit to a 20lb weight loss over the past month.   (19 Aug 2021 03:18) Patient seen and examined in ICU. Sedated/intubated so unable to participate in interview or provide history. Unknown social situation, number in the chart for emergency contact is not working. Collateral obtained from chart, primary team, and unit SW. Rapid response from earlier today noted: patient was extremely anxious and agitated, stating that he had terrible abdominal and back pain, stated he couldn't breathe. Initial vitals showed BP of 98/84, O2 86% on 2LNC, afebrile, P 120s. Supplemental oxygenation was started with NRB to 10L. ABG showing 7.2/18/7/8 and lactate of 17. Patient was consented for blood transfusion and asked if he would be ok with getting intubated and he stated yes.     PERTINENT PM/SXH:   COPD with asthma  CAD (coronary artery disease)  No significant past surgical history    FAMILY HISTORY:  No pertinent family history in first degree relatives    ITEMS NOT CHECKED ARE NOT PRESENT    SOCIAL HISTORY:   Significant other/partner:  []  Children:  []  Sabianism/Spirituality:  Substance hx:  []   Tobacco hx:  [x]   Alcohol hx: []   Home Opioid hx:  [] I-Stop Reference No: [id number]  - no active Rx's  Living Situation: [x]Home  []Long term care  []Rehab []Other    ADVANCE DIRECTIVES:    []MOLST  []Living Will  DECISION MAKER(s):  [] Health Care Proxy(s)  [] Surrogate(s)  [] Guardian           Name(s)/Phone Number(s):     BASELINE (I)ADLs (prior to admission):  Dolores: [x]Total  [] Moderate []Dependent    ALLERGIES:  No Known Allergies    MEDICATIONS  (STANDING):  albuterol/ipratropium for Nebulization 3 milliLiter(s) Nebulizer every 6 hours  insulin lispro (ADMELOG) corrective regimen sliding scale   SubCutaneous every 6 hours  norepinephrine Infusion 0.05 MICROgram(s)/kG/Min (5.1 mL/Hr) IV Continuous <Continuous>  pantoprazole  Injectable 40 milliGRAM(s) IV Push every 12 hours  phenylephrine    Infusion 0.1 MICROgram(s)/kG/Min (1.02 mL/Hr) IV Continuous <Continuous>  propofol Infusion 10 MICROgram(s)/kG/Min (3.26 mL/Hr) IV Continuous <Continuous>  sodium chloride 0.9% Bolus 500 milliLiter(s) IV Bolus once    PRESENT SYMPTOMS: [x]Unable to obtain due to poor mentation/encephalopathy  Source if other than patient:  []Family   []Team     Pain: [ ] yes [ ] no  QOL impact -   Location -                    Aggravating Factors -  Quality -  Radiation -  Timing -  Severity (0-10 scale) -   Minimal Acceptable Level (0-10 scale) -    PAIN AD Score: 0  http://geriatrictoolkit.[place].[facility]/cog/painad.pdf (press ctrl +  left click to view)    Dyspnea:                           []Mild  []Moderate []Severe  Anxiety:                             []Mild []Moderate []Severe  Fatigue:                             []Mild []Moderate []Severe  Nausea:                             []Mild []Moderate []Severe  Loss of Appetite:              []Mild []Moderate []Severe  Constipation:                    []Mild []Moderate []Severe    Other Symptoms:  []All other review of systems negative     Palliative Performance Status Version 2:  10%    http://npcrc.org/files/news/palliative_performance_scale_ppsv2.pdf    PHYSICAL EXAM:  GENERAL:  []Alert  []Oriented x   []Lethargic  []Cachexia  [x]Unarousable  []Verbal  [x]Non-Verbal  Behavioral:   []Anxiety  [x]Delirium []Agitation []Cooperative  HEENT:  []Normal   []Dry mouth   [x]ET Tube/Trach  []Oral lesions  PULMONARY:   []Clear []Tachypnea  []Audible excessive secretions   [x]Rhonchi        []Right []Left [x]Bilateral  []Crackles        []Right []Left []Bilateral  []Wheezing     []Right []Left []Bilateral  CARDIOVASCULAR:    [x]Regular []Irregular []Tachy  []Marito []Murmur []Other  GASTROINTESTINAL:  [x]Soft  [x]Distended   [x]+BS  [x]Non tender []Tender  []PEG [x]OGT/ NGT  Last BM: 8/19  GENITOURINARY:  [x]Normal [] Incontinent   []Oliguria/Anuria   []Estrada  MUSCULOSKELETAL:   []Normal   [x]Weakness  []Bed/Wheelchair bound []Edema  NEUROLOGIC:   []No focal deficits  []Cognitive impairment  []Dysphagia []Dysarthria []Paresis [x]Encephalopathic   SKIN:   [x]Normal   []Pressure ulcer(s)  []Rash    CRITICAL CARE:  [x]Shock Present  [ ]Septic [ ]Cardiogenic [ ]Neurologic [x]Hypovolemic  [x]Vasopressors [ ]Inotropes   [x]Respiratory failure present [x]Mechanical Ventilation [ ]Non-invasive ventilatory support [ ]High-Flow  [x]Acute  [ ]Chronic [x]Hypoxic  [ ]Hypercarbic  [ ]Other organ failure    Vital Signs Last 24 Hrs  T(C): 36.7 (08-19-21 @ 01:45), Max: 37.1 (08-18-21 @ 17:25)  HR: 81 (08-19-21 @ 13:00) (58 - 119)  BP: 71/32 (08-19-21 @ 09:00) (71/32 - 139/107)  RR: 17 (08-19-21 @ 13:00) (16 - 45)  SpO2: 100% (08-19-21 @ 13:00) (90% - 100%)    LABS:             CBC:                        8.4    7.93  )-----------( 223      ( 19 Aug 2021 12:41 )             25.6     CMP:    08-19    142  |  106  |  35<H>  ----------------------------<  263<H>  4.6   |  23  |  0.99    Ca    7.3<L>      19 Aug 2021 12:41  Mg     2.0     08-18    TPro  4.5<L>  /  Alb  2.4<L>  /  TBili  0.7  /  DBili  x   /  AST  150<H>  /  ALT  153<H>  /  AlkPhos  263<H>  08-19     PT/INR - ( 19 Aug 2021 06:59 )   PT: 17.9 sec;   INR: 1.52     PTT - ( 19 Aug 2021 06:59 )  PTT:43.5 sec     RADIOLOGY & ADDITIONAL STUDIES:  < from: CT Abdomen and Pelvis w/ IV Cont (08.19.21 @ 12:24) >  1. No evidence of mesenteric ischemia.  2. Pyelonephritis bilaterally. Bladder wall thickening consistent with cystitis. Heterogeneous prostate with areas of low-density may also represent infection, i.e. developing abscesses.  3. Extensive blastic osseous lesions, consistent with metastatic prostate cancer.  4. Advanced atherosclerotic disease, as described above.    PROTEIN CALORIE MALNUTRITION PRESENT: [ ]mild [ ]moderate [ ]severe [ ]underweight [ ]morbid obesity  []PPSV2 < or = to 30% []significant weight loss  []poor nutritional intake []catabolic state []anasarca     Artificial Nutrition []     REFERRALS:  [x]Social Work  []Case management []PT/OT []Chaplaincy  []Hospice  []Patient/Family Support    Care Coordination/Goals of Care Document:     PROGRESS NOTE  Date & Time of Note   2021-08-19 12:15    Notes    Notes: Patient is 82 y/o [place] M (hard of hearing) with PMHx CAD (s/p 2  stents), asthma/COPD (current smoker) who presents for several days of  worsening shortness of breath, CT PE notable for RUL segmental PE, and  suspicion of lung cancer. Hospital course c/b acute hypoxic respiratory failure  requiring intubation this morning with emergent trach placement, and acute drop  in H/H with Code Fusion; uptrend in BUN/Cr c/f UGIB. GI consulted for  consideration of endoscopic evaluation.   No family/ contacts at bedside- due to nature of patient acuity, CM postponed  interview with patient/ family. Will re-atempt at later time. CM remains  available.     Electronically signed by:  Lucille Groves  Electronically signed on:  2021-08-19  12:20        PALLIATIVE MEDICINE COORDINATION OF CARE DOCUMENTATION: [x] Inpatient Consult  Non-Face-to-Face prolonged service provided that relates to (face-to-face) care that has or will occur and ongoing patient management, including one or more of the following: - Reviewed documentation from other physicians and other health care professional services - Reviewed medical records and diagnostic / radiology study results - Coordination with patient's support system  ************************************************************************  MEDICATION REVIEW:  - See Medication List Above    ISTOP REFERENCE: [id number]  - no active Rx's  - PRN usage: NO PRN'S  ------------------------------------------------------------------------  COORDINATION OF CARE:  - Palliative Care consulted for: GOC  - Patient (to be) assessed: 8/19/21  - Patient previously seen by Palliative Care service: NO    ADVANCE CARE PLANNING  - Code status: FULL  - MOLST reviewed in chart: NONE; None found on Alpha  - HCP/ Surrogate: NONE found on Alpha  - [facility] documents: NONE found on Alpha  - HCP/ Living will/Other Advanced Directives in Alpha: NONE found on Alpha  ------------------------------------------------------------------------  CARE PROVIDER DOCUMENTATION:  - SW/CM notes: Remains medically active, number listed for brother is not in service  - GI notes: plan for bedside EGD today  - ICU notes: Acute hypoxic respiratory failure 2/2 to PE. given sudden nature of respiratory decompensation, cannot rule out abdominal source of lactate causing acidosis, will obtain CTA abdomen    PLAN OF CARE  - Known admissions in past year: 0  - Current admit date: 8/18/21  - LOS: 1  - LACE score: 10  - Current dispo plan: TO BE DETERMINED  ------------------------------------------------------------------------  - Time Spent/Chart reviewed: 31 Minutes [including time used to gather, review and transfer data]  - Start: 11:24AM  - End: 11:55AM    Prolonged services rendered, as part of this patient's care provided by Palliative Medicine, include: i. chart review for provider and ancillary service documentation, ii. pertinent diagnostics including laboratory and imaging studies, iii. medication review including PRN use, iv. admission history including previous palliative care encounters and GOC notes, v. advance care planning documents including HCP and MOLST forms in Alpha. Part of Palliative Medicine extended evaluation and management also involves coordination of care with our IDT, the primary and consulting teams, and unit CM/SW and Hospice if eligible. Recommendations based on the information gathered and discussed are outlined in the A/P of Palliative notes.

## 2021-08-19 NOTE — CONSULT NOTE ADULT - PROBLEM SELECTOR RECOMMENDATION 10
Pt intubated and sedated prior to being seen by palliative team. Contact number for brotharielle Moncada listed in EMR, however is no longer active when attempted to call. No other known collateral at this time.  - full code for now  - Will need to obtain further collateral regarding getting in contact HCP/legal surrogate prior to initiating GOC while pt still intubated/sedated.

## 2021-08-19 NOTE — H&P ADULT - PROBLEM SELECTOR PLAN 1
Patient with acute PE of RUL without evidence of RHS. 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 Hypoxic to 90% on RA, tachy to 112 on admission. No leg swelling at this time. PERC team activated in hospital, no indication for intervention at this time.   Plan:  -Continue heparin gtt and switch to Eliquis 10mg BID when appropriate   -F/u TTE to r/o RHS, though CTPE negative for RHS   -F/u PERC team recommendations Patient with acute PE of RUL without evidence of RHS. 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 Hypoxic to 90% on RA, tachy to 112 on admission. No leg swelling at this time. PERC team activated in hospital, no indication for intervention at this time.   Plan:  -Continue heparin gtt and switch to Eliquis 10mg BID when appropriate   -F/u TTE to r/o RHS, though CTPE negative for RHS   -F/u PERC team recommendations    ADDENDUM: rapid in AM see ICU note and below

## 2021-08-19 NOTE — H&P ADULT - NSHPLABSRESULTS_GEN_ALL_CORE
LABS:                        8.0    5.83  )-----------( 254      ( 18 Aug 2021 15:27 )             26.1     08-18    144  |  102  |  17  ----------------------------<  83  4.5   |  26  |  0.70    Ca    8.8      18 Aug 2021 15:27  Mg     2.0     08-18    TPro  6.6  /  Alb  2.5<L>  /  TBili  0.3  /  DBili  x   /  AST  25  /  ALT  17  /  AlkPhos  366<H>  08-18    PT/INR - ( 18 Aug 2021 18:24 )   PT: 16.0 sec;   INR: 1.35          PTT - ( 19 Aug 2021 02:38 )  PTT:38.7 sec    CAPILLARY BLOOD GLUCOSE      POCT Blood Glucose.: 80 mg/dL (18 Aug 2021 15:40)        RADIOLOGY & ADDITIONAL TESTS: Reviewed.    ASSESSMENT:    PLAN:

## 2021-08-19 NOTE — CONSULT NOTE ADULT - PROBLEM SELECTOR RECOMMENDATION 6
Noted on CTA chest for 3cm mass c/f nerve sheath tumor and 6mm RLL lung mass w/ signs bony mets to thoracic spine and multiple ribs. Per ICU consult note overnight, patient made aware of findings prior to intubation.  - will resume GOC discussion, if pt able to be successfully extubated Noted on CTA chest for 3cm mass c/f nerve sheath tumor and 6mm RLL lung mass w/ signs bony mets to thoracic spine and multiple ribs. Per ICU consult note overnight, patient made aware of findings prior to intubation.  - steroids/NSAIDs would be beneficial if there is pain but contraindicated given GIB, if there is bony pain later on can consider Zometa x1

## 2021-08-19 NOTE — CHART NOTE - NSCHARTNOTEFT_GEN_A_CORE
Bedside EGD performed at bedside, with Dr Negrete and myself. Findings as noted below:    Impression:  -Sump in place, otherwise normal esophageal mucosa.    -Ulcer in the antrum.    -No visible active bleeding, notable for residual dark blood within lumen.    -Gastric body filled with dark emesis, suctioned, cleared.      Plan:  -Protonix 40 mg q 12 h x 14 days, followed by 6 week course of Protonix 40 mg daily   -Will need repeat EGD in 8 weeks however pending H/H may re-scope again while inpatient   -Maintain sump on low intermittent suction   -CBC q 6 h, transfuse prn. None

## 2021-08-19 NOTE — CONSULT NOTE ADULT - SUBJECTIVE AND OBJECTIVE BOX
Patient is a 81y old  Male who presents with a chief complaint of pulmonary embolism (19 Aug 2021 03:18)      HPI:  82 y/o Occitan M (hard of hearing) with PMHx CAD (s/p 2 stents), asthma/COPD (current smoker) who presents for several days of worsening shortness of breath. He reports that his symptoms are present to when he has an asthma/COPD exacerbation. He denies any prolonged periods of immobility, recent activity, chest pain, leg pain, non-compliance with his home inhalers. He does admit to seeing his legs swell occasionally, but they are not swollen currently. He does admit to some abdominal pain without nausea or vomiting. He is a current every day cigarette smoker but states he "quit yesterday". He denies any fevers, chills, cough with any sputum etc. Of note, he does admit to a 20lb weight loss over the past month.     ED Course:  Vitals: /69; P 112, R 24, T 98, O2 90% RA   Labs: Hg 8 (unknown baseline), albumin 2.5, pro-  Imaging: CTPE with acute PE in segmental branch of RUL without evidence of RHS. Also e/o 6mm nodule in RLL that is larger than 4mm nodular density in prior study. 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 Extensive sclerotic bony mets throughout spine, sternum and ribs.   Meds: heparin bolus, decadron 10mg IV, tylenol, DuoNebs   EKG: not done  (19 Aug 2021 03:18)      Interval events: Patient was admitted to the regional medical floors. At around 6:40 AM, the patient stated to have abdominal pain. He was on 2L nasal cannula, and was found to be tachypneic, using accessory muscles, and in distress. Patient was escalated to NRB; however, while his hypoxia resolved, his work of breathing did not. The etiology for this increased work of breathing most likely due to acute PE, but given that he also complained of abdominal pain and an ABG with 7.2/18/179 on NRB, there is a concern the patient has a source of lactic acidosis emanating from the abdomen. Patient to be intubated, undergo CTA abdomen, and move to MICU for further management.     Allergies    No Known Allergies    Intolerances      Home Medications:  aspirin 81 mg oral delayed release tablet: 1 tab(s) orally once a day (19 Aug 2021 06:01)  atorvastatin 80 mg oral tablet: 1 tab(s) orally once a day (19 Aug 2021 06:01)  Symbicort 160 mcg-4.5 mcg/inh inhalation aerosol: 2 puff(s) inhaled 2 times a day (19 Aug 2021 06:01)  tamsulosin 0.4 mg oral capsule: 1 cap(s) orally once a day (19 Aug 2021 06:01)  Ventolin HFA 90 mcg/inh inhalation aerosol: 2 puff(s) inhaled every 6 hours, As Needed (19 Aug 2021 06:01)      SOCIAL HX:    Patient lives in [place], used to work in the [employer]i/. He is a current every day smoker, smokes 10cigs/day x 70 years. Denies any alcohol or drug use. Lives alone.    PAST MEDICAL & SURGICAL HISTORY:  COPD with asthma    CAD (coronary artery disease)        FAMILY HISTORY:  :    No known cardiovascular or pulmonary family history     ROS:  See HPI     PHYSICAL EXAM    ICU Vital Signs Last 24 Hrs  T(C): 36.7 (19 Aug 2021 01:45), Max: 37.1 (18 Aug 2021 17:25)  T(F): 98.1 (19 Aug 2021 01:45), Max: 98.8 (18 Aug 2021 17:25)  HR: 70 (19 Aug 2021 01:45) (58 - 112)  BP: 127/69 (19 Aug 2021 01:45) (101/63 - 127/69)  BP(mean): --  ABP: --  ABP(mean): --  RR: 20 (19 Aug 2021 01:45) (20 - 24)  SpO2: 93% (19 Aug 2021 01:45) (90% - 96%)      General: Increased work of breathing, anxious, in distress, on NRB --> BiPAP   HEENT: NC/AT; anicteric sclera; MMM  Neck: supple  Cardiovascular: +S1/S2; Regular rhythm, tachycardic   Respiratory: tachypneic with distant breath sounds   Gastrointestinal: soft, NT/ND; +BSx4  Extremities: WWP; no edema, clubbing or cyanosis  Vascular: 2+ radial, DP/PT pulses B/L  Neurological: AAOx3        LABS:                          8.0    5.83  )-----------( 254      ( 18 Aug 2021 15:27 )             26.1                                               08-18    144  |  102  |  17  ----------------------------<  83  4.5   |  26  |  0.70    Ca    8.8      18 Aug 2021 15:27  Mg     2.0     08-18    TPro  6.6  /  Alb  2.5<L>  /  TBili  0.3  /  DBili  x   /  AST  25  /  ALT  17  /  AlkPhos  366<H>  08-18      PT/INR - ( 18 Aug 2021 18:24 )   PT: 16.0 sec;   INR: 1.35          PTT - ( 19 Aug 2021 02:38 )  PTT:38.7 sec                                           CARDIAC MARKERS ( 18 Aug 2021 15:35 )  x     / x     / 91 U/L / x     / x      CARDIAC MARKERS ( 18 Aug 2021 15:27 )  <0.017 ng/mL / x     / x     / x     / x                                                LIVER FUNCTIONS - ( 18 Aug 2021 15:27 )  Alb: 2.5 g/dL / Pro: 6.6 g/dL / ALK PHOS: 366 U/L / ALT: 17 U/L / AST: 25 U/L / GGT: x                                                                                                                                       ABG - ( 19 Aug 2021 06:54 )  pH, Arterial: 7.20  pH, Blood: x     /  pCO2: 18    /  pO2: 179   / HCO3: 7     / Base Excess: -18.8 /  SaO2: 96.8                MEDICATIONS  (STANDING):  albuterol/ipratropium for Nebulization. 3 milliLiter(s) Nebulizer once  aspirin enteric coated 81 milliGRAM(s) Oral daily  atorvastatin 80 milliGRAM(s) Oral at bedtime  budesonide 160 MICROgram(s)/formoterol 4.5 MICROgram(s) Inhaler 2 Puff(s) Inhalation two times a day  heparin  Infusion 1200 Unit(s)/Hr (12 mL/Hr) IV Continuous <Continuous>  morphine  - Injectable 1 milliGRAM(s) IV Push once  sodium chloride 0.9% Bolus 500 milliLiter(s) IV Bolus once  tamsulosin 0.4 milliGRAM(s) Oral at bedtime    MEDICATIONS  (PRN):  acetaminophen   Tablet .. 650 milliGRAM(s) Oral every 6 hours PRN Temp greater or equal to 38C (100.4F), Mild Pain (1 - 3)  ALBUTerol    90 MICROgram(s) HFA Inhaler 2 Puff(s) Inhalation every 6 hours PRN Shortness of Breath and/or Wheezing      Radiology: Reviewed          Patient is a 81y old  Male who presents with a chief complaint of pulmonary embolism (19 Aug 2021 03:18)      HPI:  82 y/o Amharic M (hard of hearing) with PMHx CAD (s/p 2 stents), asthma/COPD (current smoker) who presents for several days of worsening shortness of breath. He reports that his symptoms are present to when he has an asthma/COPD exacerbation. He denies any prolonged periods of immobility, recent activity, chest pain, leg pain, non-compliance with his home inhalers. He does admit to seeing his legs swell occasionally, but they are not swollen currently. He does admit to some abdominal pain without nausea or vomiting. He is a current every day cigarette smoker but states he "quit yesterday". He denies any fevers, chills, cough with any sputum etc. Of note, he does admit to a 20lb weight loss over the past month.     ED Course:  Vitals: /69; P 112, R 24, T 98, O2 90% RA   Labs: Hg 8 (unknown baseline), albumin 2.5, pro-  Imaging: CTPE with acute PE in segmental branch of RUL without evidence of RHS. Also e/o 6mm nodule in RLL that is larger than 4mm nodular density in prior study. 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 Extensive sclerotic bony mets throughout spine, sternum and ribs.   Meds: heparin bolus, decadron 10mg IV, tylenol, DuoNebs   EKG: not done  (19 Aug 2021 03:18)      Interval events: Patient was admitted to the regional medical floors. At around 6:40 AM, the patient stated to have abdominal pain. He was on 2L nasal cannula, and was found to be tachypneic, using accessory muscles, and in distress. Patient was escalated to NRB; however, while his hypoxia resolved, his work of breathing did not. The etiology for this increased work of breathing most likely due to acute PE, but given that he also complained of abdominal pain and an ABG with 7.2/18/179 on NRB, there is a concern the patient has a source of lactic acidosis emanating from the abdomen. Patient to be intubated, undergo CTA abdomen, and move to MICU for further management.     Allergies    No Known Allergies    Home Medications:  aspirin 81 mg oral delayed release tablet: 1 tab(s) orally once a day (19 Aug 2021 06:01)  atorvastatin 80 mg oral tablet: 1 tab(s) orally once a day (19 Aug 2021 06:01)  Symbicort 160 mcg-4.5 mcg/inh inhalation aerosol: 2 puff(s) inhaled 2 times a day (19 Aug 2021 06:01)  tamsulosin 0.4 mg oral capsule: 1 cap(s) orally once a day (19 Aug 2021 06:01)  Ventolin HFA 90 mcg/inh inhalation aerosol: 2 puff(s) inhaled every 6 hours, As Needed (19 Aug 2021 06:01)      SOCIAL HX:    Patient lives in [place], used to work in the [employer]i/. He is a current every day smoker, smokes 10cigs/day x 70 years. Denies any alcohol or drug use. Lives alone.    PAST MEDICAL & SURGICAL HISTORY:  COPD with asthma    CAD (coronary artery disease)        FAMILY HISTORY:  :    No known cardiovascular or pulmonary family history     ROS:  See HPI     PHYSICAL EXAM    ICU Vital Signs Last 24 Hrs  T(C): 36.7 (19 Aug 2021 01:45), Max: 37.1 (18 Aug 2021 17:25)  T(F): 98.1 (19 Aug 2021 01:45), Max: 98.8 (18 Aug 2021 17:25)  HR: 70 (19 Aug 2021 01:45) (58 - 112)  BP: 127/69 (19 Aug 2021 01:45) (101/63 - 127/69)  BP(mean): --  ABP: --  ABP(mean): --  RR: 20 (19 Aug 2021 01:45) (20 - 24)  SpO2: 93% (19 Aug 2021 01:45) (90% - 96%)      General: Increased work of breathing, anxious, in distress, on NRB --> BiPAP   HEENT: NC/AT; anicteric sclera; MMM  Neck: supple  Cardiovascular: +S1/S2; Regular rhythm, tachycardic   Respiratory: tachypneic with distant breath sounds   Gastrointestinal: soft, NT/ND; +BSx4  Extremities: WWP; no edema, clubbing or cyanosis  Vascular: 2+ radial, DP/PT pulses B/L  Neurological: AAOx3        LABS:                          8.0    5.83  )-----------( 254      ( 18 Aug 2021 15:27 )             26.1                                               08-18    144  |  102  |  17  ----------------------------<  83  4.5   |  26  |  0.70    Ca    8.8      18 Aug 2021 15:27  Mg     2.0     08-18    TPro  6.6  /  Alb  2.5<L>  /  TBili  0.3  /  DBili  x   /  AST  25  /  ALT  17  /  AlkPhos  366<H>  08-18      PT/INR - ( 18 Aug 2021 18:24 )   PT: 16.0 sec;   INR: 1.35          PTT - ( 19 Aug 2021 02:38 )  PTT:38.7 sec                                           CARDIAC MARKERS ( 18 Aug 2021 15:35 )  x     / x     / 91 U/L / x     / x      CARDIAC MARKERS ( 18 Aug 2021 15:27 )  <0.017 ng/mL / x     / x     / x     / x                                                LIVER FUNCTIONS - ( 18 Aug 2021 15:27 )  Alb: 2.5 g/dL / Pro: 6.6 g/dL / ALK PHOS: 366 U/L / ALT: 17 U/L / AST: 25 U/L / GGT: x                                                                                                                                       ABG - ( 19 Aug 2021 06:54 )  pH, Arterial: 7.20  pH, Blood: x     /  pCO2: 18    /  pO2: 179   / HCO3: 7     / Base Excess: -18.8 /  SaO2: 96.8                MEDICATIONS  (STANDING):  albuterol/ipratropium for Nebulization. 3 milliLiter(s) Nebulizer once  aspirin enteric coated 81 milliGRAM(s) Oral daily  atorvastatin 80 milliGRAM(s) Oral at bedtime  budesonide 160 MICROgram(s)/formoterol 4.5 MICROgram(s) Inhaler 2 Puff(s) Inhalation two times a day  heparin  Infusion 1200 Unit(s)/Hr (12 mL/Hr) IV Continuous <Continuous>  morphine  - Injectable 1 milliGRAM(s) IV Push once  sodium chloride 0.9% Bolus 500 milliLiter(s) IV Bolus once  tamsulosin 0.4 milliGRAM(s) Oral at bedtime    MEDICATIONS  (PRN):  acetaminophen   Tablet .. 650 milliGRAM(s) Oral every 6 hours PRN Temp greater or equal to 38C (100.4F), Mild Pain (1 - 3)  ALBUTerol    90 MICROgram(s) HFA Inhaler 2 Puff(s) Inhalation every 6 hours PRN Shortness of Breath and/or Wheezing      Radiology: Reviewed

## 2021-08-19 NOTE — H&P ADULT - PROBLEM SELECTOR PLAN 3
Current every day smoker who presents with shortness of breath - denies a Current every day smoker who presents with shortness of breath - denies any noncompliance with medications (albuterol PRN and symbicort). Unknown follow up, patient could not verbalize.   -CTPE shows evidence of advanced pulmonary emphysema with e/o airway disease, bronchial wall thickening and GGO in upper lobes   -S/p DuoNebs in ED   -Of note, patient was observed to be very short of breath while walking around room, nasal cannula placed back on patient   Plan:  -Continue symbicort inpatient, and albuterol PRN inpatient   -Patient may need home oxygen (did not use any prior to admission) - continue to monitor ADDENDUM: transferred to ICU for further evaluation

## 2021-08-19 NOTE — H&P ADULT - PROBLEM SELECTOR PLAN 6
Patient with extensive smoking history who presents with acute PE, likely provoked from underlying malignancy. He recalls a 20 lb weight loss over the past 1 month, denies fever or chills.   -CTPE with evidence of 6mm nodule in RLL that is an increase from 4mm nodular density from study in 2017. Now with extensive bony mets c/w metastatic disease to spine, ribs and sternum.  -Patient informed of likely cancer diagnosis, states that he is not surprised given his extensive smoking history   Plan:  -Consult heme/onc in AM or arrange for outpatient follow up if patient is interested in getting treatment F: none   E: replete PRN  N: DASH/TLC diet   DVT: heparin gtt   GI: none     Dispo: [facility]  Code status: full code Patient with CAD, hx of 3 stents many years ago.   -Continue home aspirin   -F/u EKG

## 2021-08-19 NOTE — CONSULT NOTE ADULT - PROBLEM SELECTOR RECOMMENDATION 2
Noted for acute Hgb drop 8->6.3 in setting of active melena. s/p pRBC transfusion. NGT placed showing dark gastric contents. GI team consulted for c/f active UGIB.  - f/u CTA abdomen  - f/u GI recs  - transfuse pRBC and further stabilize as per primary team Pt requiring intubation overnight for increased WOB in setting of acute abdominal and back pain. ABG does not give clear picture whether precipitated in setting of hypercapnia and/or hypoxia. Sedated on propofol gtt.  - sedation weaning/trial extubation per primary team

## 2021-08-19 NOTE — PROGRESS NOTE ADULT - SUBJECTIVE AND OBJECTIVE BOX
Patient is a 81y old  Male who presents with a chief complaint of pulmonary embolism (19 Aug 2021 14:14)    Subjective: Patient intubated and sedated in AM due to increased WOB and hypoxia.     ICU Vital Signs Last 24 Hrs  T(C): 37.3 (19 Aug 2021 14:14), Max: 37.3 (19 Aug 2021 14:14)  T(F): 99.1 (19 Aug 2021 14:14), Max: 99.1 (19 Aug 2021 14:14)  HR: 82 (19 Aug 2021 15:00) (58 - 119)  BP: 71/32 (19 Aug 2021 09:00) (71/32 - 139/107)  BP(mean): 47 (19 Aug 2021 09:00) (47 - 119)  ABP: 116/61 (19 Aug 2021 15:00) (116/61 - 129/67)  ABP(mean): 79 (19 Aug 2021 15:00) (79 - 88)  RR: 17 (19 Aug 2021 15:00) (16 - 45)  SpO2: 100% (19 Aug 2021 15:00) (93% - 100%)    I&O's Summary    Mode: AC/ CMV (Assist Control/ Continuous Mandatory Ventilation)  RR (machine): 16  TV (machine): 450  FiO2: 100  PEEP: 5  ITime: 1  MAP: 8  PIP: 15    PHYSICAL EXAM:  GENERAL: patient intubated, sedated  EYES: EOMI, PERRLA, conjunctiva and sclera clear  NECK: Supple, No JVD, Normal thyroid  HEART: Regular rate and rhythm; No murmurs, rubs, or gallops  RESPIRATORY: CTA B/L, No W/R/R  ABDOMEN: Soft, tender, Nondistended; midline scar  NEUROLOGY: unable to assess, prior to intubation no focal deficits, moving all extremities  EXTREMITIES:  2+ Peripheral Pulses, clubbing of fingers, 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    LABS:                        8.4    7.93  )-----------( 223      ( 19 Aug 2021 12:41 )             25.6     08-19    142  |  106  |  35<H>  ----------------------------<  263<H>  4.6   |  23  |  0.99    Ca    7.3<L>      19 Aug 2021 12:41  Mg     2.0     08-18    TPro  4.5<L>  /  Alb  2.4<L>  /  TBili  0.7  /  DBili  x   /  AST  150<H>  /  ALT  153<H>  /  AlkPhos  263<H>  08-19    PT/INR - ( 19 Aug 2021 06:59 )   PT: 17.9 sec;   INR: 1.52          PTT - ( 19 Aug 2021 06:59 )  PTT:43.5 sec    CAPILLARY BLOOD GLUCOSE      POCT Blood Glucose.: 224 mg/dL (19 Aug 2021 13:19)    ABG - ( 19 Aug 2021 12:44 )  pH, Arterial: 7.39  pH, Blood: x     /  pCO2: 36    /  pO2: 370   / HCO3: 22    / Base Excess: -2.6  /  SaO2: 99.8        Consultant(s) Notes Reviewed:  [x ] YES  [ ] NO    MEDICATIONS  (STANDING):  albuterol/ipratropium for Nebulization 3 milliLiter(s) Nebulizer every 6 hours  albuterol/ipratropium for Nebulization. 3 milliLiter(s) Nebulizer once  chlorhexidine 0.12% Liquid 15 milliLiter(s) Oral Mucosa every 12 hours  chlorhexidine 2% Cloths 1 Application(s) Topical <User Schedule>  dextrose 40% Gel 15 Gram(s) Oral once  dextrose 5%. 1000 milliLiter(s) (50 mL/Hr) IV Continuous <Continuous>  dextrose 5%. 1000 milliLiter(s) (100 mL/Hr) IV Continuous <Continuous>  dextrose 50% Injectable 25 Gram(s) IV Push once  dextrose 50% Injectable 12.5 Gram(s) IV Push once  dextrose 50% Injectable 25 Gram(s) IV Push once  glucagon  Injectable 1 milliGRAM(s) IntraMuscular once  insulin lispro (ADMELOG) corrective regimen sliding scale   SubCutaneous every 6 hours  norepinephrine Infusion 0.05 MICROgram(s)/kG/Min (5.1 mL/Hr) IV Continuous <Continuous>  pantoprazole  Injectable 40 milliGRAM(s) IV Push every 12 hours  phenylephrine    Infusion 0.1 MICROgram(s)/kG/Min (1.02 mL/Hr) IV Continuous <Continuous>  propofol Infusion 10 MICROgram(s)/kG/Min (3.26 mL/Hr) IV Continuous <Continuous>  sodium chloride 0.9% Bolus 500 milliLiter(s) IV Bolus once    MEDICATIONS  (PRN):      Care Discussed with Consultants/Other Providers [ x] YES  [ ] NO    RADIOLOGY & ADDITIONAL TESTS:

## 2021-08-19 NOTE — H&P ADULT - NSHPPHYSICALEXAM_GEN_ALL_CORE
General: thin male resting in bed in NAD.   HEENT: NC/AT; PERRL, anicteric sclera; dry mucous membranes   Neck: supple  Cardiovascular: +S1/S2; RRR, no chest wall tenderness   Respiratory: CTA B/L; no W/R/R  Gastrointestinal: soft, NT/ND; +BSx4. Estrada in place.   Extremities: WWP; no edema, clubbing or cyanosis  Vascular: 2+ radial, DP/PT pulses B/L  Neurological: AAOx3; no focal deficits

## 2021-08-19 NOTE — CONSULT NOTE ADULT - ASSESSMENT
80 y/o Bengali M (hard of hearing) with PMHx CAD (s/p 2 stents), asthma/COPD (current smoker) who presents for several days of worsening shortness of breath.     #Anemia  With acute drop in H/H from 8.0 to 6.3, noted this morning during RR. BUN/Cr ratio elevated, c/f UGIB. On ASA 81 mg daily at home for CAD, unknown if on NSAIDS or AC at home.   -f/u CTA A/P  -Add on Reticulocyte count to morning labs  -Continue to closely monitor H/H  -Maintain active T&S  -Transfusion goal as per primary team  -2 large bore IVs  -Keep NPO  -    INCOMPLETE NOTE, PENDING DISCUSSION WITH C ATTENDING    Isabella Shirley DO  Gastroenterology Fellow  Pager: [phone] 80 y/o Kazakh M (hard of hearing) with PMHx CAD (s/p 2 stents), asthma/COPD (current smoker) who presents for several days of worsening shortness of breath, CT PE notable for RUL segmental PE, HC c/b acute hypoxic respiratory failure requiring intubation and acute drop in H/H and uptrend in BUN/Cr c/f UGIB. GI consulted for consideration of endoscopic evaluation.     #Anemia  With acute drop in H/H from 8.0 to 6.3, noted this morning during RR. BUN/Cr ratio elevated, c/f UGIB. On ASA 81 mg daily at home for CAD, unknown if on NSAIDS or AC at home.   -f/u CTA A/P  -Add on Reticulocyte count to morning labs  -No utility with obtaining Fe studies as has already received pRBC transfusions  -Continue to closely monitor H/H  -Maintain active T&S  -Transfusion goal as per primary team  -2 large bore IVs  -Keep NPO  -Will plan for bedside EGD today    Case discussed with svc attending and primary team.     Isabella Shirley DO  Gastroenterology Fellow  Pager: [phone]

## 2021-08-19 NOTE — PROGRESS NOTE ADULT - ASSESSMENT
80 y/o Pashto M (hard of hearing) with PMHx CAD (s/p 2 stents), asthma/COPD (current smoker) who presents for several days of worsening shortness of breath. Admitted to [facility] for acute hypoxic respiratory failure 2/2 to provoked, acute PE of the RUL without tropinemia or RHS. Suddenly tachypenic with increased work of breathing. Patient intubated and sedated. In MICU for further workup of GI bleed and respiratory distress.    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   -No active issues, continue to monitor.     CARDIOVASCULAR  #CAD (coronary artery disease).  Patient with CAD, hx of 3 stents many years ago.   - AC held in setting of bleed    RESPIRATORY   #Acute hypoxic respiratory failure 2/2 to PE. Patient originally stable on 2L NC after being diagnosed with RUL PE. Started on heparin gtt. Patient now with increased work of breathing not responding to BiPAP. ABG 7.2/18/179.   - intubated  - ECHO: no pertinent findings, EF 60-65%  - would follow-up lactate and troponin   - given sudden nature of respiratory decompensation, cannot rule out abdominal source of lactate causing acidosis, will obtain CTA abdomen.     #Acute pulmonary embolism.  Patient with acute PE of RUL without evidence of RHS. 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 Hypoxic to 90% on RA, tachy to 112 on admission. No leg swelling at this time. PERC team activated in hospital, no indication for intervention at this time.   -c/w heparin gtt and switch to Eliquis 10mg BID when appropriate   -F/u TTE to r/o RHS, though CTPE negative for RHS   -F/u PERC team recommendations.     #Asthma with COPD.  Current every day smoker who presents with shortness of breath - denies any noncompliance with medications (albuterol PRN and symbicort). Unknown follow up, patient could not verbalize.   -CTPE shows evidence of advanced pulmonary emphysema with e/o airway disease, bronchial wall thickening and GGO in upper lobes   -S/p DuoNebs in ED   -Of note, patient was observed to be very short of breath while walking around room, nasal cannula placed back on patient   -Continue symbicort inpatient, and albuterol PRN inpatient   -Patient may need home oxygen (did not use any prior to admission) - continue to monitor.     #R/O Lung cancer metastatic to bone.  Patient with extensive smoking history who presents with acute PE, likely provoked from underlying malignancy. He recalls a 20 lb weight loss over the past 1 month, denies fever or chills.   -CTPE with evidence of 6mm nodule in RLL that is an increase from 4mm nodular density from study in 2017. Now with extensive bony mets c/w metastatic disease to spine, ribs and sternum.  -Patient informed of likely cancer diagnosis, states that he is not surprised given his extensive smoking history   -Consult heme/onc  -Palliative care consulted, f/u recommendations     RENAL   #Urinary retention  Patient arrived with agudelo catheter in place. Prior CT scan in 2007 shows enlarged prostate.   -Outpatient records show recent tamsulosin 0.4mg prescription  -Restarted tamsulosin 0.4mg     HEMATOLOGY/ONCOLOGY  # Normocytic anemia.  Hemoglobin 8 on admission, unknown baseline. 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 No s/s of active bleeding.    -F/u iron studies, B12, folate, retic, haptoglobin.    METABOLIC  # Metabolic acidosis.   Lactate of 17 after patient decompensated on RMF. ABG 7.2/18/179  Likely due to acute respiratory decompensation. Patient with multiple etiologies including lung CA, PE, COPD. Additional etiology to consider mesenteric ischemia i/s/o Hb drop and profoundly elevated lactate.   - transfer to MICU and intubate  - trend lactate  - fluid resuscitation    PROPHYLACTIC  FLUIDS: none at this time  ELECTROLYTES: Mg<2, K<4  DIET: NPO, to be intubated  GI PPX: Protonix while intubated  VTE PPX: SCD, started on heparin gtt for PE  CODE: FULL  DISPO: MICU 80 y/o Sinhala M (hard of hearing) with PMHx CAD (s/p 2 stents), asthma/COPD (current smoker) who presents for several days of worsening shortness of breath. Admitted to [facility] for acute hypoxic respiratory failure 2/2 to provoked, acute PE of the RUL without tropinemia or RHS. Suddenly tachypneic with increased work of breathing. Patient intubated and sedated. Patient in hemorrhagic shock 2/2 to gastric ulcer found on EGD later in afternoon.  In MICU for further workup of GI bleed and respiratory distress.    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   -No active issues, continue to monitor.     CARDIOVASCULAR  #CAD (coronary artery disease).  Patient with CAD, hx of 3 stents many years ago.   - AC held in setting of bleed    RESPIRATORY   #Acute hypoxic respiratory failure 2/2 to PE. Patient originally stable on 2L NC after being diagnosed with RUL PE. Started on heparin gtt. Patient now with increased work of breathing not responding to BiPAP. ABG 7.2/18/179.   - intubated  - ECHO: no pertinent findings, EF 60-65%    #Acute pulmonary embolism.  Patient with acute PE of RUL without evidence of RHS. 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 Hypoxic to 90% on RA, tachy to 112 on admission. No leg swelling at this time. PERC team activated in hospital, no indication for intervention at this time.   - no intervention at present due to GI bleed   - continue to monitor    #Asthma with COPD.  Current every day smoker who presents with shortness of breath - denies any noncompliance with medications (albuterol PRN and symbicort). Unknown follow up, patient could not verbalize.   -CTPE shows evidence of advanced pulmonary emphysema with e/o airway disease, bronchial wall thickening and GGO in upper lobes   -S/p DuoNebs in ED   -Continue symbicort inpatient, and albuterol PRN inpatient   -Patient may need home oxygen (did not use any prior to admission) - continue to monitor.   -currently intubated    GI  #gastric ulcer  - patient with bleeding gastric ulcer found on EGD with GI   - bleed exacerbated by heparin drip started for treatment of PE causing patient to go into hemorrhagic shock    RENAL   #Urinary retention  Patient arrived with agudelo catheter in place. Prior CT scan in 2007 shows enlarged prostate.   -Outpatient records show recent tamsulosin 0.4mg prescription  -Restarted tamsulosin 0.4mg     HEMATOLOGY/ONCOLOGY  # Anemia   Hemoglobin 6.3 in AM due to bleeding GI ulcer, likely mixed picture with AOCD in setting of likely malignancy   - transfused 2U in AM   - post transfusion CBC 8.4   - continue to monitor   - f/u iron studies, B12, folate, retic, haptoglobin.    #R/O Lung cancer vs prostate cancer metastatic to bone.  Patient with extensive smoking history who presents with acute PE, likely provoked from underlying malignancy. He recalls a 20 lb weight loss over the past 1 month, denies fever or chills.   -CTPE with evidence of 6mm nodule in RLL that is an increase from 4mm nodular density from study in 2017. Now with extensive bony mets c/w metastatic disease to spine, ribs and sternum.  -CT ab/pelvis shows  prostate is enlarged, measuring 7.0 x 5.1x 4.5 cm, giving calculated volume of 84 cc. The prostate is heterogeneous, with calcification centrally, consistent with hyperplasia  -Patient informed of likely cancer diagnosis, states that he is not surprised given his extensive smoking history   -Consult heme/onc  -Palliative care consulted, f/u recommendations     METABOLIC  # Metabolic acidosis. - RESOLVED  Lactate of 17 after patient decompensated on RMF. ABG 7.2/18/179  Likely due to acute respiratory decompensation. Patient with multiple etiologies including lung CA, PE, COPD. Additional etiology to consider mesenteric ischemia i/s/o Hb drop and profoundly elevated lactate.   -lactate downtrending currently 3.4    PROPHYLACTIC  FLUIDS: none at this time  ELECTROLYTES: Mg<2, K<4  DIET: NPO, to be intubated  GI PPX: Protonix while intubated  VTE PPX: SCD, started on heparin gtt for PE  CODE: FULL  DISPO: MICU 80 y/o Portuguese M (hard of hearing) with PMHx CAD (s/p 2 stents), asthma/COPD (current smoker) who presents for several days of worsening shortness of breath. Admitted to [facility] for acute hypoxic respiratory failure 2/2 to provoked, acute PE of the RUL without tropinemia or RHS. Suddenly tachypneic with increased work of breathing. Patient intubated and sedated. Patient in hemorrhagic shock 2/2 to gastric ulcer found on EGD later in afternoon.  In MICU for further workup of GI bleed and respiratory distress.    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   -No active issues, continue to monitor.     CARDIOVASCULAR  #CAD (coronary artery disease).  Patient with CAD, hx of 3 stents many years ago.   - AC held in setting of bleed    RESPIRATORY   #Acute hypoxic respiratory failure 2/2 to PE. Patient originally stable on 2L NC after being diagnosed with RUL PE. Started on heparin gtt. Patient now with increased work of breathing not responding to BiPAP. ABG 7.2/18/179.   - intubated  - ECHO: no pertinent findings, EF 60-65%    #Acute pulmonary embolism.  Patient with acute PE of RUL without evidence of RHS. 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 Hypoxic to 90% on RA, tachy to 112 on admission. No leg swelling at this time. PERC team activated in hospital, no indication for intervention at this time.   - no intervention at present due to GI bleed   - f/u LE doppler  - continue to monitor    #Asthma with COPD.  Current every day smoker who presents with shortness of breath - denies any noncompliance with medications (albuterol PRN and symbicort). Unknown follow up, patient could not verbalize.   -CTPE shows evidence of advanced pulmonary emphysema with e/o airway disease, bronchial wall thickening and GGO in upper lobes   -S/p DuoNebs in ED   -Continue symbicort inpatient, and albuterol PRN inpatient   -Patient may need home oxygen (did not use any prior to admission) - continue to monitor.   -currently intubated    GI  #gastric ulcer  - patient with bleeding gastric ulcer found on EGD with GI   - bleed exacerbated by heparin drip started for treatment of PE causing patient to go into hemorrhagic shock    RENAL   #Urinary retention  Patient arrived with agudelo catheter in place. Prior CT scan in 2007 shows enlarged prostate.   -Outpatient records show recent tamsulosin 0.4mg prescription  -Restarted tamsulosin 0.4mg     HEMATOLOGY/ONCOLOGY  # Anemia   Hemoglobin 6.3 in AM due to bleeding GI ulcer, likely mixed picture with AOCD in setting of likely malignancy   - transfused 2U in AM   - post transfusion CBC 8.4   - continue to monitor   - f/u iron studies, B12, folate, retic, haptoglobin.    #R/O Lung cancer vs prostate cancer metastatic to bone.  Patient with extensive smoking history who presents with acute PE, likely provoked from underlying malignancy. He recalls a 20 lb weight loss over the past 1 month, denies fever or chills.   -CTPE with evidence of 6mm nodule in RLL that is an increase from 4mm nodular density from study in 2017. Now with extensive bony mets c/w metastatic disease to spine, ribs and sternum.  -CT ab/pelvis shows  prostate is enlarged, measuring 7.0 x 5.1x 4.5 cm, giving calculated volume of 84 cc. The prostate is heterogeneous, with calcification centrally, consistent with hyperplasia  -Patient informed of likely cancer diagnosis, states that he is not surprised given his extensive smoking history   -Consult heme/onc  -Palliative care consulted, f/u recommendations     METABOLIC  # Metabolic acidosis. - RESOLVED  Lactate of 17 after patient decompensated on RMF. ABG 7.2/18/179  Likely due to acute respiratory decompensation. Patient with multiple etiologies including lung CA, PE, COPD. Additional etiology to consider mesenteric ischemia i/s/o Hb drop and profoundly elevated lactate.   -lactate downtrending currently 3.4    PROPHYLACTIC  FLUIDS: none at this time  ELECTROLYTES: Mg<2, K<4  DIET: NPO, to be intubated  GI PPX: Protonix while intubated  VTE PPX: SCD, started on heparin gtt for PE  CODE: FULL  DISPO: MICU

## 2021-08-20 NOTE — PROGRESS NOTE ADULT - ASSESSMENT
80 y/o Sinhala M (hard of hearing) with PMHx CAD (s/p 2 stents), asthma/COPD (current smoker) who presents for several days of worsening shortness of breath, CT PE notable for RUL segmental PE, HC c/b acute hypoxic respiratory failure requiring intubation and acute drop in H/H and uptrend in BUN/Cr c/f UGIB. GI consulted for consideration of endoscopic evaluation.     #Anemia  With acute drop in H/H from 8.0 to 6.3, noted this morning during RR. BUN/Cr ratio elevated, c/f UGIB. On ASA 81 mg daily at home for CAD, unknown if on NSAIDS or AC at home.   -CT A/P (8/18) - with no e/o mesenteric ischemia. Pyelonephritis b/l. Bladder wall thickening c/w cystitis. Heterogeneous prostate with areas of low-density, ?infection, Extensive blastic osseous lesions, c/w metastatic prostate cancer.Advanced atherosclerotic disease.  -s/p EGD (8/19) - notable for Pablo Class IIb ulcer, approximately 15 mm in size, with no active bleeding. Noted in the antrum.   -H/H stable overnight, pending trend in H/H, will consider repeat EGD early next week  -Reticulocyte index 1.96, c/w hypoproliferative process  -No utility with obtaining Fe studies as has already received pRBC transfusions  -Continue to closely monitor H/H  -Maintain active T&S  -Transfusion goal as per primary team  -2 large bore IVs    #Transaminitis  With uptrend in AST/ALT overnight, with anticipated further uptrend in the setting of shock 2/2 GIB/hypovolemia. T bilirubin normal, unlikely obstructive process.  -Daily CMP and Coags   -If with uptrend in T bilirubin, would recommend obtaining Abdominal US with dopplers    #Alk Phosphatase elevation  Alk Phos elevated on admission, unclear if hepatobiliary vs osseous in origin (likely the latter, in light osseous blastic lesions noted on CT imaging).  -Please obtain GGT level    INCOMPLETE NOTE, PENDING DISCUSSION WITH C ATTENDING    Isabella Shirley DO  Gastroenterology Fellow  Pager: [phone] 80 y/o Hungarian M (hard of hearing) with PMHx CAD (s/p 2 stents), asthma/COPD (current smoker) who presents for several days of worsening shortness of breath, CT PE notable for RUL segmental PE, HC c/b acute hypoxic respiratory failure requiring intubation and acute drop in H/H and uptrend in BUN/Cr c/f UGIB. GI consulted for consideration of endoscopic evaluation.     #Anemia  With acute drop in H/H from 8.0 to 6.3, noted this morning during RR. BUN/Cr ratio elevated, c/f UGIB. On ASA 81 mg daily at home for CAD, unknown if on NSAIDS or AC at home.   -CT A/P (8/18) - with no e/o mesenteric ischemia. Pyelonephritis b/l. Bladder wall thickening c/w cystitis. Heterogeneous prostate with areas of low-density, ?infection, Extensive blastic osseous lesions, c/w metastatic prostate cancer.Advanced atherosclerotic disease.  -s/p EGD (8/19) - notable for Pablo Class IIb ulcer, approximately 15 mm in size, with no active bleeding. Noted in the antrum.   -H/H stable overnight, pending trend in H/H, will consider repeat EGD early next week  -Reticulocyte index 1.96, c/w hypoproliferative process  -No utility with obtaining Fe studies as has already received pRBC transfusions  -Continue to closely monitor H/H  -Maintain active T&S  -Transfusion goal as per primary team  -2 large bore IVs    #Transaminitis  With uptrend in AST/ALT overnight, with anticipated further uptrend in the setting of shock 2/2 GIB/hypovolemia. T bilirubin normal, unlikely obstructive process.  -Daily CMP and Coags   -If with uptrend in T bilirubin, would recommend obtaining Abdominal US with dopplers    #Alk Phosphatase elevation  Alk Phos elevated on admission, unclear if hepatobiliary vs osseous in origin (likely the latter, in light osseous blastic lesions noted on CT imaging).  -Please obtain GGT level    Case discussed with c attending and primary team.     Isabella Shirley DO  Gastroenterology Fellow  Pager: [phone]

## 2021-08-20 NOTE — DIETITIAN INITIAL EVALUATION ADULT. - ADD RECOMMEND
1. Please see EN recs above. Will adjust pending propofol titration 2. Monitor lytes and replete prn. POC BG q6hrs 3. Pain and bowel regimens per team 4. Obtain nutrition hx at f/u as appropriate

## 2021-08-20 NOTE — DIETITIAN INITIAL EVALUATION ADULT. - OTHER CALCULATIONS
ABW used to estimate needs as pt weighs <100% of IBW, vented. Needs estimated for age and adjusted for vent demands, possible malignancy, protein-calorie malnutrition. Fluids per team 2/2 shock and pressor use

## 2021-08-20 NOTE — DIETITIAN NUTRITION RISK NOTIFICATION - TREATMENT: THE FOLLOWING DIET HAS BEEN RECOMMENDED
1. At current rate of propofol, recommend starting Jevity 1.2 Carlin @ 29ml/hr x 24hrs plus 2 LPS (200kcal, 30g pro) via NG. Provides: 696ml TV, 1035kcal/1423kcal w/propofol, 69g pro, 562ml free H2O, 70% RDI, 1.41g/kg ABW pro. Recommend starting at 10mL/hr and advancing by [id number] to goal as tolerated. Additional free H2O per team. Monitor for s/s intolerance; maintain aspiration precautions at all times. Will adjust goal rate pending propofol titration  2. Monitor lytes and replete prn. POC BG q6hrs   3. Pain and bowel regimens per team   4. Obtain nutrition hx at f/u as appropriate

## 2021-08-20 NOTE — PROGRESS NOTE ADULT - ASSESSMENT
80 y/o [place]isw[place] M (hard of hearing) with PMHx CAD (s/p 2 stents), asthma/COPD (current smoker) who presents for several days of worsening shortness of breath. Admitted to [facility] for acute hypoxic respiratory failure 2/2 to provoked, acute PE of the RUL without tropinemia or RHS. Suddenly tachypneic with increased work of breathing. Patient intubated and sedated. Patient in hemorrhagic shock 2/2 to gastric ulcer found on EGD later in afternoon.  In MICU for further workup of GI bleed and respiratory distress.    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   -No active issues, continue to monitor.    #Sedated  Currently on Propofol 50     CARDIOVASCULAR  #CAD (coronary artery disease).  Patient with CAD, hx of 3 stents many years ago.   - AC held in setting of bleed    #Hemorrhagic shock 2/2 to UGIB/Septic shock/medication induced shock    Pt had large antral stomach ulcer that hemorrhaged s/p heparin gtt. Pt initially with downtrending levophed requirements from resolving bleed. However, on 8/20 levo requirements started increasing likely i/s/o of sepsis from possible intra-abdominal source vs. increased propofol   - Titrate levophed to sBP 90s   - Sepsis management as below     RESPIRATORY   #Acute hypoxic respiratory failure 2/2 to PE. Patient originally stable on 2L NC after being diagnosed with RUL PE. Started on heparin gtt. Patient now with increased work of breathing not responding to BiPAP. ABG 7.2/18/179.   - intubated, attempted CPAP trial on 8/20 with result of tachypnea, placed back on VC/AC  - ECHO: no pertinent findings, EF 60-65%    #Acute pulmonary embolism.  Patient with acute PE of RUL without evidence of RHS. 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 Hypoxic to 90% on RA, tachy to 112 on admission. No leg swelling at this time. PERC team activated in hospital, no indication for intervention at this time. LE dopplers negative.   - no intervention at present due to GI bleed   - continue to monitor    #Asthma with COPD.  Current every day smoker who presents with shortness of breath - denies any noncompliance with medications (albuterol PRN and symbicort). Unknown follow up, patient could not verbalize.   -CTPE shows evidence of advanced pulmonary emphysema with e/o airway disease, bronchial wall thickening and GGO in upper lobes   -S/p DuoNebs in ED   -Continue symbicort inpatient, and albuterol PRN inpatient   -Patient may need home oxygen (did not use any prior to admission) - continue to monitor.   -currently intubated    GI  #Gastric Ulcer  Patient with bleeding gastric ulcer found on EGD with GI. Bleed exacerbated by heparin drip started for treatment of PE causing patient to go into hemorrhagic shock. S/p endoscopy showing Pablo Class IIb ulcer, approximately 15 mm in size, with no active bleeding. Noted in the antrum.    - C/w protonix gtt x72 hours, ending on 8/21   - F/u GI recs, likely scope next week     ID  #Possible intra-abdominal infection  Pt spiking fever of 103 on 8/20 with no change in O2 requirements on vent, CXR with no consolidations, UA negative for infection. Given GI bleed possible translocation of bacteria from the GI tract  - Started CFTX and Flagyl to cover for intra-abdominal infection   - F/u bcx     RENAL   #Urinary retention  Patient arrived with agudelo catheter in place. Prior CT scan in 2007 shows enlarged prostate.   -Outpatient records show recent tamsulosin 0.4mg prescription  -Restarted tamsulosin 0.4mg     HEMATOLOGY/ONCOLOGY  # Anemia   Hemoglobin 6.3 in AM due to bleeding GI ulcer, likely mixed picture with AOCD in setting of likely malignancy. S/p 2U on 8/19. Hgb staying stable in mid 8s to low 9s, RI showing hypoproliferative.   - Continue to monitor CBC q12h   - Ulcer management as above       #R/O Lung cancer vs prostate cancer metastatic to bone.  Patient with extensive smoking history who presents with acute PE, likely provoked from underlying malignancy. He recalls a 20 lb weight loss over the past 1 month, denies fever or chills.   -CTPE with evidence of 6mm nodule in RLL that is an increase from 4mm nodular density from study in 2017. Now with extensive bony mets c/w metastatic disease to spine, ribs and sternum.  -CT ab/pelvis shows  prostate is enlarged, measuring 7.0 x 5.1x 4.5 cm, giving calculated volume of 84 cc. The prostate is heterogeneous, with calcification centrally, consistent with hyperplasia  -Patient informed of likely cancer diagnosis, states that he is not surprised given his extensive smoking history   -Consult heme/onc  -Palliative care consulted, f/u recommendations     METABOLIC  # Metabolic acidosis. - RESOLVED  Lactate of 17 after patient decompensated on RMF. ABG 7.2/18/179  Likely due to acute respiratory decompensation. Patient with multiple etiologies including lung CA, PE, COPD. Additional etiology to consider mesenteric ischemia i/s/o Hb drop and profoundly elevated lactate.   -lactate downtrending currently 3.4    PROPHYLACTIC  FLUIDS: none at this time  ELECTROLYTES: Mg<2, K<4  DIET: NPO, to be intubated  GI PPX: Protonix while intubated  VTE PPX: SCD, started on heparin gtt for PE  CODE: FULL  DISPO: MICU   82 y/o Persian M (hard of hearing) with PMHx CAD (s/p 2 stents), asthma/COPD (current smoker) who presented with acute hypoxic respiratory failure 2/2 to provoked, acute PE of the RUL without tropinemia or RHS, subsequently intubated while hemorrhagic shock 2/2 to gastric ulcer, now in MICU for management of GI ulcer and workup of new fever.     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   -No active issues, continue to monitor.    #Sedation  - c/w propofol  - c/w precedex    CARDIOVASCULAR  #CAD (coronary artery disease).  Patient with CAD, hx of 3 stents many years ago.   - AC held in setting of bleed    #Hemorrhagic shock 2/2 to UGIB/Septic shock/medication induced shock    Pt had large antral stomach ulcer that hemorrhaged s/p heparin gtt. Pt initially with downtrending levophed requirements from resolving bleed. However, on 8/20 levo requirements started increasing likely i/s/o of sepsis from possible intra-abdominal source vs. increased propofol   - Titrate levophed to sBP 90s   - Sepsis management as below     RESPIRATORY   #Acute hypoxic respiratory failure 2/2 to PE. Patient originally stable on 2L NC after being diagnosed with RUL PE. Started on heparin gtt. Patient now with increased work of breathing not responding to BiPAP. ABG 7.2/18/179.   - intubated, attempted CPAP trial on 8/20 with result of tachypnea, placed back on VC/AC  - ECHO: no pertinent findings, EF 60-65%    #Acute pulmonary embolism.  Patient with acute PE of RUL without evidence of RHS. 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 Hypoxic to 90% on RA, tachy to 112 on admission. No leg swelling at this time. PERC team activated in hospital, no indication for intervention at this time. LE dopplers negative.   - no intervention at present due to GI bleed   - continue to monitor    #Asthma with COPD.  Current every day smoker who presents with shortness of breath - denies any noncompliance with medications (albuterol PRN and symbicort). Unknown follow up, patient could not verbalize.   -CTPE shows evidence of advanced pulmonary emphysema with e/o airway disease, bronchial wall thickening and GGO in upper lobes   -S/p DuoNebs in ED   -Continue symbicort inpatient, and albuterol PRN inpatient   -Patient may need home oxygen (did not use any prior to admission) - continue to monitor.   -currently intubated    GI  #Gastric Ulcer  Patient with bleeding gastric ulcer found on EGD with GI. Bleed exacerbated by heparin drip started for treatment of PE causing patient to go into hemorrhagic shock. S/p endoscopy showing Pablo Class IIb ulcer, approximately 15 mm in size, with no active bleeding. Noted in the antrum.    - C/w protonix gtt x72 hours, ending on 8/21   - F/u GI recs, likely scope next week     ID  #Possible intra-abdominal infection  Pt spiking fever of 103 on 8/20 with no change in O2 requirements on vent, CXR with no consolidations, UA negative for infection. Given GI bleed possible translocation of bacteria from the GI tract  - Started CFTX and Flagyl to cover for intra-abdominal infection   - F/u bcx     RENAL   #Urinary retention  Patient arrived with agudelo catheter in place. Prior CT scan in 2007 shows enlarged prostate.   -Outpatient records show recent tamsulosin 0.4mg prescription  -Restarted tamsulosin 0.4mg     #enlarged prostate  Patient with enlarged prostate on CT findings in 2007 and this admission  - consider urology consult     HEMATOLOGY/ONCOLOGY  # Anemia   Hemoglobin 6.3 in AM due to bleeding GI ulcer, likely mixed picture with AOCD in setting of likely malignancy. S/p 2U on 8/19. Hgb staying stable in mid 8s to low 9s, RI showing hypoproliferative.   - Continue to monitor CBC q12h   - Ulcer management as above     #R/O Lung cancer vs prostate cancer metastatic to bone.  Patient with extensive smoking history who presents with acute PE, likely provoked from underlying malignancy. He recalls a 20 lb weight loss over the past 1 month, denies fever or chills.   -CTPE with evidence of 6mm nodule in RLL that is an increase from 4mm nodular density from study in 2017. Now with extensive bony mets c/w metastatic disease to spine, ribs and sternum.  -CT ab/pelvis shows  prostate is enlarged, measuring 7.0 x 5.1x 4.5 cm, giving calculated volume of 84 cc. The prostate is heterogeneous, with calcification centrally, consistent with hyperplasia  -Patient informed of likely cancer diagnosis, states that he is not surprised given his extensive smoking history   -Consult heme/onc  -Palliative care consulted, f/u recommendations     METABOLIC  # Metabolic acidosis. - RESOLVED  Lactate of 17 after patient decompensated on RMF. ABG 7.2/18/179  Likely due to acute respiratory decompensation. Patient with multiple etiologies including lung CA, PE, COPD. Additional etiology to consider mesenteric ischemia i/s/o Hb drop and profoundly elevated lactate.   -lactate downtrending currently 3.4    PROPHYLACTIC  FLUIDS: none at this time  ELECTROLYTES: Mg<2, K<4  DIET: NPO, to be intubated  GI PPX: Protonix while intubated  VTE PPX: SCD, started on heparin gtt for PE  CODE: FULL  DISPO: MICU

## 2021-08-20 NOTE — PROGRESS NOTE ADULT - PROBLEM SELECTOR PLAN 4
Pt noted for markedly elevated lactate 17 in setting of active abdominal pain. Per primary team c/f acute mesenteric ischemia vs. active UGIB in setting of melena and acute Hgb drop. CXR negative for acute consolidations/infiltrates. No signs of free air under diaphragm. CTA abdomen negative for mesenteric ischemia, however notable for extensive blastic osseous lesions, consistent with metastatic prostate cancer.  - continue to trend to clear, correlate w/ clinical improvement, further management as per primary team.

## 2021-08-20 NOTE — PROGRESS NOTE ADULT - PROBLEM SELECTOR PLAN 2
: Pt requiring intubation overnight for increased WOB in setting of acute abdominal and back pain. ABG does not give clear picture whether precipitated in setting of hypercapnia and/or hypoxia. Sedated on propofol gtt.  - sedation weaning/trial extubation per primary team.

## 2021-08-20 NOTE — DIETITIAN INITIAL EVALUATION ADULT. - PROBLEM SELECTOR PLAN 5
Current every day smoker who presents with shortness of breath - denies any noncompliance with medications (albuterol PRN and symbicort). Unknown follow up, patient could not verbalize.   -CTPE shows evidence of advanced pulmonary emphysema with e/o airway disease, bronchial wall thickening and GGO in upper lobes   -S/p DuoNebs in ED   -Of note, patient was observed to be very short of breath while walking around room, nasal cannula placed back on patient   Plan:  -Continue symbicort inpatient, and albuterol PRN inpatient   -Patient may need home oxygen (did not use any prior to admission) - continue to monitor

## 2021-08-20 NOTE — CONSULT NOTE ADULT - ASSESSMENT
per MICU    80 y/o Guyanese M (hard of hearing) with PMHx CAD (s/p 2 stents), asthma/COPD (current smoker) who presents for several days of worsening shortness of breath. Admitted to [facility] for acute hypoxic respiratory failure 2/2 to provoked, acute PE of the RUL without tropinemia or RHS. Suddenly tachypneic with increased work of breathing. Patient intubated and sedated. Patient in hemorrhagic shock 2/2 to gastric ulcer found on EGD later in afternoon.  In MICU for further workup of GI bleed and respiratory distress.    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   -No active issues, continue to monitor.     CARDIOVASCULAR  #CAD (coronary artery disease).  Patient with CAD, hx of 3 stents many years ago.   - AC held in setting of bleed    RESPIRATORY   #Acute hypoxic respiratory failure 2/2 to PE. Patient originally stable on 2L NC after being diagnosed with RUL PE. Started on heparin gtt. Patient now with increased work of breathing not responding to BiPAP. ABG 7.2/18/179.   - intubated  - ECHO: no pertinent findings, EF 60-65%    #Acute pulmonary embolism.  Patient with acute PE of RUL without evidence of RHS. 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 Hypoxic to 90% on RA, tachy to 112 on admission. No leg swelling at this time. PERC team activated in hospital, no indication for intervention at this time.   - no intervention at present due to GI bleed   - f/u LE doppler  - continue to monitor    #Asthma with COPD.  Current every day smoker who presents with shortness of breath - denies any noncompliance with medications (albuterol PRN and symbicort). Unknown follow up, patient could not verbalize.   -CTPE shows evidence of advanced pulmonary emphysema with e/o airway disease, bronchial wall thickening and GGO in upper lobes   -S/p DuoNebs in ED   -Continue symbicort inpatient, and albuterol PRN inpatient   -Patient may need home oxygen (did not use any prior to admission) - continue to monitor.   -currently intubated    GI  #gastric ulcer  - patient with bleeding gastric ulcer found on EGD with GI   - bleed exacerbated by heparin drip started for treatment of PE causing patient to go into hemorrhagic shock    RENAL   #Urinary retention  Patient arrived with agudelo catheter in place. Prior CT scan in 2007 shows enlarged prostate.   -Outpatient records show recent tamsulosin 0.4mg prescription  -Restarted tamsulosin 0.4mg     HEMATOLOGY/ONCOLOGY  # Anemia   Hemoglobin 6.3 in AM due to bleeding GI ulcer, likely mixed picture with AOCD in setting of likely malignancy   - transfused 2U in AM   - post transfusion CBC 8.4   - continue to monitor   - f/u iron studies, B12, folate, retic, haptoglobin.    #R/O Lung cancer vs prostate cancer metastatic to bone.  Patient with extensive smoking history who presents with acute PE, likely provoked from underlying malignancy. He recalls a 20 lb weight loss over the past 1 month, denies fever or chills.   -CTPE with evidence of 6mm nodule in RLL that is an increase from 4mm nodular density from study in 2017. Now with extensive bony mets c/w metastatic disease to spine, ribs and sternum.  -CT ab/pelvis shows  prostate is enlarged, measuring 7.0 x 5.1x 4.5 cm, giving calculated volume of 84 cc. The prostate is heterogeneous, with calcification centrally, consistent with hyperplasia  -Patient informed of likely cancer diagnosis, states that he is not surprised given his extensive smoking history   -Consult heme/onc  -Palliative care consulted, f/u recommendations     METABOLIC  # Metabolic acidosis. - RESOLVED  Lactate of 17 after patient decompensated on RMF. ABG 7.2/18/179  Likely due to acute respiratory decompensation. Patient with multiple etiologies including lung CA, PE, COPD. Additional etiology to consider mesenteric ischemia i/s/o Hb drop and profoundly elevated lactate.   -lactate downtrending currently 3.4    PROPHYLACTIC  FLUIDS: none at this time  ELECTROLYTES: Mg<2, K<4  DIET: NPO, to be intubated  GI PPX: Protonix while intubated  VTE PPX: SCD, started on heparin gtt for PE  CODE: FULL  DISPO: MICU

## 2021-08-20 NOTE — PROGRESS NOTE ADULT - SUBJECTIVE AND OBJECTIVE BOX
RICHARD LOUIS             [id number]    CC: pulmonary embolism    HPI:  80 y/o Uz[place] M (hard of hearing) with PMHx CAD (s/p 2 stents), asthma/COPD (current smoker) who presents for several days of worsening shortness of breath. He reports that his symptoms are present to when he has an asthma/COPD exacerbation. He denies any prolonged periods of immobility, recent activity, chest pain, leg pain, non-compliance with his home inhalers. He does admit to seeing his legs swell occasionally, but they are not swollen currently. He does admit to some abdominal pain without nausea or vomiting. He is a current every day cigarette smoker but states he "quit yesterday". He denies any fevers, chills, cough with any sputum etc. Of note, he does admit to a 20lb weight loss over the past month.     ED Course:  Vitals: /69; P 112, R 24, T 98, O2 90% RA   Labs: Hg 8 (unknown baseline), albumin 2.5, pro-  Imaging: CTPE with acute PE in segmental branch of RUL without evidence of RHS. Also e/o 6mm nodule in RLL that is larger than 4mm nodular density in prior study. 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 Extensive sclerotic bony mets throughout spine, sternum and ribs.   Meds: heparin bolus, decadron 10mg IV, tylenol, DuoNebs   EKG: not done  (19 Aug 2021 03:18)      SUBJECTIVE: Pt seen in AM at bedside. Intubated. Sedated on propofol and precedex gtt.    ROS:  DYSPNEA: Y / N	  NAUS/VOM: Y / N	  SECRETIONS: Y / N	  AGITATION: Y / N  Pain (Y/N):       http://geriatrictoolkit.[employer].edu/cog/painad.pdf    https://www.aci.health.nsw.gov.au/__data/assets/pdf_file/0018/821619/[place]_Pain_Scale_Final.pdf  -Provocation/Palliation:  -Quality/Quantity:  -Radiating:  -Severity:  -Timing/Frequency:  -Impact on ADLs:    OTHER REVIEW OF SYSTEMS:  UNABLE TO OBTAIN  due to:    PEx:  T(C): 39.5 (08-20-21 @ 13:00), Max: 39.5 (08-20-21 @ 13:00)  HR: 120 (08-20-21 @ 15:00) (86 - 122)  BP: 94/50 (08-20-21 @ 15:00) (81/52 - 108/56)  RR: 32 (08-20-21 @ 15:00) (9 - 32)  SpO2: 98% (08-20-21 @ 15:00) (81% - 100%)  Wt(kg): --    	     ALLERGIES: No Known Allergies      OPIATE NAÏVE (Y/N):    MEDICATIONS: REVIEWED  MEDICATIONS  (STANDING):  albuterol/ipratropium for Nebulization 3 milliLiter(s) Nebulizer every 6 hours  cefTRIAXone   IVPB 1000 milliGRAM(s) IV Intermittent every 24 hours  chlorhexidine 0.12% Liquid 15 milliLiter(s) Oral Mucosa every 12 hours  chlorhexidine 2% Cloths 1 Application(s) Topical <User Schedule>  dexMEDEtomidine Infusion 0.5 MICROgram(s)/kG/Hr (6.13 mL/Hr) IV Continuous <Continuous>  dextrose 40% Gel 15 Gram(s) Oral once  dextrose 5%. 1000 milliLiter(s) (50 mL/Hr) IV Continuous <Continuous>  dextrose 5%. 1000 milliLiter(s) (100 mL/Hr) IV Continuous <Continuous>  dextrose 50% Injectable 25 Gram(s) IV Push once  dextrose 50% Injectable 12.5 Gram(s) IV Push once  dextrose 50% Injectable 25 Gram(s) IV Push once  glucagon  Injectable 1 milliGRAM(s) IntraMuscular once  insulin lispro (ADMELOG) corrective regimen sliding scale   SubCutaneous every 6 hours  metroNIDAZOLE  IVPB 500 milliGRAM(s) IV Intermittent every 8 hours  norepinephrine Infusion 0.05 MICROgram(s)/kG/Min (5.1 mL/Hr) IV Continuous <Continuous>  pantoprazole Infusion 8 mG/Hr (10 mL/Hr) IV Continuous <Continuous>  propofol Infusion 10 MICROgram(s)/kG/Min (3.26 mL/Hr) IV Continuous <Continuous>    MEDICATIONS  (PRN):  sodium chloride 0.9% lock flush 10 milliLiter(s) IV Push every 1 hour PRN Pre/post blood products, medications, blood draw, and to maintain line patency      LABS: REVIEWED  CBC:                        8.9    9.62  )-----------( 241      ( 20 Aug 2021 06:14 )             28.0     CMP:    08-20    145  |  111<H>  |  45<H>  ----------------------------<  151<H>  3.8   |  19<L>  |  1.06    Ca    8.0<L>      20 Aug 2021 06:14  Phos  3.8     08-20  Mg     2.5     08-20    TPro  5.0<L>  /  Alb  2.3<L>  /  TBili  0.4  /  DBili  x   /  AST  379<H>  /  ALT  460<H>  /  AlkPhos  407<H>  08-20      IMAGING: REVIEWED    ADVANCED DIRECTIVES:            FULL CODE            DNR DNI            LIVING WILL            DPOA       HCP       MOLST    DECISION MAKER:   LEGAL SURROGATE:    PSYCHOSOCIAL-SPIRITUAL ASSESSMENT:       Reviewed       Care plan unchanged       Care plan adjusted as above	    GOALS OF CARE DISCUSSION       Palliative care info/counseling provided	           Family meeting       Advanced Directives addressed please see Advance Care Planning Note	           See previous Palliative Medicine Note       Documentation of GOC:    AGENCY CHOICE DISCUSSED:           Homecare        Hospice        [facility]        [facility]        Other:    REFERRALS	        Palliative Med        Unit SW/Case Mgmt              Speech/Swallow       Patient/Family Support       Massage Therapy       Music Therapy       Pet Therapy       Hospice       Nutrition       Dietician       PT/OT RICHARD LOUIS             [id number]    CC: pulmonary embolism    HPI:  82 y/o Romansh M (hard of hearing) with PMHx CAD (s/p 2 stents), asthma/COPD (current smoker) who presents for several days of worsening shortness of breath. He reports that his symptoms are present to when he has an asthma/COPD exacerbation. He denies any prolonged periods of immobility, recent activity, chest pain, leg pain, non-compliance with his home inhalers. He does admit to seeing his legs swell occasionally, but they are not swollen currently. He does admit to some abdominal pain without nausea or vomiting. He is a current every day cigarette smoker but states he "quit yesterday". He denies any fevers, chills, cough with any sputum etc. Of note, he does admit to a 20lb weight loss over the past month.     ED Course:  Vitals: /69; P 112, R 24, T 98, O2 90% RA   Labs: Hg 8 (unknown baseline), albumin 2.5, pro-  Imaging: CTPE with acute PE in segmental branch of RUL without evidence of RHS. Also e/o 6mm nodule in RLL that is larger than 4mm nodular density in prior study. 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 Extensive sclerotic bony mets throughout spine, sternum and ribs.   Meds: heparin bolus, decadron 10mg IV, tylenol, DuoNebs   EKG: not done  (19 Aug 2021 03:18)      SUBJECTIVE: Pt seen in AM at bedside. Intubated. Sedated on propofol and precedex gtt.    ROS:  DYSPNEA: Y / N	  NAUS/VOM: Y / N	  SECRETIONS: Y / N	  AGITATION: Y / N  Pain (Y/N):       http://geriatrictoolkit.[employer].edu/cog/painad.pdf    https://www.aci.health.nsw.gov.au/__data/assets/pdf_file/0018/784841/[place]_Pain_Scale_Final.pdf  -Provocation/Palliation:  -Quality/Quantity:  -Radiating:  -Severity:  -Timing/Frequency:  -Impact on ADLs:    OTHER REVIEW OF SYSTEMS:  UNABLE TO OBTAIN  due to:    PEx:  T(C): 39.5 (08-20-21 @ 13:00), Max: 39.5 (08-20-21 @ 13:00)  HR: 120 (08-20-21 @ 15:00) (86 - 122)  BP: 94/50 (08-20-21 @ 15:00) (81/52 - 108/56)  RR: 32 (08-20-21 @ 15:00) (9 - 32)  SpO2: 98% (08-20-21 @ 15:00) (81% - 100%)  Wt(kg): --    PHYSICAL EXAM:  GENERAL:  []Alert  []Oriented x   []Lethargic  []Cachexia  [x]Unarousable  []Verbal  [x]Non-Verbal  Behavioral:   []Anxiety  [x]Delirium []Agitation []Cooperative  HEENT:  []Normal   []Dry mouth   [x]ET Tube/Trach  []Oral lesions  PULMONARY:   []Clear []Tachypnea  []Audible excessive secretions   [x]Rhonchi        []Right []Left [x]Bilateral  []Crackles        []Right []Left []Bilateral  []Wheezing     []Right []Left []Bilateral  CARDIOVASCULAR:    [x]Regular []Irregular []Tachy  []Marito []Murmur []Other  GASTROINTESTINAL:  [x]Soft  [x]Distended   [x]+BS  [x]Non tender []Tender  []PEG [x]OGT/ NGT  Last BM: 8/19  GENITOURINARY:  [x]Normal [] Incontinent   []Oliguria/Anuria   []Estrada  MUSCULOSKELETAL:   []Normal   [x]Weakness  []Bed/Wheelchair bound []Edema  NEUROLOGIC:   []No focal deficits  []Cognitive impairment  []Dysphagia []Dysarthria []Paresis [x]Encephalopathic   SKIN:   [x]Normal   []Pressure ulcer(s)  []Rash      	     ALLERGIES: No Known Allergies      OPIATE NAÏVE (Y/N):    MEDICATIONS: REVIEWED  MEDICATIONS  (STANDING):  albuterol/ipratropium for Nebulization 3 milliLiter(s) Nebulizer every 6 hours  cefTRIAXone   IVPB 1000 milliGRAM(s) IV Intermittent every 24 hours  chlorhexidine 0.12% Liquid 15 milliLiter(s) Oral Mucosa every 12 hours  chlorhexidine 2% Cloths 1 Application(s) Topical <User Schedule>  dexMEDEtomidine Infusion 0.5 MICROgram(s)/kG/Hr (6.13 mL/Hr) IV Continuous <Continuous>  dextrose 40% Gel 15 Gram(s) Oral once  dextrose 5%. 1000 milliLiter(s) (50 mL/Hr) IV Continuous <Continuous>  dextrose 5%. 1000 milliLiter(s) (100 mL/Hr) IV Continuous <Continuous>  dextrose 50% Injectable 25 Gram(s) IV Push once  dextrose 50% Injectable 12.5 Gram(s) IV Push once  dextrose 50% Injectable 25 Gram(s) IV Push once  glucagon  Injectable 1 milliGRAM(s) IntraMuscular once  insulin lispro (ADMELOG) corrective regimen sliding scale   SubCutaneous every 6 hours  metroNIDAZOLE  IVPB 500 milliGRAM(s) IV Intermittent every 8 hours  norepinephrine Infusion 0.05 MICROgram(s)/kG/Min (5.1 mL/Hr) IV Continuous <Continuous>  pantoprazole Infusion 8 mG/Hr (10 mL/Hr) IV Continuous <Continuous>  propofol Infusion 10 MICROgram(s)/kG/Min (3.26 mL/Hr) IV Continuous <Continuous>    MEDICATIONS  (PRN):  sodium chloride 0.9% lock flush 10 milliLiter(s) IV Push every 1 hour PRN Pre/post blood products, medications, blood draw, and to maintain line patency      LABS: REVIEWED  CBC:                        8.9    9.62  )-----------( 241      ( 20 Aug 2021 06:14 )             28.0     CMP:    08-20    145  |  111<H>  |  45<H>  ----------------------------<  151<H>  3.8   |  19<L>  |  1.06    Ca    8.0<L>      20 Aug 2021 06:14  Phos  3.8     08-20  Mg     2.5     08-20    TPro  5.0<L>  /  Alb  2.3<L>  /  TBili  0.4  /  DBili  x   /  AST  379<H>  /  ALT  460<H>  /  AlkPhos  407<H>  08-20      IMAGING: REVIEWED    ADVANCED DIRECTIVES:            FULL CODE            DNR DNI            LIVING WILL            DPOA       HCP       MOLST    DECISION MAKER:   LEGAL SURROGATE:    PSYCHOSOCIAL-SPIRITUAL ASSESSMENT:       Reviewed       Care plan unchanged       Care plan adjusted as above	    GOALS OF CARE DISCUSSION       Palliative care info/counseling provided	           Family meeting       Advanced Directives addressed please see Advance Care Planning Note	           See previous Palliative Medicine Note       Documentation of GOC:    AGENCY CHOICE DISCUSSED:           Homecare        Hospice        [facility]        Other:    REFERRALS	        Palliative Med        Unit SW/Case Mgmt              Speech/Swallow       Patient/Family Support       Massage Therapy       Music Therapy       Pet Therapy       Hospice       Nutrition       Dietician       PT/OT RIHCARD LOUIS             MRN-[id number]    CC: pulmonary embolism    HPI:  80 y/o Lao M (hard of hearing) with PMHx CAD (s/p 2 stents), asthma/COPD (current smoker) who presents for several days of worsening shortness of breath. He reports that his symptoms are present to when he has an asthma/COPD exacerbation. He denies any prolonged periods of immobility, recent activity, chest pain, leg pain, non-compliance with his home inhalers. He does admit to seeing his legs swell occasionally, but they are not swollen currently. He does admit to some abdominal pain without nausea or vomiting. He is a current every day cigarette smoker but states he "quit yesterday". He denies any fevers, chills, cough with any sputum etc. Of note, he does admit to a 20lb weight loss over the past month.    SUBJECTIVE: Pt seen in AM at bedside. Intubated. Sedated on propofol and precedex gtt.    ROS:  DYSPNEA: Y / N	  NAUS/VOM: Y / N	  SECRETIONS: Y / N	  AGITATION: Y / N  PAINAD: 1    OTHER REVIEW OF SYSTEMS:  UNABLE TO OBTAIN  due to: sdeation/encephalopathy    PEx:  T(C): 39.5 (08-20-21 @ 13:00), Max: 39.5 (08-20-21 @ 13:00)  HR: 120 (08-20-21 @ 15:00) (86 - 122)  BP: 94/50 (08-20-21 @ 15:00) (81/52 - 108/56)  RR: 32 (08-20-21 @ 15:00) (9 - 32)  SpO2: 98% (08-20-21 @ 15:00) (81% - 100%)  Wt(kg): --    PHYSICAL EXAM:  GENERAL:  []Alert  []Oriented x   []Lethargic  []Cachexia  [x]Unarousable  []Verbal  [x]Non-Verbal  Behavioral:   []Anxiety  [x]Delirium []Agitation []Cooperative  HEENT:  []Normal   []Dry mouth   [x]ET Tube/Trach  []Oral lesions  PULMONARY:   []Clear []Tachypnea  []Audible excessive secretions   [x]Rhonchi        []Right []Left [x]Bilateral  []Crackles        []Right []Left []Bilateral  []Wheezing     []Right []Left []Bilateral  CARDIOVASCULAR:    [x]Regular []Irregular []Tachy  []Mairto []Murmur []Other  GASTROINTESTINAL:  [x]Soft  [x]Distended   [x]+BS  [x]Non tender []Tender  []PEG [x]OGT/ NGT  Last BM: 8/19  GENITOURINARY:  [x]Normal [] Incontinent   []Oliguria/Anuria   []Estrada  MUSCULOSKELETAL:   []Normal   [x]Weakness  []Bed/Wheelchair bound []Edema  NEUROLOGIC:   []No focal deficits  []Cognitive impairment  []Dysphagia []Dysarthria []Paresis [x]Encephalopathic   SKIN:   [x]Normal   []Pressure ulcer(s)  []Rash    ALLERGIES: No Known Allergies    MEDICATIONS: REVIEWED  MEDICATIONS  (STANDING):  albuterol/ipratropium for Nebulization 3 milliLiter(s) Nebulizer every 6 hours  cefTRIAXone   IVPB 1000 milliGRAM(s) IV Intermittent every 24 hours  chlorhexidine 0.12% Liquid 15 milliLiter(s) Oral Mucosa every 12 hours  chlorhexidine 2% Cloths 1 Application(s) Topical <User Schedule>  dexMEDEtomidine Infusion 0.5 MICROgram(s)/kG/Hr (6.13 mL/Hr) IV Continuous <Continuous>  dextrose 40% Gel 15 Gram(s) Oral once  dextrose 5%. 1000 milliLiter(s) (50 mL/Hr) IV Continuous <Continuous>  dextrose 5%. 1000 milliLiter(s) (100 mL/Hr) IV Continuous <Continuous>  dextrose 50% Injectable 25 Gram(s) IV Push once  dextrose 50% Injectable 12.5 Gram(s) IV Push once  dextrose 50% Injectable 25 Gram(s) IV Push once  glucagon  Injectable 1 milliGRAM(s) IntraMuscular once  insulin lispro (ADMELOG) corrective regimen sliding scale   SubCutaneous every 6 hours  metroNIDAZOLE  IVPB 500 milliGRAM(s) IV Intermittent every 8 hours  norepinephrine Infusion 0.05 MICROgram(s)/kG/Min (5.1 mL/Hr) IV Continuous <Continuous>  pantoprazole Infusion 8 mG/Hr (10 mL/Hr) IV Continuous <Continuous>  propofol Infusion 10 MICROgram(s)/kG/Min (3.26 mL/Hr) IV Continuous <Continuous>    MEDICATIONS  (PRN):  sodium chloride 0.9% lock flush 10 milliLiter(s) IV Push every 1 hour PRN Pre/post blood products, medications, blood draw, and to maintain line patency      LABS: REVIEWED  CBC:                        8.9    9.62  )-----------( 241      ( 20 Aug 2021 06:14 )             28.0     CMP:    08-20    145  |  111<H>  |  45<H>  ----------------------------<  151<H>  3.8   |  19<L>  |  1.06    Ca    8.0<L>      20 Aug 2021 06:14  Phos  3.8     08-20  Mg     2.5     08-20    TPro  5.0<L>  /  Alb  2.3<L>  /  TBili  0.4  /  DBili  x   /  AST  379<H>  /  ALT  460<H>  /  AlkPhos  407<H>  08-20      IMAGING: REVIEWED    ADVANCED DIRECTIVES:            FULL CODE

## 2021-08-20 NOTE — PROGRESS NOTE ADULT - PROBLEM SELECTOR PLAN 7
Noted on CTA chest for 3cm mass c/f nerve sheath tumor and 6mm RLL lung mass w/ signs bony mets to thoracic spine and multiple ribs. Per ICU consult note overnight, patient made aware of findings prior to intubation.  - will need to find a surrogate decision maker for further GOC discussion.

## 2021-08-20 NOTE — DIETITIAN INITIAL EVALUATION ADULT. - PROBLEM SELECTOR PLAN 4
Hemoglobin 8 on admission, unknown baseline. 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 No s/s of active bleeding.    -F/u iron studies, B12, folate, retic, haptoglobin    ADDENDUM: worsening anemia noted during rapid, pending transfusion (pt consented), heparin gtt held

## 2021-08-20 NOTE — PROGRESS NOTE ADULT - PROBLEM SELECTOR PLAN 6
Noted on CTA chest for 3cm mass c/f nerve sheath tumor and 6mm RLL lung mass w/ signs bony mets to thoracic spine and multiple ribs. Per ICU consult note overnight, patient made aware of findings prior to intubation.  - steroids/NSAIDs would be beneficial if there is pain but contraindicated given GIB, if there is bony pain later on can consider Zometa x1.

## 2021-08-20 NOTE — PROGRESS NOTE ADULT - SUBJECTIVE AND OBJECTIVE BOX
Patient is a 81y old  Male who presents with a chief complaint of pulmonary embolism (20 Aug 2021 14:49)      INTERVAL HPI/OVERNIGHT EVENTS:   No overnight events   Afebrile, hemodynamically stable     Subjective: Patient seen and examined at bedside. Failed CPAP trial with tachypnea to the 40s. Patient febrile in afternoon.    ICU Vital Signs Last 24 Hrs  T(C): 39.5 (20 Aug 2021 13:00), Max: 39.5 (20 Aug 2021 13:00)  T(F): 103.1 (20 Aug 2021 13:00), Max: 103.1 (20 Aug 2021 13:00)  HR: 120 (20 Aug 2021 15:00) (86 - 122)  BP: 94/50 (20 Aug 2021 15:00) (81/52 - 108/56)  BP(mean): 65 (20 Aug 2021 15:00) (60 - 79)  ABP: 90/90 (20 Aug 2021 06:00) (90/90 - 119/72)  ABP(mean): 90 (20 Aug 2021 06:00) (76 - 305)  RR: 32 (20 Aug 2021 15:00) (9 - 32)  SpO2: 98% (20 Aug 2021 15:00) (81% - 100%)    I&O's Summary    19 Aug 2021 07:01  -  20 Aug 2021 07:00  --------------------------------------------------------  IN: 1010.8 mL / OUT: 1655 mL / NET: -644.2 mL    20 Aug 2021 07:01  -  20 Aug 2021 16:16  --------------------------------------------------------  IN: 713.6 mL / OUT: 330 mL / NET: 383.6 mL      Mode: AC/ CMV (Assist Control/ Continuous Mandatory Ventilation)  RR (machine): 16  TV (machine): 450  FiO2: 35  PEEP: 5  ITime: 0.9  MAP: 9.4  PIP: 17      PHYSICAL EXAM:  GENERAL: patient intubated, sedated  EYES: EOMI, PERRLA, conjunctiva and sclera clear  NECK: Supple, No JVD, Normal thyroid  HEART: Regular rate and rhythm; No murmurs, rubs, or gallops  RESPIRATORY: CTA B/L, No W/R/R  ABDOMEN: Soft, tender, Nondistended; midline scar  NEUROLOGY: unable to assess, prior to intubation no focal deficits, moving all extremities  EXTREMITIES:  2+ Peripheral Pulses, clubbing of fingers, 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      LABS:                        8.9    9.62  )-----------( 241      ( 20 Aug 2021 06:14 )             28.0     08-20    145  |  111<H>  |  45<H>  ----------------------------<  151<H>  3.8   |  19<L>  |  1.06    Ca    8.0<L>      20 Aug 2021 06:14  Phos  3.8     08-20  Mg     2.5     08-20    TPro  5.0<L>  /  Alb  2.3<L>  /  TBili  0.4  /  DBili  x   /  AST  379<H>  /  ALT  460<H>  /  AlkPhos  407<H>  08-20    PT/INR - ( 19 Aug 2021 06:59 )   PT: 17.9 sec;   INR: 1.52          PTT - ( 19 Aug 2021 06:59 )  PTT:43.5 sec  Urinalysis Basic - ( 19 Aug 2021 22:03 )    Color: Yellow / Appearance: Clear / S.020 / pH: x  Gluc: x / Ketone: NEGATIVE  / Bili: Negative / Urobili: 0.2 E.U./dL   Blood: x / Protein: 30 mg/dL / Nitrite: NEGATIVE   Leuk Esterase: NEGATIVE / RBC: < 5 /HPF / WBC 5-10 /HPF   Sq Epi: x / Non Sq Epi: 0-5 /HPF / Bacteria: Present /HPF        Culture - Blood (collected 19 Aug 2021 21:19)  Source: .Blood Blood  Preliminary Report (20 Aug 2021 10:00):    No growth at 12 hours    Culture - Blood (collected 19 Aug 2021 21:19)  Source: .Blood Blood  Preliminary Report (20 Aug 2021 10:00):    No growth at 12 hours      CAPILLARY BLOOD GLUCOSE      POCT Blood Glucose.: 89 mg/dL (20 Aug 2021 11:05)  POCT Blood Glucose.: 152 mg/dL (20 Aug 2021 06:16)  POCT Blood Glucose.: 118 mg/dL (20 Aug 2021 00:00)  POCT Blood Glucose.: 100 mg/dL (19 Aug 2021 17:45)    ABG - ( 19 Aug 2021 12:44 )  pH, Arterial: 7.39  pH, Blood: x     /  pCO2: 36    /  pO2: 370   / HCO3: 22    / Base Excess: -2.6  /  SaO2: 99.8                Consultant(s) Notes Reviewed:  [x ] YES  [ ] NO    MEDICATIONS  (STANDING):  albuterol/ipratropium for Nebulization 3 milliLiter(s) Nebulizer every 6 hours  cefTRIAXone   IVPB 1000 milliGRAM(s) IV Intermittent every 24 hours  chlorhexidine 0.12% Liquid 15 milliLiter(s) Oral Mucosa every 12 hours  chlorhexidine 2% Cloths 1 Application(s) Topical <User Schedule>  dexMEDEtomidine Infusion 0.5 MICROgram(s)/kG/Hr (6.13 mL/Hr) IV Continuous <Continuous>  dextrose 40% Gel 15 Gram(s) Oral once  dextrose 5%. 1000 milliLiter(s) (50 mL/Hr) IV Continuous <Continuous>  dextrose 5%. 1000 milliLiter(s) (100 mL/Hr) IV Continuous <Continuous>  dextrose 50% Injectable 25 Gram(s) IV Push once  dextrose 50% Injectable 12.5 Gram(s) IV Push once  dextrose 50% Injectable 25 Gram(s) IV Push once  glucagon  Injectable 1 milliGRAM(s) IntraMuscular once  insulin lispro (ADMELOG) corrective regimen sliding scale   SubCutaneous every 6 hours  metroNIDAZOLE  IVPB 500 milliGRAM(s) IV Intermittent every 8 hours  norepinephrine Infusion 0.05 MICROgram(s)/kG/Min (5.1 mL/Hr) IV Continuous <Continuous>  pantoprazole Infusion 8 mG/Hr (10 mL/Hr) IV Continuous <Continuous>  propofol Infusion 10 MICROgram(s)/kG/Min (3.26 mL/Hr) IV Continuous <Continuous>    MEDICATIONS  (PRN):  sodium chloride 0.9% lock flush 10 milliLiter(s) IV Push every 1 hour PRN Pre/post blood products, medications, blood draw, and to maintain line patency      Care Discussed with Consultants/Other Providers [ x] YES  [ ] NO    RADIOLOGY & ADDITIONAL TESTS:

## 2021-08-20 NOTE — DIETITIAN INITIAL EVALUATION ADULT. - ENTERAL
At current rate of propofol, recommend starting Jevity 1.2 Cariln @ 29ml/hr x 24hrs plus 2 LPS (200kcal, 30g pro) via NG. Provides: 696ml TV, 1035kcal/1423kcal w/propofol, 69g pro, 562ml free H2O, 70% RDI, 1.41g/kg ABW pro. Recommend starting at 10mL/hr and advancing by [id number] to goal as tolerated. Additional free H2O per team. Monitor for s/s intolerance; maintain aspiration precautions at all times.

## 2021-08-20 NOTE — DIETITIAN INITIAL EVALUATION ADULT. - PROBLEM SELECTOR PLAN 2
ADDENDUM: increased work of breathing this AM requiring BiPAP, metabolic acidosis on ABG w/ elevated lactate level. concern for ischemic bowel. transferred to ICU for further evaluation.

## 2021-08-20 NOTE — DIETITIAN INITIAL EVALUATION ADULT. - PROBLEM SELECTOR PLAN 1
Patient with acute PE of RUL without evidence of RHS. 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 Hypoxic to 90% on RA, tachy to 112 on admission. No leg swelling at this time. PERC team activated in hospital, no indication for intervention at this time.   Plan:  -Continue heparin gtt and switch to Eliquis 10mg BID when appropriate   -F/u TTE to r/o RHS, though CTPE negative for RHS   -F/u PERC team recommendations    ADDENDUM: rapid in AM see ICU note and below

## 2021-08-20 NOTE — PROGRESS NOTE ADULT - PROBLEM SELECTOR PLAN 3
Noted for acute Hgb drop 8->6.3 in setting of active melena. s/p pRBC transfusion. NGT placed showing dark gastric contents. GI team consulted for c/f active UGIB.  - f/u CTA abdomen and plan for bedside EGD  - transfuse pRBC and further stabilize as per primary team.

## 2021-08-20 NOTE — PROGRESS NOTE ADULT - SUBJECTIVE AND OBJECTIVE BOX
GASTROENTEROLOGY PROGRESS NOTE  Patient seen and examined at bedside. Hgb stable at 8.9. s/p bedside EGD , notable for Pablo Class IIb ulcer, approximately 15 mm in size, with no active bleeding. Noted in the antrum.     ROS: intubated and sedated    PERTINENT REVIEW OF SYSTEMS:  CONSTITUTIONAL: No weakness, fevers or chills  HEENT: No visual changes; No vertigo or throat pain   GASTROINTESTINAL: As above.  NEUROLOGICAL: No numbness or weakness  SKIN: No itching, burning, rashes, or lesions     Allergies    No Known Allergies    Intolerances      MEDICATIONS:  MEDICATIONS  (STANDING):  albuterol/ipratropium for Nebulization 3 milliLiter(s) Nebulizer every 6 hours  chlorhexidine 0.12% Liquid 15 milliLiter(s) Oral Mucosa every 12 hours  chlorhexidine 2% Cloths 1 Application(s) Topical <User Schedule>  dextrose 40% Gel 15 Gram(s) Oral once  dextrose 5%. 1000 milliLiter(s) (50 mL/Hr) IV Continuous <Continuous>  dextrose 5%. 1000 milliLiter(s) (100 mL/Hr) IV Continuous <Continuous>  dextrose 50% Injectable 25 Gram(s) IV Push once  dextrose 50% Injectable 12.5 Gram(s) IV Push once  dextrose 50% Injectable 25 Gram(s) IV Push once  glucagon  Injectable 1 milliGRAM(s) IntraMuscular once  insulin lispro (ADMELOG) corrective regimen sliding scale   SubCutaneous every 6 hours  norepinephrine Infusion 0.05 MICROgram(s)/kG/Min (5.1 mL/Hr) IV Continuous <Continuous>  pantoprazole Infusion 8 mG/Hr (10 mL/Hr) IV Continuous <Continuous>  propofol Infusion 10 MICROgram(s)/kG/Min (3.26 mL/Hr) IV Continuous <Continuous>    MEDICATIONS  (PRN):  sodium chloride 0.9% lock flush 10 milliLiter(s) IV Push every 1 hour PRN Pre/post blood products, medications, blood draw, and to maintain line patency    Vital Signs Last 24 Hrs  T(C): 38.1 (20 Aug 2021 09:00), Max: 38.1 (19 Aug 2021 21:00)  T(F): 100.6 (20 Aug 2021 09:00), Max: 100.6 (19 Aug 2021 21:00)  HR: 109 (20 Aug 2021 09:05) (76 - 114)  BP: 106/61 (20 Aug 2021 08:00) (81/52 - 106/61)  BP(mean): 79 (20 Aug 2021 08:00) (60 - 79)  RR: 9 (20 Aug 2021 08:00) (9 - 24)  SpO2: 99% (20 Aug 2021 09:05) (93% - 100%)     @ 07:01  -   @ 07:00  --------------------------------------------------------  IN: 1010.8 mL / OUT: 1655 mL / NET: -644.2 mL     @ 07:01  -   @ 09:34  --------------------------------------------------------  IN: 50.5 mL / OUT: 35 mL / NET: 15.5 mL      PHYSICAL EXAM:    General: Well developed; well nourished male; lying in bed, intubated and sedated  HEENT: MMM, conjunctiva pale, sclera clear; sump in place with dark output  Gastrointestinal: Soft, non-tender non-distended; Normal bowel sounds; No rebound or guarding  Extremities: No clubbing, cyanosis or edema  Neurological: intubated and sedated, unable to assess mental status  Skin: Warm and dry. No obvious rash    LABS:                        8.9    9.62  )-----------( 241      ( 20 Aug 2021 06:14 )             28.0     08-20    145  |  111<H>  |  45<H>  ----------------------------<  151<H>  3.8   |  19<L>  |  1.06    Ca    8.0<L>      20 Aug 2021 06:14  Phos  3.8     08-20  Mg     2.5     08-20    TPro  5.0<L>  /  Alb  2.3<L>  /  TBili  0.4  /  DBili  x   /  AST  379<H>  /  ALT  460<H>  /  AlkPhos  407<H>  08-20    PT/INR - ( 19 Aug 2021 06:59 )   PT: 17.9 sec;   INR: 1.52          PTT - ( 19 Aug 2021 06:59 )  PTT:43.5 sec      Urinalysis Basic - ( 19 Aug 2021 22:03 )    Color: Yellow / Appearance: Clear / S.020 / pH: x  Gluc: x / Ketone: NEGATIVE  / Bili: Negative / Urobili: 0.2 E.U./dL   Blood: x / Protein: 30 mg/dL / Nitrite: NEGATIVE   Leuk Esterase: NEGATIVE / RBC: < 5 /HPF / WBC 5-10 /HPF   Sq Epi: x / Non Sq Epi: 0-5 /HPF / Bacteria: Present /HPF                RADIOLOGY & ADDITIONAL STUDIES:  Reviewed

## 2021-08-20 NOTE — PROGRESS NOTE ADULT - PROBLEM SELECTOR PLAN 5
Pt presenting w/ several days progressively worsening dyspnea. Noted on CTA chest for R segmental PE w/o clear signs of R heart strain.  - f/u TTE when medically stable  - hold AC in setting of active bleed, decision when to resume AC as per primary team.

## 2021-08-20 NOTE — DIETITIAN INITIAL EVALUATION ADULT. - PROBLEM SELECTOR PLAN 7
Patient was transferred to [facility] with agudelo catheter in place. Prior CT scan in 2007 shows enlarged prostate.   -Outpatient records show recent tamsulosin 0.4mg prescription  -Restarted tamsulosin 0.4mg   -Please attempt TOV

## 2021-08-20 NOTE — PROGRESS NOTE ADULT - ASSESSMENT
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 Palliative consulted for complex medical decision making in the setting of life-limiting illness.

## 2021-08-20 NOTE — DIETITIAN INITIAL EVALUATION ADULT. - OTHER INFO
82 yo/male with PMHx CAD, asthma, COPD, presented with SOB. Found to have acute PE w/o RHS. Course c/b rapid response after pt had increased work of breathing, requiring intubation. Had acute drop in in hemoglobin, c/f hemorrhagic shock- s/p EGD that revealed non-bleeding gastric ulcer. PE likely provoked by underlying lung malignancy. CT A/P showing enlarged prostate and extensive bony mets c/w metastatic disease to spine, ribs and sternum. Pt seen in room and discussed during MICU rounds. Unable to obtain nutrition hx at this time 2/2 vent and sedation. NKFA per chart. Pt remains intubated on VC/AC mode, sedated on propofol @ 14.7ml/hr (388kcal/day from lipids). MAP 74- on levophed for BP support. Also on pantoprazole gtt. Currently NPO w/sump to LIWS- dark/coffee-ground output in canister. No BM recorded overnight. Skin intact pressure-wise and noted with generalized trace edema. Temp of 99.5F overnight. Will continue to follow per RD protocol.

## 2021-08-20 NOTE — CONSULT NOTE ADULT - SUBJECTIVE AND OBJECTIVE BOX
Patient is a 81y old  Male who presents with a chief complaint of pulmonary embolism (19 Aug 2021 15:42)       HPI:  80 y/o Divehi M (hard of hearing) with PMHx CAD (s/p 2 stents), asthma/COPD (current smoker) who presents for several days of worsening shortness of breath. He reports that his symptoms are present to when he has an asthma/COPD exacerbation. He denies any prolonged periods of immobility, recent activity, chest pain, leg pain, non-compliance with his home inhalers. He does admit to seeing his legs swell occasionally, but they are not swollen currently. He does admit to some abdominal pain without nausea or vomiting. He is a current every day cigarette smoker but states he "quit yesterday". He denies any fevers, chills, cough with any sputum etc. Of note, he does admit to a 20lb weight loss over the past month.     ED Course:  Vitals: /69; P 112, R 24, T 98, O2 90% RA   Labs: Hg 8 (unknown baseline), albumin 2.5, pro-  Imaging: CTPE with acute PE in segmental branch of RUL without evidence of RHS. Also e/o 6mm nodule in RLL that is larger than 4mm nodular density in prior study. 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 Extensive sclerotic bony mets throughout spine, sternum and ribs.   Meds: heparin bolus, decadron 10mg IV, tylenol, DuoNebs   EKG: not done  (19 Aug 2021 03:18)      PAST MEDICAL & SURGICAL HISTORY:  COPD with asthma    CAD (coronary artery disease)    No significant past surgical history        MEDICATIONS  (STANDING):  albuterol/ipratropium for Nebulization 3 milliLiter(s) Nebulizer every 6 hours  chlorhexidine 0.12% Liquid 15 milliLiter(s) Oral Mucosa every 12 hours  chlorhexidine 2% Cloths 1 Application(s) Topical <User Schedule>  dextrose 40% Gel 15 Gram(s) Oral once  dextrose 5%. 1000 milliLiter(s) (50 mL/Hr) IV Continuous <Continuous>  dextrose 5%. 1000 milliLiter(s) (100 mL/Hr) IV Continuous <Continuous>  dextrose 50% Injectable 25 Gram(s) IV Push once  dextrose 50% Injectable 12.5 Gram(s) IV Push once  dextrose 50% Injectable 25 Gram(s) IV Push once  glucagon  Injectable 1 milliGRAM(s) IntraMuscular once  insulin lispro (ADMELOG) corrective regimen sliding scale   SubCutaneous every 6 hours  norepinephrine Infusion 0.05 MICROgram(s)/kG/Min (5.1 mL/Hr) IV Continuous <Continuous>  pantoprazole Infusion 8 mG/Hr (10 mL/Hr) IV Continuous <Continuous>  propofol Infusion 10 MICROgram(s)/kG/Min (3.26 mL/Hr) IV Continuous <Continuous>    MEDICATIONS  (PRN):  sodium chloride 0.9% lock flush 10 milliLiter(s) IV Push every 1 hour PRN Pre/post blood products, medications, blood draw, and to maintain line patency    FAMILY HISTORY:  No pertinent family history in first degree relatives        CBC Full  -  ( 20 Aug 2021 06:14 )  WBC Count : 9.62 K/uL  RBC Count : 3.11 M/uL  Hemoglobin : 8.9 g/dL  Hematocrit : 28.0 %  Platelet Count - Automated : 241 K/uL  Mean Cell Volume : 90.0 fl  Mean Cell Hemoglobin : 28.6 pg  Mean Cell Hemoglobin Concentration : 31.8 gm/dL  Auto Neutrophil # : 7.47 K/uL  Auto Lymphocyte # : 0.72 K/uL  Auto Monocyte # : 0.32 K/uL  Auto Eosinophil # : 0.00 K/uL  Auto Basophil # : 0.03 K/uL  Auto Neutrophil % : 77.7 %  Auto Lymphocyte % : 7.5 %  Auto Monocyte % : 3.3 %  Auto Eosinophil % : 0.0 %  Auto Basophil % : 0.3 %          145  |  111<H>  |  45<H>  ----------------------------<  151<H>  3.8   |  19<L>  |  1.06    Ca    8.0<L>      20 Aug 2021 06:14  Phos  3.8       Mg     2.5         TPro  5.0<L>  /  Alb  2.3<L>  /  TBili  0.4  /  DBili  x   /  AST  379<H>  /  ALT  460<H>  /  AlkPhos  407<H>        Urinalysis Basic - ( 19 Aug 2021 22:03 )    Color: Yellow / Appearance: Clear / S.020 / pH: x  Gluc: x / Ketone: NEGATIVE  / Bili: Negative / Urobili: 0.2 E.U./dL   Blood: x / Protein: 30 mg/dL / Nitrite: NEGATIVE   Leuk Esterase: NEGATIVE / RBC: < 5 /HPF / WBC 5-10 /HPF   Sq Epi: x / Non Sq Epi: 0-5 /HPF / Bacteria: Present /HPF          Radiology:    < from: Xray Chest 1 View-PORTABLE IMMEDIATE (Xray Chest 1 View-PORTABLE IMMEDIATE .) (21 @ 10:10) >  EXAM:  XR CHEST PORTABLE IMMED 1V                          PROCEDURE DATE:  2021          INTERPRETATION:  TECHNIQUE: Single portable view of the chest.    COMPARISON:  2021    CLINICAL HISTORY: endotracheal tube placement    FINDINGS:    Single frontal view of the chest demonstrates endotracheal tube tip is just above the aurelia. NG tube courses below the hemidiaphragm. Diffuse bilateral airspace disease, unchanged. The cardiomediastinal silhouette is normal. No acute osseous abnormalities. Overlying EKG leads and wires are noted    IMPRESSION: Endotracheal tube tip is just above the aurelia.              Vital Signs Last 24 Hrs  T(C): 38.1 (20 Aug 2021 09:00), Max: 38.1 (19 Aug 2021 21:00)  T(F): 100.6 (20 Aug 2021 09:00), Max: 100.6 (19 Aug 2021 21:00)  HR: 109 (20 Aug 2021 09:05) (76 - 114)  BP: 106/61 (20 Aug 2021 08:00) (81/52 - 106/61)  BP(mean): 79 (20 Aug 2021 08:00) (60 - 79)  RR: 9 (20 Aug 2021 08:00) (9 - 24)  SpO2: 99% (20 Aug 2021 09:05) (93% - 100%)        REVIEW OF SYSTEMS:    CONSTITUTIONAL: No fever, weight loss, or fatigue  EYES: No eye pain, visual disturbances, or discharge  ENMT:  No difficulty hearing, tinnitus, vertigo; No sinus or throat pain  NECK: No pain or stiffness  BREASTS: No pain, masses, or nipple discharge  RESPIRATORY:   shortness of breath , 1 week  CARDIOVASCULAR: No chest pain, palpitations, dizziness, or leg swelling  GASTROINTESTINAL: No abdominal or epigastric pain. No nausea, vomiting, or hematemesis; No diarrhea or constipation. No melena or hematochezia.  GENITOURINARY: No dysuria, frequency, hematuria, or incontinence  NEUROLOGICAL: No headaches, memory loss, loss of strength, numbness, or tremors  SKIN: No itching, burning, rashes, or lesions   LYMPH NODES: No enlarged glands  ENDOCRINE: No heat or cold intolerance; No hair loss  MUSCULOSKELETAL: No joint pain or swelling; No muscle, back, or extremity pain  PSYCHIATRIC: No depression, anxiety, mood swings, or difficulty sleeping  HEME/LYMPH: No easy bruising, or bleeding gums  ALLERGY AND IMMUNOLOGIC: No hives or eczema  VASCULAR: no swelling, erythema,   : no dysuria, hematuria, frequency        Physical Exam: in MICU, patient is intubated/ sedated        PM&R Impression:    1)  acute PE  2)  s/p intubation 2021    Recommendations/ Plan : hold rehab, to begin rehab post extubation and medically cleared per MICU

## 2021-08-20 NOTE — DIETITIAN INITIAL EVALUATION ADULT. - NAME AND PHONE
Samantha Gitlin, RD, CDN, [facility], [phone] or available on [employer]

## 2021-08-21 NOTE — PROGRESS NOTE ADULT - SUBJECTIVE AND OBJECTIVE BOX
INTERVAL HPI/OVERNIGHT EVENTS:    SUBJECTIVE: Patient seen and examined at bedside.     OBJECTIVE:    VITAL SIGNS:  ICU Vital Signs Last 24 Hrs  T(C): 38.1 (21 Aug 2021 11:00), Max: 38.2 (21 Aug 2021 05:00)  T(F): 100.6 (21 Aug 2021 11:00), Max: 100.8 (21 Aug 2021 05:00)  HR: 99 (21 Aug 2021 12:36) (87 - 125)  BP: 101/56 (21 Aug 2021 12:00) (89/53 - 132/72)  BP(mean): 74 (21 Aug 2021 12:00) (65 - 97)  ABP: 110/53 (21 Aug 2021 12:00) (70/37 - 125/55)  ABP(mean): 71 (21 Aug 2021 12:00) (49 - 102)  RR: 27 (21 Aug 2021 12:36) (24 - 32)  SpO2: 100% (21 Aug 2021 12:36) (91% - 100%)    Mode: AC/ CMV (Assist Control/ Continuous Mandatory Ventilation), RR (machine): 16, TV (machine): 450, FiO2: 50, PEEP: 5, ITime: 1, MAP: 9.5, PIP: 17    08 @ 07: @ 07:00  --------------------------------------------------------  IN: 4000.6 mL / OUT: 1164 mL / NET: 2836.6 mL     @ 07:  -   @ 13:24  --------------------------------------------------------  IN: 275.6 mL / OUT: 175 mL / NET: 100.6 mL      CAPILLARY BLOOD GLUCOSE      POCT Blood Glucose.: 110 mg/dL (21 Aug 2021 11:05)      PHYSICAL EXAM:    General: NAD  HEENT: NC/AT; PERRL, clear conjunctiva  Neck: supple  Respiratory: CTA b/l  Cardiovascular: +S1/S2; RRR  Abdomen: soft, NT/ND; +BS x4  Extremities: WWP, 2+ peripheral pulses b/l; no LE edema  Skin: normal color and turgor; no rash  Neurological:    MEDICATIONS:  MEDICATIONS  (STANDING):  albuterol/ipratropium for Nebulization 3 milliLiter(s) Nebulizer every 6 hours  chlorhexidine 0.12% Liquid 15 milliLiter(s) Oral Mucosa every 12 hours  chlorhexidine 2% Cloths 1 Application(s) Topical <User Schedule>  dexMEDEtomidine Infusion 0.5 MICROgram(s)/kG/Hr (6.13 mL/Hr) IV Continuous <Continuous>  dextrose 40% Gel 15 Gram(s) Oral once  dextrose 5%. 1000 milliLiter(s) (50 mL/Hr) IV Continuous <Continuous>  dextrose 5%. 1000 milliLiter(s) (100 mL/Hr) IV Continuous <Continuous>  dextrose 50% Injectable 25 Gram(s) IV Push once  dextrose 50% Injectable 12.5 Gram(s) IV Push once  dextrose 50% Injectable 25 Gram(s) IV Push once  glucagon  Injectable 1 milliGRAM(s) IntraMuscular once  insulin lispro (ADMELOG) corrective regimen sliding scale   SubCutaneous every 6 hours  norepinephrine Infusion 0.05 MICROgram(s)/kG/Min (2.3 mL/Hr) IV Continuous <Continuous>  pantoprazole  Injectable 40 milliGRAM(s) IV Push every 12 hours  pantoprazole Infusion 8 mG/Hr (10 mL/Hr) IV Continuous <Continuous>  piperacillin/tazobactam IVPB.. 3.375 Gram(s) IV Intermittent every 6 hours  propofol Infusion 10 MICROgram(s)/kG/Min (3.26 mL/Hr) IV Continuous <Continuous>  vancomycin  IVPB 1000 milliGRAM(s) IV Intermittent every 12 hours  vasopressin Infusion 0.03 Unit(s)/Min (1.8 mL/Hr) IV Continuous <Continuous>    MEDICATIONS  (PRN):  sodium chloride 0.9% lock flush 10 milliLiter(s) IV Push every 1 hour PRN Pre/post blood products, medications, blood draw, and to maintain line patency      ALLERGIES:  Allergies    No Known Allergies    Intolerances        LABS:                        9.1    15.97 )-----------( 190      ( 21 Aug 2021 09:15 )             28.7     08-21    146<H>  |  115<H>  |  31<H>  ----------------------------<  192<H>  5.1   |  18<L>  |  0.68    Ca    8.0<L>      21 Aug 2021 09:15  Phos  3.4       Mg     2.0         TPro  4.9<L>  /  Alb  2.1<L>  /  TBili  0.4  /  DBili  x   /  AST  132<H>  /  ALT  333<H>  /  AlkPhos  351<H>      PT/INR - ( 21 Aug 2021 09:15 )   PT: 20.7 sec;   INR: 1.77          PTT - ( 21 Aug 2021 09:15 )  PTT:29.0 sec  Urinalysis Basic - ( 19 Aug 2021 22:03 )    Color: Yellow / Appearance: Clear / S.020 / pH: x  Gluc: x / Ketone: NEGATIVE  / Bili: Negative / Urobili: 0.2 E.U./dL   Blood: x / Protein: 30 mg/dL / Nitrite: NEGATIVE   Leuk Esterase: NEGATIVE / RBC: < 5 /HPF / WBC 5-10 /HPF   Sq Epi: x / Non Sq Epi: 0-5 /HPF / Bacteria: Present /HPF        RADIOLOGY & ADDITIONAL TESTS: Reviewed. INTERVAL HPI/OVERNIGHT EVENTS: Hgb 8.1 > 7.5, 1x pRBCs administered    SUBJECTIVE: Patient seen and examined at bedside. Intubated and sedated.    OBJECTIVE:    VITAL SIGNS:  ICU Vital Signs Last 24 Hrs  T(C): 38.1 (21 Aug 2021 11:00), Max: 38.2 (21 Aug 2021 05:00)  T(F): 100.6 (21 Aug 2021 11:00), Max: 100.8 (21 Aug 2021 05:00)  HR: 99 (21 Aug 2021 12:36) (87 - 125)  BP: 101/56 (21 Aug 2021 12:00) (89/53 - 132/72)  BP(mean): 74 (21 Aug 2021 12:00) (65 - 97)  ABP: 110/53 (21 Aug 2021 12:00) (70/37 - 125/55)  ABP(mean): 71 (21 Aug 2021 12:00) (49 - 102)  RR: 27 (21 Aug 2021 12:36) (24 - 32)  SpO2: 100% (21 Aug 2021 12:36) (91% - 100%)    Mode: AC/ CMV (Assist Control/ Continuous Mandatory Ventilation), RR (machine): 16, TV (machine): 450, FiO2: 50, PEEP: 5, ITime: 1, MAP: 9.5, PIP: 17     @ 07:  -   @ 07:00  --------------------------------------------------------  IN: 4000.6 mL / OUT: 1164 mL / NET: 2836.6 mL     @ 07:  -   @ 13:24  --------------------------------------------------------  IN: 275.6 mL / OUT: 175 mL / NET: 100.6 mL      CAPILLARY BLOOD GLUCOSE      POCT Blood Glucose.: 110 mg/dL (21 Aug 2021 11:05)      PHYSICAL EXAM:    GENERAL: patient intubated, sedated  EYES: EOMI, PERRLA, conjunctiva and sclera clear  NECK: Supple, No JVD, Normal thyroid  HEART: Regular rate and rhythm; No murmurs, rubs, or gallops  RESPIRATORY: CTA B/L, No W/R/R  ABDOMEN: Soft, tender, Nondistended; midline scar  NEUROLOGY: unable to assess, prior to intubation no focal deficits, moving all extremities  EXTREMITIES:  +1 pulses palpable on b/l LE; b/l radial pulses present on doppler but not palpable; fingers cyanotic; 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 1000 milliLiter(s) (50 mL/Hr) IV Continuous <Continuous>  dextrose 5%. 1000 milliLiter(s) (100 mL/Hr) IV Continuous <Continuous>  dextrose 50% Injectable 25 Gram(s) IV Push once  dextrose 50% Injectable 12.5 Gram(s) IV Push once  dextrose 50% Injectable 25 Gram(s) IV Push once  glucagon  Injectable 1 milliGRAM(s) IntraMuscular once  insulin lispro (ADMELOG) corrective regimen sliding scale   SubCutaneous every 6 hours  norepinephrine Infusion 0.05 MICROgram(s)/kG/Min (2.3 mL/Hr) IV Continuous <Continuous>  pantoprazole  Injectable 40 milliGRAM(s) IV Push every 12 hours  pantoprazole Infusion 8 mG/Hr (10 mL/Hr) IV Continuous <Continuous>  piperacillin/tazobactam IVPB.. 3.375 Gram(s) IV Intermittent every 6 hours  propofol Infusion 10 MICROgram(s)/kG/Min (3.26 mL/Hr) IV Continuous <Continuous>  vancomycin  IVPB 1000 milliGRAM(s) IV Intermittent every 12 hours  vasopressin Infusion 0.03 Unit(s)/Min (1.8 mL/Hr) IV Continuous <Continuous>    MEDICATIONS  (PRN):  sodium chloride 0.9% lock flush 10 milliLiter(s) IV Push every 1 hour PRN Pre/post blood products, medications, blood draw, and to maintain line patency      ALLERGIES:  Allergies    No Known Allergies    Intolerances        LABS:                        9.1    15.97 )-----------( 190      ( 21 Aug 2021 09:15 )             28.7     08-    146<H>  |  115<H>  |  31<H>  ----------------------------<  192<H>  5.1   |  18<L>  |  0.68    Ca    8.0<L>      21 Aug 2021 09:15  Phos  3.4       Mg     2.0         TPro  4.9<L>  /  Alb  2.1<L>  /  TBili  0.4  /  DBili  x   /  AST  132<H>  /  ALT  333<H>  /  AlkPhos  351<H>      PT/INR - ( 21 Aug 2021 09:15 )   PT: 20.7 sec;   INR: 1.77          PTT - ( 21 Aug 2021 09:15 )  PTT:29.0 sec  Urinalysis Basic - ( 19 Aug 2021 22:03 )    Color: Yellow / Appearance: Clear / S.020 / pH: x  Gluc: x / Ketone: NEGATIVE  / Bili: Negative / Urobili: 0.2 E.U./dL   Blood: x / Protein: 30 mg/dL / Nitrite: NEGATIVE   Leuk Esterase: NEGATIVE / RBC: < 5 /HPF / WBC 5-10 /HPF   Sq Epi: x / Non Sq Epi: 0-5 /HPF / Bacteria: Present /HPF        RADIOLOGY & ADDITIONAL TESTS: Reviewed.

## 2021-08-21 NOTE — CONSULT NOTE ADULT - SUBJECTIVE AND OBJECTIVE BOX
Vascular Attending: Dr. Duran      HPI:  82 y/o [place] M (hard of hearing) with PMHx CAD (s/p 2 stents), asthma/COPD (current smoker) who presents for several days of worsening shortness of breath. He reports that his symptoms are present to when he has an asthma/COPD exacerbation. He denies any prolonged periods of immobility, recent activity, chest pain, leg pain, non-compliance with his home inhalers. He does admit to seeing his legs swell occasionally, but they are not swollen currently. He does admit to some abdominal pain without nausea or vomiting. He is a current every day cigarette smoker but states he "quit yesterday". He denies any fevers, chills, cough with any sputum etc. Of note, he does admit to a 20lb weight loss over the past month. Patient was found to have a PE and was started on herparin but bled from a GI ulcer and went into hemorrhagic shock. Patient is now intubated and sedated on pressors.    Vascular addendum: Vascular consulted as primary team could not find radial pulse via palpation and doppler. Patient was found to be on multiples drips including norepinephrine drip running at 0.65 mcg/kg/min. On arrival intubated patient was found to have cyanotic digits.        PAST MEDICAL & SURGICAL HISTORY:  COPD with asthma    CAD (coronary artery disease)    No significant past surgical history      MEDICATIONS  (STANDING):  albuterol/ipratropium for Nebulization 3 milliLiter(s) Nebulizer every 6 hours  chlorhexidine 0.12% Liquid 15 milliLiter(s) Oral Mucosa every 12 hours  chlorhexidine 2% Cloths 1 Application(s) Topical <User Schedule>  dexMEDEtomidine Infusion 0.5 MICROgram(s)/kG/Hr (6.13 mL/Hr) IV Continuous <Continuous>  dextrose 40% Gel 15 Gram(s) Oral once  dextrose 5%. 1000 milliLiter(s) (50 mL/Hr) IV Continuous <Continuous>  dextrose 5%. 1000 milliLiter(s) (100 mL/Hr) IV Continuous <Continuous>  dextrose 50% Injectable 25 Gram(s) IV Push once  dextrose 50% Injectable 12.5 Gram(s) IV Push once  dextrose 50% Injectable 25 Gram(s) IV Push once  glucagon  Injectable 1 milliGRAM(s) IntraMuscular once  insulin lispro (ADMELOG) corrective regimen sliding scale   SubCutaneous every 6 hours  norepinephrine Infusion 0.05 MICROgram(s)/kG/Min (2.3 mL/Hr) IV Continuous <Continuous>  pantoprazole  Injectable 40 milliGRAM(s) IV Push every 12 hours  piperacillin/tazobactam IVPB.. 3.375 Gram(s) IV Intermittent every 6 hours  propofol Infusion 10 MICROgram(s)/kG/Min (3.26 mL/Hr) IV Continuous <Continuous>  vasopressin Infusion 0.03 Unit(s)/Min (1.8 mL/Hr) IV Continuous <Continuous>    MEDICATIONS  (PRN):  sodium chloride 0.9% lock flush 10 milliLiter(s) IV Push every 1 hour PRN Pre/post blood products, medications, blood draw, and to maintain line patency      Allergies    No Known Allergies    Intolerances        SOCIAL HISTORY:    FAMILY HISTORY:  No pertinent family history in first degree relatives        Vital Signs Last 24 Hrs  T(C): 38.8 (21 Aug 2021 19:00), Max: 38.9 (21 Aug 2021 14:50)  T(F): 101.8 (21 Aug 2021 19:00), Max: 102 (21 Aug 2021 14:50)  HR: 100 (21 Aug 2021 21:00) (87 - 108)  BP: 101/56 (21 Aug 2021 21:00) (94/53 - 132/72)  BP(mean): 73 (21 Aug 2021 21:00) (69 - 97)  RR: 31 (21 Aug 2021 21:00) (24 - 35)  SpO2: 91% (21 Aug 2021 21:00) (91% - 100%)    PHYSICAL EXAM:  GENERAL: intubated, sedated, on multiple drips including norepinephrine running at 0.65 mcg/kg/min  CV: NSR  Resp: Intubated  ABDOMEN: Soft, nondistended  EXTREMITIES:  triphasic PT and DP b/l; monophasic radial on right; left A line in radial has reading; triphasic brachial b/l; fingers cyanotic; 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; capillary refill >3 seconds        LABS:                        8.7    12.50 )-----------( 166      ( 21 Aug 2021 20:56 )             27.4     08-21    146<H>  |  115<H>  |  31<H>  ----------------------------<  192<H>  5.1   |  18<L>  |  0.68    Ca    8.0<L>      21 Aug 2021 09:15  Phos  3.4       Mg     2.0         TPro  4.9<L>  /  Alb  2.1<L>  /  TBili  0.4  /  DBili  x   /  AST  132<H>  /  ALT  333<H>  /  AlkPhos  351<H>      PT/INR - ( 21 Aug 2021 09:15 )   PT: 20.7 sec;   INR: 1.77          PTT - ( 21 Aug 2021 09:15 )  PTT:29.0 sec  Urinalysis Basic - ( 19 Aug 2021 22:03 )    Color: Yellow / Appearance: Clear / S.020 / pH: x  Gluc: x / Ketone: NEGATIVE  / Bili: Negative / Urobili: 0.2 E.U./dL   Blood: x / Protein: 30 mg/dL / Nitrite: NEGATIVE   Leuk Esterase: NEGATIVE / RBC: < 5 /HPF / WBC 5-10 /HPF   Sq Epi: x / Non Sq Epi: 0-5 /HPF / Bacteria: Present /HPF        RADIOLOGY & ADDITIONAL STUDIES      Assessment and plan: 82 y/o [place] M (hard of hearing) with PMHx CAD (s/p 2 stents), asthma/COPD (current smoker) who presented with SOB 2/2 acute subsegmental PE admitted to MICU for acute hypoxic respiratory failure 2/2 hemorrhagic shock 2/2 GIB. Vascular was consulted for nonpalpable and non-dopperable radial pulses. On exam patient was found to have a dopplerable monophasic right radial and a left A line that was still reading a pulse. Fingers were cyanotic. From a vascular standpoint, no vascular intervention is needed at this time    - Wean Pressors  - No vascular intervention, service signing off.   - Please call with additional questions or concerns, [phone]

## 2021-08-21 NOTE — PROGRESS NOTE ADULT - ASSESSMENT
ASSESSMENT    80 y/o [place]ic M (hard of hearing) with PMHx CAD (s/p 2 stents), asthma/COPD (current smoker) who presented with SOB 2/2 acute subsegmental PE admitted to MICU for acute hypoxic respiratory failure 2/2 hemorrhagic shock 2/2 GIB now with FUO.    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   -No active issues, continue to monitor.    #Sedation  - c/w propofol  - c/w precedex    CARDIOVASCULAR  #CAD (coronary artery disease).  Patient with CAD, hx of 3 stents many years ago.   - AC held in setting of bleed    #Hemorrhagic shock 2/2 to UGIB/Septic shock/medication induced shock    Pt had large antral stomach ulcer that hemorrhaged s/p heparin gtt. Pt initially with downtrending levophed requirements from resolving bleed. However, on 8/20 levo requirements started increasing likely i/s/o of sepsis from possible intra-abdominal source vs. increased propofol   - Titrate levophed to sBP 90s   - Sepsis management as below     RESPIRATORY   #Acute hypoxic respiratory failure 2/2 to PE. Patient originally stable on 2L NC after being diagnosed with RUL PE. Started on heparin gtt. Patient now with increased work of breathing not responding to BiPAP. ABG 7.2/18/179.   - intubated, attempted CPAP trial on 8/20 with result of tachypnea, placed back on VC/AC  - ECHO: no pertinent findings, EF 60-65%    #Acute pulmonary embolism.  Patient with acute PE of RUL without evidence of RHS. 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 Hypoxic to 90% on RA, tachy to 112 on admission. No leg swelling at this time. PERC team activated in hospital, no indication for intervention at this time. LE dopplers negative.   - no intervention at present due to GI bleed   - continue to monitor    #Asthma with COPD.  Current every day smoker who presents with shortness of breath - denies any noncompliance with medications (albuterol PRN and symbicort). Unknown follow up, patient could not verbalize.   -CTPE shows evidence of advanced pulmonary emphysema with e/o airway disease, bronchial wall thickening and GGO in upper lobes   -S/p DuoNebs in ED   -Continue symbicort inpatient, and albuterol PRN inpatient   -Patient may need home oxygen (did not use any prior to admission) - continue to monitor.   -currently intubated    GI  #Gastric Ulcer  Patient with bleeding gastric ulcer found on EGD with GI. Bleed exacerbated by heparin drip started for treatment of PE causing patient to go into hemorrhagic shock. S/p endoscopy showing Pablo Class IIb ulcer, approximately 15 mm in size, with no active bleeding. Noted in the antrum.    - C/w protonix gtt x72 hours, ending on 8/21   - F/u GI recs, likely scope next week     ID  #Possible intra-abdominal infection  Pt spiking fever of 103 on 8/20 with no change in O2 requirements on vent, CXR with no consolidations, UA negative for infection. Given GI bleed possible translocation of bacteria from the GI tract  - Started CFTX and Flagyl to cover for intra-abdominal infection   - F/u bcx     RENAL   #Urinary retention  Patient arrived with agudelo catheter in place. Prior CT scan in 2007 shows enlarged prostate.   -Outpatient records show recent tamsulosin 0.4mg prescription  -Restarted tamsulosin 0.4mg     #enlarged prostate  Patient with enlarged prostate on CT findings in 2007 and this admission  - consider urology consult     HEMATOLOGY/ONCOLOGY  # Anemia   Hemoglobin 6.3 in AM due to bleeding GI ulcer, likely mixed picture with AOCD in setting of likely malignancy. S/p 2U on 8/19. Hgb staying stable in mid 8s to low 9s, RI showing hypoproliferative.   - Continue to monitor CBC q12h   - Ulcer management as above     #R/O Lung cancer vs prostate cancer metastatic to bone.  Patient with extensive smoking history who presents with acute PE, likely provoked from underlying malignancy. He recalls a 20 lb weight loss over the past 1 month, denies fever or chills.   -CTPE with evidence of 6mm nodule in RLL that is an increase from 4mm nodular density from study in 2017. Now with extensive bony mets c/w metastatic disease to spine, ribs and sternum.  -CT ab/pelvis shows  prostate is enlarged, measuring 7.0 x 5.1x 4.5 cm, giving calculated volume of 84 cc. The prostate is heterogeneous, with calcification centrally, consistent with hyperplasia  -Patient informed of likely cancer diagnosis, states that he is not surprised given his extensive smoking history   -Consult heme/onc  -Palliative care consulted, f/u recommendations     METABOLIC  # Metabolic acidosis. - RESOLVED  Lactate of 17 after patient decompensated on RMF. ABG 7.2/18/179  Likely due to acute respiratory decompensation. Patient with multiple etiologies including lung CA, PE, COPD. Additional etiology to consider mesenteric ischemia i/s/o Hb drop and profoundly elevated lactate.   -lactate downtrending currently 3.4    PROPHYLACTIC  FLUIDS: none at this time  ELECTROLYTES: Mg<2, K<4  DIET: NPO, to be intubated  GI PPX: Protonix while intubated  VTE PPX: SCD, started on heparin gtt for PE  CODE: FULL  DISPO: MICU ASSESSMENT    82 y/o Slovak M (hard of hearing) with PMHx CAD (s/p 2 stents), asthma/COPD (current smoker) who presented with SOB 2/2 acute subsegmental PE admitted to MICU for acute hypoxic respiratory failure 2/2 hemorrhagic shock 2/2 GIB now with FUO.    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   -No active issues, continue to monitor.    #Sedation  - wean propofol  - c/w precedex  - daily SATs and SBTs    CARDIOVASCULAR  #CAD (coronary artery disease).  Patient with CAD, hx of 3 stents many years ago.   - AC held in setting of bleed    #Hemorrhagic shock 2/2 to UGIB  Pt had large antral stomach ulcer that hemorrhaged s/p heparin gtt. Pt initially with downtrending levophed requirements from resolving bleed. However, on 8/20 levo requirements started increasing likely i/s/o of sepsis from possible intra-abdominal source vs. increased propofol   - Titrate levophed to sBP 90s   - d/c vasopressin  - Sepsis management as below     RESPIRATORY   #Acute hypoxic respiratory failure 2/2 to hemorrhagic shock   Patient originally stable on 2L NC after being diagnosed with RUL PE. Started on heparin gtt. Admitted to MICU with increased work of breathing not responding to BiPAP. ABG 7.2/18/179.   - intubated, attempted CPAP trial on 8/20 with result of tachypnea, placed back on VC/AC  - ECHO: no pertinent findings, EF 60-65%  - continue CPAP trials    #Acute pulmonary embolism.  Patient with acute PE of RUL without evidence of RHS. 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 Hypoxic to 90% on RA, tachy to 112 on admission. No leg swelling at this time. PERC team activated in hospital, no indication for intervention at this time. LE dopplers negative.   - no intervention at present due to GI bleed   - likely candidate for IVC when hemodynamically stable  - continue to monitor    #Asthma with COPD.  Current every day smoker who presents with shortness of breath - denies any noncompliance with medications (albuterol PRN and symbicort). Unknown follow up, patient could not verbalize.   -CTPE shows evidence of advanced pulmonary emphysema with e/o airway disease, bronchial wall thickening and GGO in upper lobes   -S/p DuoNebs in ED   -Continue symbicort inpatient, and albuterol PRN inpatient   -Patient may need home oxygen (did not use any prior to admission) - continue to monitor.   -currently intubated    GI  #Gastric Ulcer  Patient with bleeding gastric ulcer found on EGD with GI. Bleed exacerbated by heparin drip started for treatment of PE causing patient to go into hemorrhagic shock. S/p endoscopy showing Pablo Class IIb ulcer, approximately 15 mm in size, with no active bleeding. Noted in the antrum.    - protonix 40 q12  - F/u GI recs, likely scope next week     ID  #FUO likely 2/2 intra-abdominal infection  Pt spiking fever of 103 on 8/20 with no change in O2 requirements on vent, CXR with no consolidations, UA negative for infection. Given GI bleed possible translocation of bacteria from the GI tract  -Vanc and Zosyn to cover for intra-abdominal infection   - F/u bcx   - trend CBC, lactate, CMP    RENAL   #Urinary retention  Patient arrived with agudelo catheter in place. Prior CT scan in 2007 shows enlarged prostate.   -Outpatient records show recent tamsulosin 0.4mg prescription  -Restarted tamsulosin 0.4mg     #enlarged prostate  Patient with enlarged prostate on CT findings in 2007 and this admission  - consider urology consult     HEMATOLOGY/ONCOLOGY  # Anemia   Hemoglobin 6.3 in AM due to bleeding GI ulcer, likely mixed picture with AOCD in setting of likely malignancy. S/p 2U on 8/19. Hgb staying stable in mid 8s to low 9s, RI showing hypoproliferative.   - Continue to monitor CBC q12h   - Ulcer management as above   - s/p 1u PRBCs after Hgb of 7.5    #Cyanosis  b/l hands noted to be cyanotic on exam with pulses present on doppler but not palpable  - Vascular consulted, appreciate recs    #Lung cancer vs prostate cancer metastatic to bone  Patient with extensive smoking history who presents with acute PE, likely provoked from underlying malignancy. He recalls a 20 lb weight loss over the past 1 month, denies fever or chills.   -CTPE with evidence of 6mm nodule in RLL that is an increase from 4mm nodular density from study in 2017. Now with extensive bony mets c/w metastatic disease to spine, ribs and sternum.  -CT ab/pelvis shows  prostate is enlarged, measuring 7.0 x 5.1x 4.5 cm, giving calculated volume of 84 cc. The prostate is heterogeneous, with calcification centrally, consistent with hyperplasia  -Patient informed of likely cancer diagnosis, states that he is not surprised given his extensive smoking history   -Consult heme/onc  -Palliative care consulted, f/u recommendations     METABOLIC    # lactic acidosis 2/2 shock (septic vs hemorrhagic vs cardiogenic)  Patient with multiple etiologies including lung CA, PE, COPD. 2/4 SIRS criteria met.  - Lactate 17 > 3.4 > 2.5 > 4.2  - POCUS showed decreased LV function  - f/u EKG  - f/u trops  - f/u blood cx    PROPHYLACTIC  FLUIDS: none at this time  ELECTROLYTES: Mg<2, K<4  DIET: NPO  DVT ppx: SCDs  GI ppx: protonix 40 q12  Dispo: MICU  GI PPX: Protonix while intubated  VTE PPX: SCDs  CODE: FULL  DISPO: MICU ASSESSMENT    80 y/o Irish M (hard of hearing) with PMHx CAD (s/p 2 stents), asthma/COPD (current smoker) who presented with SOB 2/2 acute subsegmental PE admitted to MICU for acute hypoxic respiratory failure 2/2 hemorrhagic shock 2/2 GIB now with FUO.    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   -No active issues, continue to monitor.    #Sedation  - wean propofol  - c/w precedex  - daily SATs and SBTs    CARDIOVASCULAR  #CAD (coronary artery disease).  Patient with CAD, hx of 3 stents many years ago.   - AC held in setting of bleed    #Hemorrhagic shock 2/2 to UGIB  Pt had large antral stomach ulcer that hemorrhaged s/p heparin gtt. Pt initially with downtrending levophed requirements from resolving bleed. However, on 8/20 levo requirements started increasing likely i/s/o of sepsis from possible intra-abdominal source vs. increased propofol   - Titrate levophed to sBP 90s   - d/c vasopressin  - Sepsis management as below     RESPIRATORY   #Acute hypoxic respiratory failure 2/2 to hemorrhagic shock   Patient originally stable on 2L NC after being diagnosed with RUL PE. Started on heparin gtt. Admitted to MICU with increased work of breathing not responding to BiPAP. ABG 7.2/18/179.   - intubated, attempted CPAP trial on 8/20 with result of tachypnea, placed back on VC/AC  - ECHO: no pertinent findings, EF 60-65%  - continue CPAP trials    #Acute pulmonary embolism.  Patient with acute PE of RUL without evidence of RHS. 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 Hypoxic to 90% on RA, tachy to 112 on admission. No leg swelling at this time. PERC team activated in hospital, no indication for intervention at this time. LE dopplers negative.   - no intervention at present due to GI bleed   - likely candidate for IVC when hemodynamically stable  - continue to monitor    #Asthma with COPD.  Current every day smoker who presents with shortness of breath - denies any noncompliance with medications (albuterol PRN and symbicort). Unknown follow up, patient could not verbalize.   -CTPE shows evidence of advanced pulmonary emphysema with e/o airway disease, bronchial wall thickening and GGO in upper lobes   -S/p DuoNebs in ED   -Continue symbicort inpatient, and albuterol PRN inpatient   -Patient may need home oxygen (did not use any prior to admission) - continue to monitor.   -currently intubated    GI  #Gastric Ulcer  Patient with bleeding gastric ulcer found on EGD with GI. Bleed exacerbated by heparin drip started for treatment of PE causing patient to go into hemorrhagic shock. S/p endoscopy showing Pablo Class IIb ulcer, approximately 15 mm in size, with no active bleeding. Noted in the antrum.    - protonix 40 q12  - F/u GI recs, likely scope next week     ID  #Septic shock/FUO likely 2/2 intra-abdominal infection  Pt spiking fever of 103 on 8/20 with no change in O2 requirements on vent, CXR with no consolidations, UA negative for infection. Given GI bleed possible translocation of bacteria from the GI tract. 2/4 SIRS criteria met.  - Vanc and Zosyn to cover for intra-abdominal infection   - F/u bcx   - trend CBC, lactate, CMP    RENAL   #Urinary retention  Patient arrived with agudelo catheter in place. Prior CT scan in 2007 shows enlarged prostate.   -Outpatient records show recent tamsulosin 0.4mg prescription  -Restarted tamsulosin 0.4mg     #enlarged prostate  Patient with enlarged prostate on CT findings in 2007 and this admission  - consider urology consult     HEMATOLOGY/ONCOLOGY  # Anemia   Hemoglobin 6.3 in AM due to bleeding GI ulcer, likely mixed picture with AOCD in setting of likely malignancy. S/p 2U on 8/19. Hgb staying stable in mid 8s to low 9s, RI showing hypoproliferative.   - Continue to monitor CBC q12h   - Ulcer management as above   - s/p 1u PRBCs after Hgb of 7.5    #Cyanosis  b/l hands noted to be cyanotic on exam with pulses present on doppler but not palpable  - Vascular consulted, appreciate recs    #Lung cancer vs prostate cancer metastatic to bone  Patient with extensive smoking history who presents with acute PE, likely provoked from underlying malignancy. He recalls a 20 lb weight loss over the past 1 month, denies fever or chills.   -CTPE with evidence of 6mm nodule in RLL that is an increase from 4mm nodular density from study in 2017. Now with extensive bony mets c/w metastatic disease to spine, ribs and sternum.  -CT ab/pelvis shows  prostate is enlarged, measuring 7.0 x 5.1x 4.5 cm, giving calculated volume of 84 cc. The prostate is heterogeneous, with calcification centrally, consistent with hyperplasia  -Patient informed of likely cancer diagnosis, states that he is not surprised given his extensive smoking history   -Consult heme/onc  -Palliative care consulted, f/u recommendations     METABOLIC    # lactic acidosis 2/2 shock (septic vs hemorrhagic vs cardiogenic)  Patient with multiple etiologies including lung CA, PE, COPD  - Lactate 17 > 3.4 > 2.5 > 4.2  - POCUS showed decreased LV function  - f/u EKG  - f/u trops  - f/u blood cx    PROPHYLACTIC  FLUIDS: none at this time  ELECTROLYTES: Mg<2, K<4  DIET: NPO  DVT ppx: SCDs  GI ppx: protonix 40 q12  Dispo: MICU  GI PPX: Protonix while intubated  VTE PPX: SCDs  CODE: FULL  DISPO: MICU ASSESSMENT    80 y/o Uzbek M (hard of hearing) with PMHx CAD (s/p 2 stents), asthma/COPD (current smoker) who presented with SOB 2/2 acute subsegmental PE admitted to MICU for acute hypoxic respiratory failure 2/2 hemorrhagic shock 2/2 GIB now with FUO.    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   -No active issues, continue to monitor.    #Sedation  - wean propofol  - c/w precedex  - daily SATs and SBTs    CARDIOVASCULAR  #CAD (coronary artery disease).  Patient with CAD, hx of 3 stents many years ago.   - AC held in setting of bleed    #Hemorrhagic shock 2/2 to UGIB  Pt had large antral stomach ulcer that hemorrhaged s/p heparin gtt. Pt initially with downtrending levophed requirements from resolving bleed. However, on 8/20 levo requirements started increasing likely i/s/o of sepsis from possible intra-abdominal source vs. increased propofol   - Titrate levophed to sBP 90s   - d/c vasopressin  - Sepsis management as below     RESPIRATORY   #Acute hypoxic respiratory failure 2/2 to hemorrhagic shock   Patient originally stable on 2L NC after being diagnosed with RUL PE. Started on heparin gtt. Admitted to MICU with increased work of breathing not responding to BiPAP. ABG 7.2/18/179.   - intubated, attempted CPAP trial on 8/20 with result of tachypnea, placed back on VC/AC  - ECHO: no pertinent findings, EF 60-65%  - continue CPAP trials    #Acute pulmonary embolism.  Patient with acute PE of RUL without evidence of RHS. 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 Hypoxic to 90% on RA, tachy to 112 on admission. No leg swelling at this time. PERC team activated in hospital, no indication for intervention at this time. LE dopplers negative.   - no intervention at present due to GI bleed   - likely candidate for IVC when hemodynamically stable  - continue to monitor    #Asthma with COPD.  Current every day smoker who presents with shortness of breath - denies any noncompliance with medications (albuterol PRN and symbicort). Unknown follow up, patient could not verbalize.   -CTPE shows evidence of advanced pulmonary emphysema with e/o airway disease, bronchial wall thickening and GGO in upper lobes   -S/p DuoNebs in ED   -Continue symbicort inpatient, and albuterol PRN inpatient   -Patient may need home oxygen (did not use any prior to admission) - continue to monitor.   -currently intubated    GI  #Gastric Ulcer  Patient with bleeding gastric ulcer found on EGD with GI. Bleed exacerbated by heparin drip started for treatment of PE causing patient to go into hemorrhagic shock. S/p endoscopy showing Pablo Class IIb ulcer, approximately 15 mm in size, with no active bleeding. Noted in the antrum.    - protonix 40 q12  - F/u GI recs, likely scope next week     #NPO  -ISS and q6 fingersticks    ID  #Septic shock/FUO likely 2/2 intra-abdominal infection  Pt spiking fever of 103 on 8/20 with no change in O2 requirements on vent, CXR with no consolidations, UA negative for infection. Given GI bleed possible translocation of bacteria from the GI tract. 2/4 SIRS criteria met.  - Vanc and Zosyn to cover for intra-abdominal infection   - F/u bcx   - trend CBC, lactate, CMP    RENAL   #Urinary retention  Patient arrived with agudelo catheter in place. Prior CT scan in 2007 shows enlarged prostate.   -Outpatient records show recent tamsulosin 0.4mg prescription  -Restarted tamsulosin 0.4mg     #enlarged prostate  Patient with enlarged prostate on CT findings in 2007 and this admission  - consider urology consult     HEMATOLOGY/ONCOLOGY  # Anemia   Hemoglobin 6.3 in AM due to bleeding GI ulcer, likely mixed picture with AOCD in setting of likely malignancy. S/p 2U on 8/19. Hgb staying stable in mid 8s to low 9s, RI showing hypoproliferative.   - Continue to monitor CBC q12h   - Ulcer management as above   - s/p 1u PRBCs after Hgb of 7.5    #Cyanosis  b/l hands noted to be cyanotic on exam with pulses present on doppler but not palpable  - Vascular consulted, appreciate recs    #Lung cancer vs prostate cancer metastatic to bone  Patient with extensive smoking history who presents with acute PE, likely provoked from underlying malignancy. He recalls a 20 lb weight loss over the past 1 month, denies fever or chills.   -CTPE with evidence of 6mm nodule in RLL that is an increase from 4mm nodular density from study in 2017. Now with extensive bony mets c/w metastatic disease to spine, ribs and sternum.  -CT ab/pelvis shows  prostate is enlarged, measuring 7.0 x 5.1x 4.5 cm, giving calculated volume of 84 cc. The prostate is heterogeneous, with calcification centrally, consistent with hyperplasia  -Patient informed of likely cancer diagnosis, states that he is not surprised given his extensive smoking history   -Consult heme/onc  -Palliative care consulted, f/u recommendations     METABOLIC    # lactic acidosis 2/2 shock (septic vs hemorrhagic vs cardiogenic)  Patient with multiple etiologies including lung CA, PE, COPD  - Lactate 17 > 3.4 > 2.5 > 4.2  - POCUS showed decreased LV function  - f/u EKG  - f/u trops  - f/u blood cx    PROPHYLACTIC  FLUIDS: none at this time  ELECTROLYTES: Mg<2, K<4  DIET: NPO  DVT ppx: SCDs  GI ppx: protonix 40 q12  Dispo: MICU  GI PPX: Protonix while intubated  VTE PPX: SCDs  CODE: FULL  DISPO: MICU

## 2021-08-21 NOTE — PROGRESS NOTE ADULT - SUBJECTIVE AND OBJECTIVE BOX
GASTROENTEROLOGY PROGRESS NOTE  Patient seen and examined at bedside. No episodes of melena per RN. Hgb stable.     PERTINENT REVIEW OF SYSTEMS:  Unable to obtain given intubated and sedated.    Allergies    No Known Allergies    Intolerances      MEDICATIONS:  MEDICATIONS  (STANDING):  albuterol/ipratropium for Nebulization 3 milliLiter(s) Nebulizer every 6 hours  chlorhexidine 0.12% Liquid 15 milliLiter(s) Oral Mucosa every 12 hours  chlorhexidine 2% Cloths 1 Application(s) Topical <User Schedule>  dexMEDEtomidine Infusion 0.5 MICROgram(s)/kG/Hr (6.13 mL/Hr) IV Continuous <Continuous>  dextrose 40% Gel 15 Gram(s) Oral once  dextrose 5%. 1000 milliLiter(s) (50 mL/Hr) IV Continuous <Continuous>  dextrose 5%. 1000 milliLiter(s) (100 mL/Hr) IV Continuous <Continuous>  dextrose 50% Injectable 25 Gram(s) IV Push once  dextrose 50% Injectable 12.5 Gram(s) IV Push once  dextrose 50% Injectable 25 Gram(s) IV Push once  glucagon  Injectable 1 milliGRAM(s) IntraMuscular once  insulin lispro (ADMELOG) corrective regimen sliding scale   SubCutaneous every 6 hours  norepinephrine Infusion 0.05 MICROgram(s)/kG/Min (2.3 mL/Hr) IV Continuous <Continuous>  pantoprazole  Injectable 40 milliGRAM(s) IV Push every 12 hours  piperacillin/tazobactam IVPB.. 3.375 Gram(s) IV Intermittent every 6 hours  propofol Infusion 10 MICROgram(s)/kG/Min (3.26 mL/Hr) IV Continuous <Continuous>  vasopressin Infusion 0.03 Unit(s)/Min (1.8 mL/Hr) IV Continuous <Continuous>    MEDICATIONS  (PRN):  sodium chloride 0.9% lock flush 10 milliLiter(s) IV Push every 1 hour PRN Pre/post blood products, medications, blood draw, and to maintain line patency    Vital Signs Last 24 Hrs  T(C): 38.8 (21 Aug 2021 19:00), Max: 38.9 (21 Aug 2021 14:50)  T(F): 101.8 (21 Aug 2021 19:00), Max: 102 (21 Aug 2021 14:50)  HR: 99 (21 Aug 2021 20:00) (87 - 108)  BP: 101/57 (21 Aug 2021 20:00) (94/53 - 132/72)  BP(mean): 74 (21 Aug 2021 20:00) (69 - 97)  RR: 31 (21 Aug 2021 20:00) (24 - 35)  SpO2: 100% (21 Aug 2021 20:00) (91% - 100%)     @ 07:01  -   @ 07:00  --------------------------------------------------------  IN: 4000.6 mL / OUT: 1164 mL / NET: 2836.6 mL     @ 07:  -   @ 20:41  --------------------------------------------------------  IN: 573.5 mL / OUT: 465 mL / NET: 108.5 mL      PHYSICAL EXAM:    General: in no acute distress  HEENT: MMM, conjunctiva and sclera clear  Gastrointestinal: Soft non-tender non-distended; No rebound or guarding  Skin: Warm and dry. No obvious rash    LABS:                        9.1    15.97 )-----------( 190      ( 21 Aug 2021 09:15 )             28.7     08-    146<H>  |  115<H>  |  31<H>  ----------------------------<  192<H>  5.1   |  18<L>  |  0.68    Ca    8.0<L>      21 Aug 2021 09:15  Phos  3.4       Mg     2.0         TPro  4.9<L>  /  Alb  2.1<L>  /  TBili  0.4  /  DBili  x   /  AST  132<H>  /  ALT  333<H>  /  AlkPhos  351<H>  08    PT/INR - ( 21 Aug 2021 09:15 )   PT: 20.7 sec;   INR: 1.77          PTT - ( 21 Aug 2021 09:15 )  PTT:29.0 sec      Urinalysis Basic - ( 19 Aug 2021 22:03 )    Color: Yellow / Appearance: Clear / S.020 / pH: x  Gluc: x / Ketone: NEGATIVE  / Bili: Negative / Urobili: 0.2 E.U./dL   Blood: x / Protein: 30 mg/dL / Nitrite: NEGATIVE   Leuk Esterase: NEGATIVE / RBC: < 5 /HPF / WBC 5-10 /HPF   Sq Epi: x / Non Sq Epi: 0-5 /HPF / Bacteria: Present /HPF                Culture - Blood (collected 20 Aug 2021 16:06)  Source: .Blood Blood-Peripheral  Preliminary Report (21 Aug 2021 17:00):    No growth at 1 day.    Culture - Blood (collected 20 Aug 2021 16:06)  Source: .Blood Blood-Peripheral  Preliminary Report (21 Aug 2021 17:00):    No growth at 1 day.    Culture - Blood (collected 19 Aug 2021 21:19)  Source: .Blood Blood  Preliminary Report (20 Aug 2021 22:00):    No growth at 1 day.    Culture - Blood (collected 19 Aug 2021 21:19)  Source: .Blood Blood  Preliminary Report (20 Aug 2021 22:00):    No growth at 1 day.      RADIOLOGY & ADDITIONAL STUDIES:  Reviewed

## 2021-08-21 NOTE — PROGRESS NOTE ADULT - ASSESSMENT
80 y/o Uz[place] M (hard of hearing) with PMHx CAD (s/p 2 stents), asthma/COPD (current smoker) who presents for several days of worsening shortness of breath, CT PE notable for RUL segmental PE, HC c/b acute hypoxic respiratory failure requiring intubation and acute drop in H/H and uptrend in BUN/Cr c/f UGIB. GI consulted for consideration of endoscopic evaluation.     #Anemia  With acute drop in H/H from 8.0 to 6.3, noted this morning during RR. BUN/Cr ratio elevated, c/f UGIB. On ASA 81 mg daily at home for CAD, unknown if on NSAIDS or AC at home.   -CT A/P (8/18) - with no e/o mesenteric ischemia. Pyelonephritis b/l. Bladder wall thickening c/w cystitis. Heterogeneous prostate with areas of low-density, ?infection, Extensive blastic osseous lesions, c/w metastatic prostate cancer.Advanced atherosclerotic disease.  -s/p EGD (8/19) - notable for Pablo Class IIb ulcer, approximately 15 mm in size, with no active bleeding. Noted in the antrum.   -H/H stable overnight, pending trend in H/H, will consider repeat EGD early next week  -Reticulocyte index 1.96, c/w hypoproliferative process  -No utility with obtaining Fe studies as has already received pRBC transfusions  -Continue to closely monitor H/H  -Maintain active T&S  -Transfusion goal as per primary team  -2 large bore IVs    #Alk Phosphatase elevation  Alk Phos elevated on admission, unclear if hepatobiliary vs osseous in origin (likely the latter, in light osseous blastic lesions noted on CT imaging).  - GGT level wnl so ALP elevation not related to liver     Narayan Santoyo MD  PGY-5, Gastroenterology Fellow  pager: [phone]

## 2021-08-22 NOTE — PROGRESS NOTE ADULT - ASSESSMENT
ASSESSMENT    80 y/o Turk[place] M (hard of hearing) with PMHx CAD (s/p 2 stents), asthma/COPD (current smoker) who presented with SOB 2/2 acute subsegmental PE admitted to MICU for acute hypoxic respiratory failure 2/2 hemorrhagic shock 2/2 GIB now with FUO.     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   -No active issues, continue to monitor.    #Sedation  - wean propofol  - c/w precedex  - daily SATs and SBTs    CARDIOVASCULAR  #CAD (coronary artery disease).  Patient with CAD, hx of 3 stents many years ago.   - AC held in setting of bleed    #Hemorrhagic shock 2/2 to UGIB  Pt had large antral stomach ulcer that hemorrhaged s/p heparin gtt. Pt initially with downtrending levophed requirements from resolving bleed. However, on 8/20 levo requirements started increasing likely i/s/o of sepsis from possible intra-abdominal source vs. increased propofol   - Titrate levophed to sBP 90s   - d/c vasopressin  - Sepsis management as below     RESPIRATORY   #Acute hypoxic respiratory failure 2/2 to hemorrhagic shock   Patient originally stable on 2L NC after being diagnosed with RUL PE. Started on heparin gtt. Admitted to MICU with increased work of breathing not responding to BiPAP. ABG 7.2/18/179.   - intubated, attempted CPAP trial on 8/20 with result of tachypnea, placed back on VC/AC  - ECHO: no pertinent findings, EF 60-65%  - continue CPAP trials    #Acute pulmonary embolism.  Patient with acute PE of RUL without evidence of RHS. 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 Hypoxic to 90% on RA, tachy to 112 on admission. No leg swelling at this time. PERC team activated in hospital, no indication for intervention at this time. LE dopplers negative.   - no intervention at present due to GI bleed   - likely candidate for IVC when hemodynamically stable  - continue to monitor    #Asthma with COPD.  Current every day smoker who presents with shortness of breath - denies any noncompliance with medications (albuterol PRN and symbicort). Unknown follow up, patient could not verbalize.   -CTPE shows evidence of advanced pulmonary emphysema with e/o airway disease, bronchial wall thickening and GGO in upper lobes   -S/p DuoNebs in ED   -Continue symbicort inpatient, and albuterol PRN inpatient   -Patient may need home oxygen (did not use any prior to admission) - continue to monitor.   -currently intubated    GI  #Gastric Ulcer  Patient with bleeding gastric ulcer found on EGD with GI. Bleed exacerbated by heparin drip started for treatment of PE causing patient to go into hemorrhagic shock. S/p endoscopy showing Pablo Class IIb ulcer, approximately 15 mm in size, with no active bleeding. Noted in the antrum.    - protonix 40 q12  - F/u GI recs, likely scope next week     #NPO  -ISS and q6 fingersticks    ID  #Septic shock/FUO likely 2/2 intra-abdominal infection  Pt spiking fever of 103 on 8/20 with no change in O2 requirements on vent, CXR with no consolidations, UA negative for infection. Given GI bleed possible translocation of bacteria from the GI tract. 2/4 SIRS criteria met.  - Vanc and Zosyn to cover for intra-abdominal infection   - F/u bcx   - trend CBC, lactate, CMP    RENAL   #Urinary retention  Patient arrived with agudelo catheter in place. Prior CT scan in 2007 shows enlarged prostate.   -Outpatient records show recent tamsulosin 0.4mg prescription  -Restarted tamsulosin 0.4mg     #Enlarged prostate  Patient with enlarged prostate on CT findings in 2007 and this admission  - urology consulted  - c/w antibiotics  - consider MRI pelvis for eval of possible abscess     HEMATOLOGY/ONCOLOGY  # Anemia   Hemoglobin 6.3 in AM due to bleeding GI ulcer, likely mixed picture with AOCD in setting of likely malignancy. S/p 2U on 8/19. Hgb staying stable in mid 8s to low 9s, RI showing hypoproliferative.   - Continue to monitor CBC q12h   - Ulcer management as above   - s/p 1u PRBCs after Hgb of 7.5    #Cyanosis  b/l hands noted to be cyanotic on exam with pulses present on doppler but not palpable  - Vascular consulted, appreciate recs    #Lung cancer vs prostate cancer metastatic to bone  Patient with extensive smoking history who presents with acute PE, likely provoked from underlying malignancy. He recalls a 20 lb weight loss over the past 1 month, denies fever or chills.   -CTPE with evidence of 6mm nodule in RLL that is an increase from 4mm nodular density from study in 2017. Now with extensive bony mets c/w metastatic disease to spine, ribs and sternum.  -CT ab/pelvis shows  prostate is enlarged, measuring 7.0 x 5.1x 4.5 cm, giving calculated volume of 84 cc. The prostate is heterogeneous, with calcification centrally, consistent with hyperplasia  -Patient informed of likely cancer diagnosis, states that he is not surprised given his extensive smoking history   -Consult heme/onc  -Palliative care consulted, f/u recommendations     METABOLIC    # lactic acidosis 2/2 shock (septic vs hemorrhagic vs cardiogenic)  Patient with multiple etiologies including lung CA, PE, COPD  - Lactate 17 > 3.4 > 2.5 > 4.2  - POCUS showed decreased LV function  - f/u EKG  - f/u trops  - f/u blood cx    PROPHYLACTIC  FLUIDS: none at this time  ELECTROLYTES: Mg<2, K<4  DIET: NPO  DVT ppx: SCDs  GI ppx: protonix 40 q12  Dispo: MICU  GI PPX: Protonix while intubated  VTE PPX: SCDs  CODE: FULL  DISPO: MICU

## 2021-08-22 NOTE — PROGRESS NOTE ADULT - SUBJECTIVE AND OBJECTIVE BOX
GASTROENTEROLOGY PROGRESS NOTE  Patient seen and examined at bedside. No BM's per RN. Hgb stable.     PERTINENT REVIEW OF SYSTEMS:  Unable to obtain given intubated and sedated.     Allergies    No Known Allergies    Intolerances      MEDICATIONS:  MEDICATIONS  (STANDING):  chlorhexidine 0.12% Liquid 15 milliLiter(s) Oral Mucosa every 12 hours  chlorhexidine 2% Cloths 1 Application(s) Topical <User Schedule>  dextrose 40% Gel 15 Gram(s) Oral once  dextrose 5%. 1000 milliLiter(s) (50 mL/Hr) IV Continuous <Continuous>  dextrose 5%. 1000 milliLiter(s) (100 mL/Hr) IV Continuous <Continuous>  dextrose 50% Injectable 25 Gram(s) IV Push once  dextrose 50% Injectable 12.5 Gram(s) IV Push once  dextrose 50% Injectable 25 Gram(s) IV Push once  fentaNYL   Infusion 0.5 MICROgram(s)/kG/Hr (2.45 mL/Hr) IV Continuous <Continuous>  glucagon  Injectable 1 milliGRAM(s) IntraMuscular once  insulin lispro (ADMELOG) corrective regimen sliding scale   SubCutaneous every 6 hours  norepinephrine Infusion 0.05 MICROgram(s)/kG/Min (2.3 mL/Hr) IV Continuous <Continuous>  pantoprazole  Injectable 40 milliGRAM(s) IV Push every 12 hours  piperacillin/tazobactam IVPB.. 3.375 Gram(s) IV Intermittent every 6 hours  propofol Infusion 10 MICROgram(s)/kG/Min (3.26 mL/Hr) IV Continuous <Continuous>  vancomycin  IVPB 1250 milliGRAM(s) IV Intermittent every 12 hours  vasopressin Infusion 0.03 Unit(s)/Min (1.8 mL/Hr) IV Continuous <Continuous>    MEDICATIONS  (PRN):  sodium chloride 0.9% lock flush 10 milliLiter(s) IV Push every 1 hour PRN Pre/post blood products, medications, blood draw, and to maintain line patency    Vital Signs Last 24 Hrs  T(C): 38.8 (22 Aug 2021 09:35), Max: 39.1 (21 Aug 2021 22:01)  T(F): 101.8 (22 Aug 2021 09:35), Max: 102.4 (21 Aug 2021 22:01)  HR: 123 (22 Aug 2021 13:00) (96 - 125)  BP: 113/64 (22 Aug 2021 13:00) (91/57 - 126/62)  BP(mean): 83 (22 Aug 2021 13:00) (69 - 88)  RR: 14 (22 Aug 2021 13:00) (14 - 35)  SpO2: 93% (22 Aug 2021 13:00) (89% - 100%)    08-21 @ 07:01  -  08-22 @ 07:00  --------------------------------------------------------  IN: 979.5 mL / OUT: 1020 mL / NET: -40.5 mL    08-22 @ 07:01  -  08-22 @ 13:19  --------------------------------------------------------  IN: 1225.4 mL / OUT: 305 mL / NET: 920.4 mL      PHYSICAL EXAM:    General: in no acute distress  HEENT: MMM, conjunctiva and sclera clear  Gastrointestinal: Soft non-tender non-distended; No rebound or guarding  Skin: Warm and dry. No obvious rash    LABS:                        8.7    11.98 )-----------( 153      ( 22 Aug 2021 04:28 )             27.5     08-22    146<H>  |  114<H>  |  26<H>  ----------------------------<  113<H>  3.9   |  21<L>  |  0.62    Ca    8.2<L>      22 Aug 2021 04:28  Phos  2.9     08-22  Mg     1.9     08-22    TPro  5.2<L>  /  Alb  2.1<L>  /  TBili  0.5  /  DBili  x   /  AST  79<H>  /  ALT  250<H>  /  AlkPhos  289<H>  08-22    PT/INR - ( 21 Aug 2021 09:15 )   PT: 20.7 sec;   INR: 1.77          PTT - ( 21 Aug 2021 09:15 )  PTT:29.0 sec                  Culture - Blood (collected 20 Aug 2021 16:06)  Source: .Blood Blood-Peripheral  Preliminary Report (21 Aug 2021 17:00):    No growth at 1 day.    Culture - Blood (collected 20 Aug 2021 16:06)  Source: .Blood Blood-Peripheral  Preliminary Report (21 Aug 2021 17:00):    No growth at 1 day.    Culture - Blood (collected 19 Aug 2021 21:19)  Source: .Blood Blood  Preliminary Report (21 Aug 2021 22:00):    No growth at 2 days.    Culture - Blood (collected 19 Aug 2021 21:19)  Source: .Blood Blood  Preliminary Report (21 Aug 2021 22:00):    No growth at 2 days.      RADIOLOGY & ADDITIONAL STUDIES:  Reviewed

## 2021-08-22 NOTE — PROGRESS NOTE ADULT - ASSESSMENT
82 y/o Armenian M (hard of hearing) with PMHx CAD (s/p 2 stents), asthma/COPD (current smoker) who presents for several days of worsening shortness of breath, CT PE notable for RUL segmental PE, HC c/b acute hypoxic respiratory failure requiring intubation and acute drop in H/H and uptrend in BUN/Cr c/f UGIB. GI consulted for consideration of endoscopic evaluation.     #Anemia  With acute drop in H/H from 8.0 to 6.3, noted this morning during RR. BUN/Cr ratio elevated, c/f UGIB. On ASA 81 mg daily at home for CAD, unknown if on NSAIDS or AC at home.   -CT A/P (8/18) - with no e/o mesenteric ischemia. Pyelonephritis b/l. Bladder wall thickening c/w cystitis. Heterogeneous prostate with areas of low-density, ?infection, Extensive blastic osseous lesions, c/w metastatic prostate cancer.Advanced atherosclerotic disease.  -s/p EGD (8/19) - notable for Pablo Class IIb ulcer, approximately 15 mm in size, with no active bleeding. Noted in the antrum.   -Reticulocyte index 1.96, c/w hypoproliferative process  -No utility with obtaining Fe studies as has already received pRBC transfusions  -Continue to closely monitor H/H  -Maintain active T&S  -Transfusion goal as per primary team  -2 large bore IVs  - given Hgb stable and no episodes of melena and overall clinical condition, would defer further endoscopic evaluation     #Alk Phosphatase elevation  Alk Phos elevated on admission, unclear if hepatobiliary vs osseous in origin (likely the latter, in light osseous blastic lesions noted on CT imaging).  - GGT level wnl so ALP elevation not related to liver     Thank you for allowing us to participate in the care of this patient.  GI will sign off. Please call back with any questions or concerns.     Narayan Santoyo MD  PGY-5, Gastroenterology Fellow  pager: [phone]

## 2021-08-22 NOTE — CONSULT NOTE ADULT - ASSESSMENT
80 yo male currently intubated and in the ICU with sepsis. Concerns for prostate abscess and prostatitis. To better evaluate the prostate an MRI would is needed.

## 2021-08-22 NOTE — CONSULT NOTE ADULT - SUBJECTIVE AND OBJECTIVE BOX
This is an 82 y/o Italian M (hard of hearing) with PMHx CAD (s/p 2 stents), asthma/COPD (current smoker) who presented to the Emergency Room  after several days of worsening shortness of breath. He stated that his symptoms are similar to when he has an asthma/COPD exacerbation. He denied any prolonged periods of immobility, recent activity, chest pain, leg pain, non-compliance with his home inhalers, as well as fever, chills or a  productive cough.   He admitted to seeing his legs swell occasionally, but were not swollen on admission, and  to some abdominal pain without nausea or vomiting. He is a current every day cigarette smoker but stated he quit the day prior to admission.  He did also admit to a 20lb weight loss over the past month.   On admission a he was tachycardic and hypoxic. A CT Chest was done and he was found to have a RLL nodule and a pulmonary embolism. A few hours later a rapid response was called, the patient had become extremely anxious and more hypoxic than earlier- started on Bi-pap but eventually intubated.  Today he remains intubated, abg done yesterday (8/21/21)  ABG -pH, Arterial: 7.35  pH, Blood: x     /  pCO2: 33    /  pO2: 100   / HCO3: 18    / Base Excess: -6.4  /  SaO2: 98.6 .    Urology called to evaluate for questionable prostate abscess/ possible prostatitis, now septic.  Patient has a history of an enlarged prostate from a CT done back in 2007, and outpatient record shows a Flomax prescription.    PAST MEDICAL & SURGICAL HISTORY:  COPD with asthma  CAD (coronary artery disease)  Enlarged Prostate    Home Medications:  aspirin 81 mg oral delayed release tablet: 1 tab(s) orally once a day (19 Aug 2021 06:01)  atorvastatin 80 mg oral tablet: 1 tab(s) orally once a day (19 Aug 2021 06:01)  Symbicort 160 mcg-4.5 mcg/inh inhalation aerosol: 2 puff(s) inhaled 2 times a day (19 Aug 2021 06:01)  tamsulosin 0.4 mg oral capsule: 1 cap(s) orally once a day (19 Aug 2021 06:01)  Ventolin HFA 90 mcg/inh inhalation aerosol: 2 puff(s) inhaled every 6 hours, As Needed (19 Aug 2021 06:01)    MEDICATIONS  (STANDING):  chlorhexidine 0.12% Liquid 15 milliLiter(s) Oral Mucosa every 12 hours  chlorhexidine 2% Cloths 1 Application(s) Topical <User Schedule>  fentaNYL   Infusion 0.5 MICROgram(s)/kG/Hr (2.45 mL/Hr) IV Continuous <Continuous>  glucagon  Injectable 1 milliGRAM(s) IntraMuscular once  insulin lispro (ADMELOG) corrective regimen sliding scale   SubCutaneous every 6 hours  norepinephrine Infusion 0.05 MICROgram(s)/kG/Min (2.3 mL/Hr) IV Continuous <Continuous>  pantoprazole  Injectable 40 milliGRAM(s) IV Push every 12 hours  piperacillin/tazobactam IVPB.. 3.375 Gram(s) IV Intermittent every 6 hours  propofol Infusion 10 MICROgram(s)/kG/Min (3.26 mL/Hr) IV Continuous <Continuous>  vancomycin  IVPB 1250 milliGRAM(s) IV Intermittent every 12 hours  vasopressin Infusion 0.03 Unit(s)/Min (1.8 mL/Hr) IV Continuous <Continuous>    MEDICATIONS  (PRN):  acetaminophen  IVPB .. 750 milliGRAM(s) IV Intermittent once PRN Temp greater or equal to 38C (100.4F), Mild Pain (1 - 3)    ICU Vital Signs Last 24 Hrs  T(C): 39.1 (22 Aug 2021 14:31), Max: 39.1 (21 Aug 2021 22:01)  T(F): 102.4 (22 Aug 2021 14:31), Max: 102.4 (21 Aug 2021 22:01)  HR: 128 (22 Aug 2021 14:00) (96 - 128)  BP: 109/64 (22 Aug 2021 14:00) (91/57 - 126/62)  BP(mean): 81 (22 Aug 2021 14:00) (69 - 88)  ABP: 115/60 (22 Aug 2021 11:00) (103/88 - 141/54)  ABP(mean): 75 (22 Aug 2021 11:00) (64 - 93)  RR: 26 (22 Aug 2021 14:00) (14 - 35)  SpO2: 98% (22 Aug 2021 14:00) (89% - 100%)                          8.7    11.98 )-----------( 153      ( 22 Aug 2021 04:28 )             27.5   08-22    146<H>  |  114<H>  |  26<H>  ----------------------------<  113<H>  3.9   |  21<L>  |  0.62    Ca    8.2<L>      22 Aug 2021 04:28  Phos  2.9     08-22  Mg     1.9     08-22    TPro  5.2<L>  /  Alb  2.1<L>  /  TBili  0.5  /  DBili  x   /  AST  79<H>  /  ALT  250<H>  /  AlkPhos  289<H>  08-22      CT Abdomen and Pelvis w/ IV Cont (08.19.21 @ 12:24) >  Kidneys: There are several wedge-shaped foci of hypoenhancement extending to the cortices of the kidneys bilaterally, suspicious for pyelonephritis.    Pelvic organs: The prostate is enlarged, measuring 7.0 x 5.1x 4.5 cm, giving calculated volume of 84 cc. The prostate is heterogeneous, with calcification centrally, consistent with hyperplasia. There are also several foci of low-density within the prostate. Estrada catheter in place. There is wall thickening of the urinary bladder. Small right-sided bladder diverticulum. Bilateral scrotal hydroceles.  Impression:  1. No evidence of mesenteric ischemia.    2. Pyelonephritis bilaterally. Bladder wall thickening consistent with cystitis. Heterogeneous prostate with areas of low-density may also represent infection, i.e. developing abscesses.    3. Extensive blastic osseous lesions, consistent with metastatic prostate cancer.    4. Advanced atherosclerotic disease, as described above.    --- End of Report ---                     This is an 80 y/o [place] M (hard of hearing) with PMHx CAD (s/p 2 stents), asthma/COPD (current smoker) who presented to the Emergency Room  after several days of worsening shortness of breath. He stated that his symptoms are similar to when he has an asthma/COPD exacerbation. He denied any prolonged periods of immobility, recent activity, chest pain, leg pain, non-compliance with his home inhalers, as well as fever, chills or a  productive cough.   He admitted to seeing his legs swell occasionally, but were not swollen on admission, and  to some abdominal pain without nausea or vomiting. He is a current every day cigarette smoker but stated he quit the day prior to admission.  He did also admit to a 20lb weight loss over the past month.   On admission a he was tachycardic and hypoxic. A CT Chest was done and he was found to have a RLL nodule and a pulmonary embolism. A few hours later a rapid response was called, the patient had become extremely anxious and more hypoxic than earlier- started on Bi-pap but eventually intubated.  Today he remains intubated, abg done yesterday (8/21/21)  ABG -pH, Arterial: 7.35  pH, Blood: x     /  pCO2: 33    /  pO2: 100   / HCO3: 18    / Base Excess: -6.4  /  SaO2: 98.6 .    Urology called to evaluate for questionable prostate abscess/ possible prostatitis, now septic.  Patient has a history of an enlarged prostate from a CT done back in 2007, and outpatient record shows a Flomax prescription.    PAST MEDICAL & SURGICAL HISTORY:  COPD with asthma  CAD (coronary artery disease)  Enlarged Prostate    Home Medications:  aspirin 81 mg oral delayed release tablet: 1 tab(s) orally once a day (19 Aug 2021 06:01)  atorvastatin 80 mg oral tablet: 1 tab(s) orally once a day (19 Aug 2021 06:01)  Symbicort 160 mcg-4.5 mcg/inh inhalation aerosol: 2 puff(s) inhaled 2 times a day (19 Aug 2021 06:01)  tamsulosin 0.4 mg oral capsule: 1 cap(s) orally once a day (19 Aug 2021 06:01)  Ventolin HFA 90 mcg/inh inhalation aerosol: 2 puff(s) inhaled every 6 hours, As Needed (19 Aug 2021 06:01)    MEDICATIONS  (STANDING):  chlorhexidine 0.12% Liquid 15 milliLiter(s) Oral Mucosa every 12 hours  chlorhexidine 2% Cloths 1 Application(s) Topical <User Schedule>  fentaNYL   Infusion 0.5 MICROgram(s)/kG/Hr (2.45 mL/Hr) IV Continuous <Continuous>  glucagon  Injectable 1 milliGRAM(s) IntraMuscular once  insulin lispro (ADMELOG) corrective regimen sliding scale   SubCutaneous every 6 hours  norepinephrine Infusion 0.05 MICROgram(s)/kG/Min (2.3 mL/Hr) IV Continuous <Continuous>  pantoprazole  Injectable 40 milliGRAM(s) IV Push every 12 hours  piperacillin/tazobactam IVPB.. 3.375 Gram(s) IV Intermittent every 6 hours  propofol Infusion 10 MICROgram(s)/kG/Min (3.26 mL/Hr) IV Continuous <Continuous>  vancomycin  IVPB 1250 milliGRAM(s) IV Intermittent every 12 hours  vasopressin Infusion 0.03 Unit(s)/Min (1.8 mL/Hr) IV Continuous <Continuous>    MEDICATIONS  (PRN):  acetaminophen  IVPB .. 750 milliGRAM(s) IV Intermittent once PRN Temp greater or equal to 38C (100.4F), Mild Pain (1 - 3)    ICU Vital Signs Last 24 Hrs  T(C): 39.1 (22 Aug 2021 14:31), Max: 39.1 (21 Aug 2021 22:01)  T(F): 102.4 (22 Aug 2021 14:31), Max: 102.4 (21 Aug 2021 22:01)  HR: 128 (22 Aug 2021 14:00) (96 - 128)  BP: 109/64 (22 Aug 2021 14:00) (91/57 - 126/62)  BP(mean): 81 (22 Aug 2021 14:00) (69 - 88)  ABP: 115/60 (22 Aug 2021 11:00) (103/88 - 141/54)  ABP(mean): 75 (22 Aug 2021 11:00) (64 - 93)  RR: 26 (22 Aug 2021 14:00) (14 - 35)  SpO2: 98% (22 Aug 2021 14:00) (89% - 100%)    Physical Exam:  General: WN WD Intubated     : Non nodular, non boggy, non fluctuant prostate                        8.7   11.98 )-----------( 153      ( 22 Aug 2021 04:28 )             27.5   08-22    146<H>  |  114<H>  |  26<H>  ----------------------------<  113<H>  3.9   |  21<L>  |  0.62    Ca    8.2<L>      22 Aug 2021 04:28  Phos  2.9     08-22  Mg     1.9     08-22    TPro  5.2<L>  /  Alb  2.1<L>  /  TBili  0.5  /  DBili  x   /  AST  79<H>  /  ALT  250<H>  /  AlkPhos  289<H>  08-22      CT Abdomen and Pelvis w/ IV Cont (08.19.21 @ 12:24) >  Kidneys: There are several wedge-shaped foci of hypoenhancement extending to the cortices of the kidneys bilaterally, suspicious for pyelonephritis.    Pelvic organs: The prostate is enlarged, measuring 7.0 x 5.1x 4.5 cm, giving calculated volume of 84 cc. The prostate is heterogeneous, with calcification centrally, consistent with hyperplasia. There are also several foci of low-density within the prostate. Estrada catheter in place. There is wall thickening of the urinary bladder. Small right-sided bladder diverticulum. Bilateral scrotal hydroceles.  Impression:  1. No evidence of mesenteric ischemia.    2. Pyelonephritis bilaterally. Bladder wall thickening consistent with cystitis. Heterogeneous prostate with areas of low-density may also represent infection, i.e. developing abscesses.    3. Extensive blastic osseous lesions, consistent with metastatic prostate cancer.    4. Advanced atherosclerotic disease, as described above.    --- End of Report ---

## 2021-08-22 NOTE — PROGRESS NOTE ADULT - SUBJECTIVE AND OBJECTIVE BOX
Patient is a 81y old  Male who presents with a chief complaint of pulmonary embolism (22 Aug 2021 14:09)      INTERVAL HPI/OVERNIGHT EVENTS:   No overnight events   fevers overnight to 102.4    Subjective: Patient seen and examined at bedside. intubated and sedated     ICU Vital Signs Last 24 Hrs  T(C): 39 (22 Aug 2021 19:00), Max: 39.3 (22 Aug 2021 18:00)  T(F): 102.2 (22 Aug 2021 19:00), Max: 102.7 (22 Aug 2021 18:00)  HR: 130 (22 Aug 2021 19:00) (96 - 133)  BP: 95/52 (22 Aug 2021 19:00) (91/57 - 126/62)  BP(mean): 68 (22 Aug 2021 19:00) (68 - 88)  ABP: 115/60 (22 Aug 2021 11:00) (103/88 - 141/54)  ABP(mean): 75 (22 Aug 2021 11:00) (64 - 93)  RR: 18 (22 Aug 2021 19:00) (14 - 35)  SpO2: 93% (22 Aug 2021 19:00) (89% - 100%)    I&O's Summary    21 Aug 2021 07:01  -  22 Aug 2021 07:00  --------------------------------------------------------  IN: 979.5 mL / OUT: 1020 mL / NET: -40.5 mL    22 Aug 2021 07:01  -  22 Aug 2021 20:20  --------------------------------------------------------  IN: 1477.6 mL / OUT: 690 mL / NET: 787.6 mL      Mode: AC/ CMV (Assist Control/ Continuous Mandatory Ventilation)  RR (machine): 16  TV (machine): 450  FiO2: 40  PEEP: 5  ITime: 1  MAP: 8.7  PIP: 16      PHYSICAL EXAM:  GENERAL: patient intubated, sedated  EYES: EOMI, PERRLA, conjunctiva and sclera clear  NECK: Supple, No JVD, Normal thyroid  HEART: Regular rate and rhythm; No murmurs, rubs, or gallops  RESPIRATORY: CTA B/L, No W/R/R  ABDOMEN: Soft, tender, Nondistended; midline scar  NEUROLOGY: unable to assess, prior to intubation no focal deficits, moving all extremities  EXTREMITIES:  +1 pulses palpable on b/l LE; b/l radial pulses present on doppler but not palpable; fingers cyanotic; 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      LABS:                        8.7    11.98 )-----------( 153      ( 22 Aug 2021 04:28 )             27.5     08-    146<H>  |  114<H>  |  26<H>  ----------------------------<  113<H>  3.9   |  21<L>  |  0.62    Ca    8.2<L>      22 Aug 2021 04:28  Phos  2.9       Mg     1.9         TPro  5.2<L>  /  Alb  2.1<L>  /  TBili  0.5  /  DBili  x   /  AST  79<H>  /  ALT  250<H>  /  AlkPhos  289<H>      PT/INR - ( 21 Aug 2021 09:15 )   PT: 20.7 sec;   INR: 1.77          PTT - ( 21 Aug 2021 09:15 )  PTT:29.0 sec  Urinalysis Basic - ( 22 Aug 2021 17:38 )    Color: Yellow / Appearance: Clear / S.025 / pH: x  Gluc: x / Ketone: NEGATIVE  / Bili: Negative / Urobili: 0.2 E.U./dL   Blood: x / Protein: 30 mg/dL / Nitrite: NEGATIVE   Leuk Esterase: NEGATIVE / RBC: < 5 /HPF / WBC < 5 /HPF   Sq Epi: x / Non Sq Epi: 0-5 /HPF / Bacteria: Present /HPF      CAPILLARY BLOOD GLUCOSE      POCT Blood Glucose.: 244 mg/dL (22 Aug 2021 17:36)  POCT Blood Glucose.: 61 mg/dL (22 Aug 2021 17:29)  POCT Blood Glucose.: 81 mg/dL (22 Aug 2021 12:19)  POCT Blood Glucose.: 97 mg/dL (21 Aug 2021 23:52)    ABG - ( 21 Aug 2021 04:48 )  pH, Arterial: 7.35  pH, Blood: x     /  pCO2: 33    /  pO2: 100   / HCO3: 18    / Base Excess: -6.4  /  SaO2: 98.6                Consultant(s) Notes Reviewed:  [x ] YES  [ ] NO    MEDICATIONS  (STANDING):  chlorhexidine 0.12% Liquid 15 milliLiter(s) Oral Mucosa every 12 hours  chlorhexidine 2% Cloths 1 Application(s) Topical <User Schedule>  dextrose 40% Gel 15 Gram(s) Oral once  dextrose 5%. 1000 milliLiter(s) (50 mL/Hr) IV Continuous <Continuous>  dextrose 5%. 1000 milliLiter(s) (100 mL/Hr) IV Continuous <Continuous>  dextrose 50% Injectable 25 Gram(s) IV Push once  dextrose 50% Injectable 12.5 Gram(s) IV Push once  dextrose 50% Injectable 25 Gram(s) IV Push once  fentaNYL   Infusion 0.5 MICROgram(s)/kG/Hr (2.45 mL/Hr) IV Continuous <Continuous>  glucagon  Injectable 1 milliGRAM(s) IntraMuscular once  insulin lispro (ADMELOG) corrective regimen sliding scale   SubCutaneous every 6 hours  norepinephrine Infusion 0.05 MICROgram(s)/kG/Min (2.3 mL/Hr) IV Continuous <Continuous>  pantoprazole  Injectable 40 milliGRAM(s) IV Push every 12 hours  piperacillin/tazobactam IVPB.. 3.375 Gram(s) IV Intermittent every 6 hours  propofol Infusion 10 MICROgram(s)/kG/Min (3.26 mL/Hr) IV Continuous <Continuous>  vancomycin  IVPB 1250 milliGRAM(s) IV Intermittent every 12 hours  vasopressin Infusion 0.03 Unit(s)/Min (1.8 mL/Hr) IV Continuous <Continuous>    MEDICATIONS  (PRN):  sodium chloride 0.9% lock flush 10 milliLiter(s) IV Push every 1 hour PRN Pre/post blood products, medications, blood draw, and to maintain line patency      Care Discussed with Consultants/Other Providers [ x] YES  [ ] NO    RADIOLOGY & ADDITIONAL TESTS:

## 2021-08-23 NOTE — PROGRESS NOTE ADULT - REASON FOR ADMISSION
pulmonary embolism

## 2021-08-23 NOTE — PROCEDURE NOTE - NSICDXPROCEDURE_GEN_ALL_CORE_FT
PROCEDURES:  Tracheal intubation 19-Aug-2021 11:07:15  Brandon Lerma  
PROCEDURES:  Insertion of intravenous catheter with ultrasound guidance 23-Aug-2021 12:09:38  Anthony Lopez

## 2021-08-23 NOTE — CONSULT NOTE ADULT - CONSULT REQUESTED DATE/TIME
23-Aug-2021
19-Aug-2021 14:14
19-Aug-2021 10:22
20-Aug-2021 05:40
21-Aug-2021 08:30
22-Aug-2021
19-Aug-2021 07:09

## 2021-08-23 NOTE — CONSULT NOTE ADULT - REASON FOR ADMISSION
pulmonary embolism

## 2021-08-23 NOTE — PROGRESS NOTE ADULT - ATTENDING COMMENTS
Initially admitted with severe COPD with current smoking a/w SOB and new PE. Hemorraghic shock after starting hep gtt now improving. CT a/p with IV contrast suggestive of prostate malignancy versus evolving infection but favor malignancy given visualized lytic lesions; when extubated will need collateral on history and if this is a new diagnosis of prostate CA; ischemic gut not seen on imaging. EGD with large antral ulcer. Will continue to hold hep gtt for now; will need to consider IVC filter once critical illness resolved. Keep Hb > 7. Daily pressure support trials.
acute hypoxic respiratory failure 2/2 PE and hemorrhagic shock. persistnet fevers. urology consult for ?prostate abscess on CT. change groin line.   c/w antibiotics.
#Anemia  With acute drop in H/H from 8.0 to 6.3, noted this morning during RR. BUN/Cr ratio elevated, c/f UGIB. On ASA 81 mg daily at home for CAD, unknown if on NSAIDS or AC at home.   -CT A/P (8/18) - with no e/o mesenteric ischemia. Pyelonephritis b/l. Bladder wall thickening c/w cystitis. Heterogeneous prostate with areas of low-density, ?infection, Extensive blastic osseous lesions, c/w metastatic prostate cancer.Advanced atherosclerotic disease.  -s/p EGD (8/19) - notable for Pablo Class IIb ulcer, approximately 15 mm in size, with no active bleeding. Noted in the antrum.   -H/H stable overnight, pending trend in H/H, will consider repeat EGD early next week  -Reticulocyte index 1.96, c/w hypoproliferative process  -No utility with obtaining Fe studies as has already received pRBC transfusions  -Continue to closely monitor H/H  -Maintain active T&S  -Transfusion goal as per primary team  -2 large bore IVs    #Alk Phosphatase elevation  Alk Phos elevated on admission, unclear if hepatobiliary vs osseous in origin (likely the latter, in light osseous blastic lesions noted on CT imaging).  - GGT level wnl so ALP elevation not related to liver
#Anemia  With acute drop in H/H from 8.0 to 6.3, noted this morning during RR. BUN/Cr ratio elevated, c/f UGIB. On ASA 81 mg daily at home for CAD, unknown if on NSAIDS or AC at home.   -CT A/P (8/18) - with no e/o mesenteric ischemia. Pyelonephritis b/l. Bladder wall thickening c/w cystitis. Heterogeneous prostate with areas of low-density, ?infection, Extensive blastic osseous lesions, c/w metastatic prostate cancer.Advanced atherosclerotic disease.  -s/p EGD (8/19) - notable for Pablo Class IIb ulcer, approximately 15 mm in size, with no active bleeding. Noted in the antrum.   -H/H stable overnight, pending trend in H/H, will consider repeat EGD early next week  -Reticulocyte index 1.96, c/w hypoproliferative process  -No utility with obtaining Fe studies as has already received pRBC transfusions  -Continue to closely monitor H/H  -Maintain active T&S  -Transfusion goal as per primary team  -2 large bore IVs    #Transaminitis  With uptrend in AST/ALT overnight, with anticipated further uptrend in the setting of shock 2/2 GIB/hypovolemia. T bilirubin normal, unlikely obstructive process.  -Daily CMP and Coags   -If with uptrend in T bilirubin, would recommend obtaining Abdominal US with dopplers    #Alk Phosphatase elevation  Alk Phos elevated on admission, unclear if hepatobiliary vs osseous in origin (likely the latter, in light osseous blastic lesions noted on CT imaging).  -Please obtain GGT level
#Anemia  With acute drop in H/H from 8.0 to 6.3, noted this morning during RR. BUN/Cr ratio elevated, c/f UGIB. On ASA 81 mg daily at home for CAD, unknown if on NSAIDS or AC at home.   -CT A/P (8/18) - with no e/o mesenteric ischemia. Pyelonephritis b/l. Bladder wall thickening c/w cystitis. Heterogeneous prostate with areas of low-density, ?infection, Extensive blastic osseous lesions, c/w metastatic prostate cancer.Advanced atherosclerotic disease.  -s/p EGD (8/19) - notable for Pablo Class IIb ulcer, approximately 15 mm in size, with no active bleeding. Noted in the antrum.   -Reticulocyte index 1.96, c/w hypoproliferative process  -No utility with obtaining Fe studies as has already received pRBC transfusions  -Continue to closely monitor H/H  -Maintain active T&S  -Transfusion goal as per primary team  -2 large bore IVs  - given Hgb stable and no episodes of melena and overall clinical condition, would defer further endoscopic evaluation     #Alk Phosphatase elevation  Alk Phos elevated on admission, unclear if hepatobiliary vs osseous in origin (likely the latter, in light osseous blastic lesions noted on CT imaging).  - GGT level wnl so ALP elevation not related to liver
Initially admitted with severe COPD with current smoking a/w SOB and new PE. Acute decompensation with drop in Hb and rise in lactate requiring code fusion as well as emergent intubation and line placement. CT a/p with IV contrast suggestive of prostate malignancy versus evolving infection but favor malignancy given visualized lytic lesions; if extubated will need collateral on history and if this is a new diagnosis of prostate CA; ischemic gut not seen on imaging. EGD with large antral ulcer. Most likely in hemorrhagic shock 2/2 bleeding ulcer in the setting of starting hep gtt. Will hold hep gtt for now; will need to consider IVC filter once critical illness resolved. Keep Hb > 7.
Initially admitted with severe COPD with current smoking a/w SOB and new PE. Hemorraghic shock after starting hep gtt now improving. EGD with large antral ulcer. CT a/p with IV contrast suggestive of prostate malignancy versus evolving infection but favor malignancy given visualized lytic lesions. Hemorrhagic shock improved and he was getting better but overnight has an acute increase in pressor requirements with new multiorgan failure; concerning for evolving PE given no AC. Send for CT chest angio for evaluation. C/w broad spectrum abx. Prognosis guarded; no family/friends found.
acute hypoxic respiratory failure 2/2 PE and hemorrhagic shock. overnight increase in pressor requirements, unclear reason ? 2/2 increase in sedation. bedside ECHO with no evidence of RV failure. lower profol, stop vasopressin. right radial pulses with non palpable and non dopplerable during round. left radial a-line still reads well. both hands cyanotic. vascular surgery consult.
Complex medical decision making in the setting of critical illness and undiagnosed metastatic malignancy.    Further attempts to identify a surrogate decision maker unsuccessful. Will need to SW assistance in finding someone if patient is unable to be extubated. If declining and death appears imminent, 2PC DNR is a possibility.    Patient assessed on 08-21-21 to manage: GOC/Advanced Directives. 31 Minutes; Start: 3:00PM,End: 3:31PM, Non-Face-to-Face prolonged service provided that relates to Face-to-Face care that has occurred and ongoing patient management, including one or more of the following: - Reviewed documentation from other physicians and other health care professional services - Reviewed other medical records and diagnostic / radiology study results - Coordination with patient's support system    Will continue to follow for ongoing GOC discussion. For new or uncontrolled symptoms, please call Palliative Care at [phone]. The service is available 24/7 (including nights & weekends) to provide symptom management recommendations over the phone as appropriate    Patient seen and evaluated with Dr. Hutton, Internal Medicine Resident on Palliative Care rotation, during bedside rounds. Agree with above findings and recommendations, edited documentation as appropriate to reflect the plan of care discussed with patient and myself.

## 2021-08-23 NOTE — PROCEDURE NOTE - NSPROCDETAILS_GEN_ALL_CORE
location identified, draped/prepped, sterile technique used/blood seen on insertion/dressing applied/flushes easily/secured in place/sterile technique, catheter placed
location identified, draped/prepped, sterile technique used, needle inserted/introduced/positive blood return obtained via catheter/connected to a pressurized flush line/sutured in place/hemostasis with direct pressure, dressing applied
location identified, draped/prepped, sterile technique used, needle inserted/introduced/positive blood return obtained via catheter/connected to a pressurized flush line/sutured in place/hemostasis with direct pressure, dressing applied/all materials/supplies accounted for at end of procedure
guidewire recovered/lumen(s) aspirated and flushed/sterile dressing applied/sterile technique, catheter placed/ultrasound guidance with use of sterile gel and probe cove
guidewire recovered/lumen(s) aspirated and flushed/sterile dressing applied/sterile technique, catheter placed/ultrasound guidance with use of sterile gel and probe cove
patient pre-oxygenated, tube inserted, placement confirmed

## 2021-08-23 NOTE — PROCEDURE NOTE - NSINDICATIONS_GEN_A_CORE
arterial puncture to obtain ABG's/critical patient/monitoring purposes
critical illness
critical patient/monitoring purposes/transfusion
critical illness/emergency venous access/volume resuscitation
other
respiratory distress

## 2021-08-23 NOTE — PROGRESS NOTE ADULT - SUBJECTIVE AND OBJECTIVE BOX
**incomplete** OVERNIGHT EVENTS: Pt had blood sugar of 27 given 2 amps of D50. Pt increasing levo requirements.    SUBJECTIVE / INTERVAL HPI: Pt intubated and sedated.    VITAL SIGNS:  Vital Signs Last 24 Hrs  T(C): 37.3 (23 Aug 2021 09:20), Max: 39.3 (22 Aug 2021 18:00)  T(F): 99.1 (23 Aug 2021 09:20), Max: 102.7 (22 Aug 2021 18:00)  HR: 119 (23 Aug 2021 12:55) (116 - 137)  BP: 127/67 (23 Aug 2021 12:00) (81/61 - 128/96)  BP(mean): 88 (23 Aug 2021 12:00) (62 - 109)  RR: 16 (23 Aug 2021 12:55) (14 - 26)  SpO2: 95% (23 Aug 2021 12:55) (87% - 99%)  I&O's Summary    22 Aug 2021 07:01  -  23 Aug 2021 07:00  --------------------------------------------------------  IN: 2469.8 mL / OUT: 1620 mL / NET: 849.8 mL    23 Aug 2021 07:01  -  23 Aug 2021 13:25  --------------------------------------------------------  IN: 538.4 mL / OUT: 55 mL / NET: 483.4 mL        PHYSICAL EXAM:  GENERAL: patient intubated, sedated  EYES: EOMI, PERRLA, conjunctiva and sclera clear  NECK: Supple, No JVD, Normal thyroid  HEART: Regular rate and rhythm; No murmurs, rubs, or gallops  RESPIRATORY: CTA B/L, No W/R/R  ABDOMEN: Soft, tender, Nondistended; midline scar  NEUROLOGY: intubated and sedated.   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 R sided edema of the forearm and hand.     MEDICATIONS:  MEDICATIONS  (STANDING):  chlorhexidine 0.12% Liquid 15 milliLiter(s) Oral Mucosa every 12 hours  chlorhexidine 2% Cloths 1 Application(s) Topical <User Schedule>  dextrose 40% Gel 15 Gram(s) Oral once  dextrose 5%. 1000 milliLiter(s) (50 mL/Hr) IV Continuous <Continuous>  dextrose 5%. 1000 milliLiter(s) (100 mL/Hr) IV Continuous <Continuous>  dextrose 5%. 1000 milliLiter(s) (90 mL/Hr) IV Continuous <Continuous>  dextrose 50% Injectable 25 Gram(s) IV Push once  dextrose 50% Injectable 12.5 Gram(s) IV Push once  dextrose 50% Injectable 25 Gram(s) IV Push once  fentaNYL   Infusion 0.5 MICROgram(s)/kG/Hr (2.45 mL/Hr) IV Continuous <Continuous>  glucagon  Injectable 1 milliGRAM(s) IntraMuscular once  insulin lispro (ADMELOG) corrective regimen sliding scale   SubCutaneous every 6 hours  norepinephrine Infusion 0.05 MICROgram(s)/kG/Min (2.3 mL/Hr) IV Continuous <Continuous>  pantoprazole  Injectable 40 milliGRAM(s) IV Push every 12 hours  piperacillin/tazobactam IVPB.. 3.375 Gram(s) IV Intermittent every 6 hours  propofol Infusion 10 MICROgram(s)/kG/Min (3.26 mL/Hr) IV Continuous <Continuous>  vancomycin  IVPB 1250 milliGRAM(s) IV Intermittent every 12 hours  vasopressin Infusion 0.03 Unit(s)/Min (1.8 mL/Hr) IV Continuous <Continuous>    MEDICATIONS  (PRN):  sodium chloride 0.9% lock flush 10 milliLiter(s) IV Push every 1 hour PRN Pre/post blood products, medications, blood draw, and to maintain line patency      ALLERGIES:  Allergies    No Known Allergies    Intolerances        LABS:                        9.1    8.01  )-----------( 136      ( 23 Aug 2021 05:14 )             31.2     08-23    147<H>  |  113<H>  |  30<H>  ----------------------------<  148<H>  4.7   |  22  |  0.86    Ca    8.2<L>      23 Aug 2021 05:14  Phos  5.0       Mg     2.1         TPro  5.2<L>  /  Alb  1.9<L>  /  TBili  0.6  /  DBili  x   /  AST  107<H>  /  ALT  195<H>  /  AlkPhos  232<H>        Urinalysis Basic - ( 22 Aug 2021 17:38 )    Color: Yellow / Appearance: Clear / S.025 / pH: x  Gluc: x / Ketone: NEGATIVE  / Bili: Negative / Urobili: 0.2 E.U./dL   Blood: x / Protein: 30 mg/dL / Nitrite: NEGATIVE   Leuk Esterase: NEGATIVE / RBC: < 5 /HPF / WBC < 5 /HPF   Sq Epi: x / Non Sq Epi: 0-5 /HPF / Bacteria: Present /HPF      CAPILLARY BLOOD GLUCOSE      POCT Blood Glucose.: 180 mg/dL (23 Aug 2021 11:35)      RADIOLOGY & ADDITIONAL TESTS: Reviewed.

## 2021-08-23 NOTE — CHART NOTE - NSCHARTNOTEFT_GEN_A_CORE
**incomplete note** Patient was admitted 8/18/21 with c/o SOB 2/2 acute subsegmental PE and transferred to MICU for acute hypoxic respiratory failure 2/2 hemorrhagic shock 2/2 GIB; now with multisystem organ failure and increasing vasopressor requirements.    *********CLINICAL IMPACT TEAM CONTRIBUTION OF CARE NOTE*********    Code blue initially called overhead by nursing staff @ 2115 due to asystole. Per RN, patient began to tena down and became asystolic. Once writer arrived to the bedside, ventilation via ambu bag was being administered and CPR was started. ACLS protocol was initiated and followed throughout. Patient achieved ROSC after 10 minutes to sinus bradycardia but became pulseless again after about 2 minutes. Patient was coded for a total of 23 minutes (4258-8534) with a total of Epinephrine x 7 given, as well as bicarb x 4 and calcium chloride x 1. During each pulse check patient was asystolic and PEA arrest.     Dr. Maloney at bedside, POCUS with no cardiac activity. After 23 minutes of high-quality chest compressions and ACLS, without return of spontaneous pulse, code blue ended. Time of expiration 2138. Absent breath and cardiac sounds. No response to noxious stimuli. Unable to contact patient's brother due to non-working phone number.

## 2021-08-23 NOTE — PROCEDURE NOTE - PROCEDURE DATE TIME, MLM
19-Aug-2021 08:36
23-Aug-2021 10:45
19-Aug-2021
20-Aug-2021 17:26
22-Aug-2021 12:23
19-Aug-2021 08:45

## 2021-08-23 NOTE — DISCHARGE NOTE FOR THE EXPIRED PATIENT - OTHER SIGNIFICANT FINDINGS
Called to see patient for cardiac arrest. CPR was attempted for 10mins, ROSC was then achieved for 2min, however went into CP arrest again, CPR was resumed for 6mins. Overall received 7 epi, 4 bicarb, 1 CaCl. Pronounced dead at 21:38.    On exam the patient did not respond to verbal or physical stimuli. Absent heart and breath sounds. Absent peripheral pulses. Pupils were fixed and dilated. Dr. Maloney notified. Next of kin Antwan Estrada (Brother) [phone] number not in service, unable to inform. This patient had ACLS protocol for more than 20 mins, Bedside US done during the code showed the heart was at standstill.

## 2021-08-23 NOTE — CONSULT NOTE ADULT - SUBJECTIVE AND OBJECTIVE BOX
Consult requested by: Shady Hutton	    Role: MD	        Service: Resident  Attending: Maria Dolores Schmitt MD  Consultant: Aracely Sotelo MS, [facility] (Medical Ethicist)                             Contact #s: [phone] (c)  [phone] (o)  Consult purpose:  To assist the healthcare team in an ethical dilemma of an 81-year-old male admitted for pulmonary embolism, now intubated and sedated secondary to respiratory failure, currently with guarded prognosis and unknown surrogacy.  										  Clinical summary: This is an 81-year-old German male (hard of hearing) with a past medical history of CAD (status post 4 vessel stent) and asthma/COPD (not on home oxygen, current smoker), not vaccinated for COVID19 who presented to [facility] ED after several days of worsening shortness of breath. Of note, patient recently presented to [facility] ED on 8/13/21 (under alternate MR# [id number] and different spelling of first name) for similar symptoms and subsequently left against medical advice on 8/14/21, stating he wanted to go to [facility]. Patient reports that his symptoms are similar to when he has an asthma/COPD exacerbation. Patient denies any prolonged periods of immobility, recent activity, chest pain, leg pain, or non-compliance with his home inhalers. Patient is a current everyday cigarette smoker (10cigs/day x 70 years) but states he "quit yesterday." He admits to a 20lb weight loss over the past month. CT pulmonary embolus study (CTPE) on 8/18 revealed an acute PE in segmental branch of RUL without evidence of right heart strain and evidence of a 6mm nodule in RLL that is larger than 4mm nodular density in prior study. 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 Also noted that patient informed of likely cancer diagnosis and states that he is not surprised given his extensive smoking history. Patient admitted to medicine floor for management. On 8/19, patient with increased work of breathing, in respiratory distress requiring nonrebreather then BiPAP, likely due to acute pulmonary embolism. EKG was sinus tachycardia, troponin negative. Hemoglobin decreased from 8 to 6.3 and patient was consented for blood transfusion. At the same time, patient was asked if he would be agreeable to intubation and he stated yes. Patient had a rapid response called and was subsequently intubated. Patient transferred to MICU for further management of acute hypoxic respiratory failure and concern for possible ischemic bowel as patient reported abdominal pain with compensated metabolic acidosis with highly elevated lactate on labs. CTA abdomen negative for mesenteric ischemia, however notable for extensive blastic osseous lesions, consistent with metastatic prostate cancer. GI consulted on 8/19 for consideration of endoscopic evaluation. On 8/19, patient found to be in hemorrhagic shock secondary to gastric ulcer (Pablo Class llb ulcer, approximately 15mm in size) found on EGD. EGD on 8/19 also showed no visible active bleeding, notable for residual dark blood within lumen. ECHO on 8/19 showed ejection fraction of 60-65%, no pertinent findings. On 8/20 patient underwent CPAP trial with result of tachypnea, placed back on venitlatory support. Vascular Surgery consulted on 8/21 for nonpalpable and non-dopperable radial pulses. On exam by team, patient was found to have a dopplerable monophasic right radial and a left A line that was still reading a pulse. On exam by team, fingers were cyanotic. As per Vascular team, no vascular intervention currently needed. Urology consulted on 8/22 for prostate abscess on CT imaging. Patient has a history of an enlarged prostate from a CT in 2007. MRI needed to better evaluate prostate. Patient likely an IVC filter candidate once hemodynamically stable. Currently, patient remains intubated, sedated and in septic shock. Patient lacks decision making capacity given his medical condition. As per policy, two physician consents have been obtained for emergent treatments thus far. Palliative on consult. Patient presently in acute hypoxic respiratory failure secondary to hemorrhagic shock secondary to GI bleed now with an acute pulmonary embolism and metastatic malignancy and increasing vaso pressor requirements. Ethics consult initiated to identify appropriate surrogate decision maker to determine plan of care for a potential unbefriended patient.    Prognosis Estimate (survival in days, wks, mos, yrs):	 Guarded				  Patient Decision-Making Capacity:    Has capacity 	  Lacks capacity (patient is incapacitated due to being intubated and sedated)  Patient Aware of:  Diagnosis:   Yes    No   Unknown    Prognosis:   Yes    No   Unknown       Name of medical decision-maker should patient lack capacity: Antwan Estrada  Relationship: Brother  (CANNOT BE LOCATED. SEE DISCUSSION SECTION)  Role:   Health Care Proxy      Legal Surrogate   Contact #(s):   Evidence of Patient’s Preference of Life-Sustaining Treatment (Written or Oral): None  Resuscitation status:  DNR:  Yes   No      DNI:  Yes   No    Discussions:   Discussion with Dr. Hutton (Internal Medicine Resident) on 8/23/21 (11:15 – 11:30): Reviewed patient’s case and clinical condition. Patient seems to be unbefriended as clinical team has been unsuccessful with locating any surrogate decision maker. Case management contacted patient’s emergency contact, but number is non-working. Listed address in chart in [facility]. Patient has no PCP listed. Team suspecting patient may have large blood clot in lung, currently pending imaging. If patient is found to have a massive clot, he pay need TPA. Ethics informed that in the absence of a surrogate and patient remains incapacitated, can do 2 PC for major/emergent medical interventions. In this case, TPA would be considered emergent and major intervention.  Discussion with FRANKIE Mack on 8/23/21 (11:50): Awaiting callback from FRANKIE to see if patient has any belongings (phone/wallet) that will lend more information.  Discussion with FRANKIE Mack on 8/23/21 (15:30 – 15:40): Patient found to have another MR#: [id number], which lists an address and insurance information. FRANKIE reached out to [facility], where patient had a recent admission. On file, they had a contact number for patient’s nephew (non-working number) and a friend. Should the friend be reachable, friend would be an appropriate decision maker according to the [facility] Family Healthcare Decision Act.    Bioethics analysis:  The central ethical issue that exists in this case is (A) respecting the patient’s autonomy versus (B) the providers’ desire for beneficence and non-maleficence. The conflict that exists in this situation is one hospital clinicians infrequently encounter in acutely ill patients who have guarded prognosis for recovery but are without family members or surrogates to share decisions regarding aggressive life-sustaining treatments (LST’s). Mr. Estrada is acutely ill and currently his surrogate has been unable to be reached after thorough due diligence.    At one pole is the bioethical principle of autonomy- here needing to be determined. In tension at the other pole is the physician’s duty of non-maleficence – with interventions that appear to neither have little long-term benefit nor restore a functional quality of life for a specific patient and may increase the patient’s suffering or prolong the dying process.  Clinicians may variably interpret the principle of beneficence ["do good"] in this kind of situation, depending on their own values about the commitment to sustain life.     Medical decision making for patients with neither decision-making capacity (DMC) nor a surrogate decision-maker presents an ethical challenge for healthcare providers because there is no way to obtain informed consent for treatment. This is particularly so when these decisions involve invasive/life-threatening procedures or withholding or withdrawing life-sustaining treatments and providing appropriate palliative care. In this case, the healthcare team is commended for their virtue and invoking justice – by providing fairness and equitable care while attempting to locate a surrogate. Here, there may be no surrogate to assist the medical team using the shared decision-making model to determine the level of care going forward.    The bioethical principle of beneficence calls on clinicians to respect patient autonomy and to honor and preserve Mr. Estrada’ sense of dignity and personhood, his moral status. Clinicians may variably argue that beneficence calls for further aggressive interventions, however beneficence aims to promote the best possible health or quality of living or dying: with aggressive interventions when these help prolong life with "good quality", with comfort care when LST’s are not possible and the aim is to achieve the best "quality of dying." The clinician’s duty to non-maleficence means avoiding medical interventions whose risk and burden exceeds their benefit ("first do no harm"). As this patient lacks capacity, it is appropriate for protection of his autonomy.    The 2010 [facility] Family Health Care Decisions Act (FHCDA)1 addresses the situation of a sick individual who lacks capacity and has no available surrogate and needs a plan of care. The CDA requires hospitals, after a patient is admitted, to determine if the patient has a health care agent, guardian, or a person who can serve as the patient’s surrogate. If the patient has no such person and lacks capacity, the hospital must identify, to the extent practical, the patient’s wishes and preferences about pending health care decisions.  WHERE THE PATIENT CONTINUES TO BE INCAPACITATED, THE ATTENDING PHYSICIAN MAY AUTHORIZE MAJOR MEDICAL TREATMENT AFTER CONSULTATION AND CONCURRENCE WITH THE PATIENT’S HEALTH CARE TEAM AND AN INDEPENDENT PHYSICIAN NOT INVOLVED WITH THE PATIENT’S CARE CONCURS WITH THE TREATMENT. In this case, the patient has a guarded prognosis and is currently incapacitated from making medical decisions. At this time, there is no known family or other surrogate decision makers. If the patient’s brother or other surrogate (friend) is found or if the patient is able to regain capacity to name an alternative surrogate, it is s/he who will make medical decisions on the patient’s behalf, as either is an appropriate decision maker according to the [facility].

## 2021-08-23 NOTE — PROCEDURE NOTE - NSPOSTCAREGUIDE_GEN_A_CORE
Verbal/written post procedure instructions were given to patient/caregiver/Instructed patient/caregiver regarding signs and symptoms of infection/Keep the cast/splint/dressing clean and dry/Care for catheter as per unit/ICU protocols
Care for catheter as per unit/ICU protocols

## 2021-08-23 NOTE — PROCEDURE NOTE - NSPROCNAME_GEN_A_CORE
Peripheral Line Insertion
Tracheal Intubation
Arterial Puncture/Cannulation
Arterial Puncture/Cannulation
Central Line Insertion
Central Line Insertion

## 2021-08-23 NOTE — CHART NOTE - NSCHARTNOTEFT_GEN_A_CORE
Informed by primary ICU team that there is clinical suspicion for advancement of existing pulmonary embolism that may be contributing to pt's active respiratory failure. Currently pending CTA w/ PE protocol to confirm. Due to the absence of established legal surrogate and no known contacts, Ethics team was consulted who confirmed that it would appropriate to perform two physician consent for major medical interventions, that in this case, would include tPA administration if ultimately determined to be clinically indicated.    UPDATE: Informed by Ethics team that patient determined to have alternate MR# [id number] w/ recent [facility] admission and AMA. SW team contacted who were able locate a potential friend based on contact number listed in previous chart. Primary team made aware, and currently confirmed with Ethic team that if team able to reach friend, this person may serve as surrogate decision maker. In absence of establishing contact with friend, can continue with two physician consent for major/emergent medical interventions.

## 2021-08-23 NOTE — DISCHARGE NOTE FOR THE EXPIRED PATIENT - HOSPITAL COURSE
80 y/o Bulgarian M (hard of hearing) with PMHx CAD (s/p 2 stents), asthma/COPD (current smoker) who presents for several days of worsening shortness of breath. Initially admitted to [facility] for acute hypoxic respiratory failure 2/2 to provoked, acute PE of the RUL without tropinemia or RHS, since stepped up to MICU due to increased WOB and severe abdominal pain on [facility]. Patient intubated and sedated in MICU. Transfused 2U pRBCs due to shock. Patient on levophed for MAP goal >65.  GI consulted. CT abdomen showed no evidence of mesenteric ischemia or perforation. EGD showed Pablo Class IIb ulcer, 15 mm in size antrum ulcer. No intervention during EGD. Patient on protonix, ceftriaxone and flagyl. Subsequent day patient with fevers up to 103.1 with increased pressor requirements including addition of vasopressin. Abx coverage broadened to vanc, zosyn. CXR is unchanged from prior day.  82 y/o German M (hard of hearing) with PMHx CAD (s/p 2 stents), asthma/COPD (current smoker) who presents for several days of worsening shortness of breath. Initially admitted to [facility] for acute hypoxic respiratory failure 2/2 to provoked, acute PE of the RUL without tropinemia or RHS, since stepped up to MICU due to increased WOB and severe abdominal pain on [facility]. Patient intubated and sedated in MICU. Transfused 2U pRBCs due to shock. Patient on levophed for MAP goal >65.  GI consulted. CT abdomen showed no evidence of mesenteric ischemia or perforation. EGD showed Pablo Class IIb ulcer, 15 mm in size antrum ulcer. No intervention during EGD. Patient on protonix, ceftriaxone and flagyl. Subsequent day patient with fevers up to 103.1 with increased pressor requirements including addition of vasopressin. Abx coverage broadened to vanc, zosyn. CXR is unchanged from prior day.  80 y/o Frisian M (hard of hearing) with PMHx CAD (s/p 2 stents), asthma/COPD (current smoker) who presents for several days of worsening shortness of breath. Initially admitted to [facility] for acute hypoxic respiratory failure 2/2 to provoked, acute PE of the RUL without tropinemia or RHS, since stepped up to MICU due to increased WOB and severe abdominal pain on [facility]. Patient intubated and sedated in MICU. Transfused 2U pRBCs due to shock. Patient on levophed for MAP goal >65.  GI consulted. CT abdomen showed no evidence of mesenteric ischemia or perforation. EGD showed Pablo Class IIb ulcer, 15 mm in size antrum ulcer. No intervention during EGD. Patient on protonix, ceftriaxone and flagyl. Subsequent day patient with fevers up to 103.1 with increased pressor requirements including addition of vasopressin. Abx coverage broadened to vanc, zosyn. CXR is unchanged from prior day.     Called to see patient for cardiac arrest. CPR was attempted for 10mins, ROSC was then achieved for 2min, however went into CP arrest again, CPR was resumed for 6mins. Overall received 7 epi, 4 bicarb, 1 CaCl. Pronounced dead at 21:38.    On exam the patient did not respond to verbal or physical stimuli. Absent heart and breath sounds. Absent peripheral pulses. Pupils were fixed and dilated. Dr. Maolney notified. Next of kin Antwan Estrada (Brother) [phone] number not in service, unable to inform.

## 2021-08-23 NOTE — PROGRESS NOTE ADULT - ASSESSMENT
82 y/o [place] M (hard of hearing) with PMHx CAD (s/p 2 stents), asthma/COPD (current smoker) who presented with SOB 2/2 acute subsegmental PE admitted to MICU for acute hypoxic respiratory failure 2/2 hemorrhagic shock 2/2 GIB now with FUO and increasing pressor requirements.    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   -No active issues, continue to monitor.    #Sedation  - propofol was dced  - on fentanyl  - daily SATs and SBTs    CARDIOVASCULAR  #CAD (coronary artery disease).  Patient with CAD, hx of 3 stents many years ago.   - AC held in setting of bleed    #Hemorrhagic shock 2/2 to UGIB  Pt had large antral stomach ulcer that hemorrhaged s/p heparin gtt. Pt initially with downtrending levophed requirements from resolving bleed. However, on 8/20 levo requirements started increasing likely i/s/o of sepsis from possible intra-abdominal source vs. increased propofol. 8/22 increasing levo requirements.  - Titrate levophed to sBP 90s   - Sepsis management as below     RESPIRATORY   #Acute hypoxic respiratory failure 2/2 to hemorrhagic shock   Patient originally stable on 2L NC after being diagnosed with RUL PE. Started on heparin gtt. Admitted to MICU with increased work of breathing not responding to BiPAP. ABG 7.2/18/179.   - intubated, attempted CPAP trial on 8/20 with result of tachypnea, placed back on VC/AC  - ECHO: no pertinent findings, EF 60-65%  - continue CPAP trials    #Acute pulmonary embolism.  Patient with acute PE of RUL without evidence of RHS. 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 Hypoxic to 90% on RA, tachy to 112 on admission. No leg swelling at this time. PERC team activated in hospital, no indication for intervention at this time. LE dopplers negative. 8/22 Hemodynamically unstable with hypotension, tachycardia and increasing pressor requirements.  - no intervention at present due to GI bleed   - likely candidate for IVC when hemodynamically stable  - continue to monitor  - send for CTA with IV contrast to assess for new PE's as cause of hemodynamic instability    #Asthma with COPD.  Current every day smoker who presents with shortness of breath - denies any noncompliance with medications (albuterol PRN and symbicort). Unknown follow up, patient could not verbalize.   -CTPE shows evidence of advanced pulmonary emphysema with e/o airway disease, bronchial wall thickening and GGO in upper lobes   -S/p DuoNebs in ED   -Continue symbicort inpatient, and albuterol PRN inpatient   -Patient may need home oxygen (did not use any prior to admission) - continue to monitor.   -currently intubated    GI  #Gastric Ulcer  Patient with bleeding gastric ulcer found on EGD with GI. Bleed exacerbated by heparin drip started for treatment of PE causing patient to go into hemorrhagic shock. S/p endoscopy showing Pablo Class IIb ulcer, approximately 15 mm in size, with no active bleeding. Noted in the antrum.    - protonix 40 q12  - F/u GI recs, likely scope next week     #NPO  -ISS and q6 fingersticks    ID  #Septic shock/FUO likely 2/2 intra-abdominal infection  Pt spiking fever of 103 on 8/20 with no change in O2 requirements on vent, CXR with no consolidations, UA negative for infection. Given GI bleed possible translocation of bacteria from the GI tract. 2/4 SIRS criteria met.  - Vanc and Zosyn to cover for intra-abdominal infection   - F/u bcx   - trend CBC, lactate, CMP    RENAL   #Urinary retention  Patient arrived with agudelo catheter in place. Prior CT scan in 2007 shows enlarged prostate.   -Outpatient records show recent tamsulosin 0.4mg prescription  -on tamsulosin 0.4mg     #Enlarged prostate  Patient with enlarged prostate on CT findings in 2007 and this admission  - urology consulted  - c/w antibiotics  - consider MRI pelvis for eval of possible abscess     HEMATOLOGY/ONCOLOGY  # Anemia   Hemoglobin 6.3 in AM due to bleeding GI ulcer, likely mixed picture with AOCD in setting of likely malignancy. S/p 2U on 8/19. Hgb staying stable in mid 8s to low 9s, RI showing hypoproliferative.   - Continue to monitor CBC q12h   - Ulcer management as above   - s/p 1u PRBCs after Hgb of 7.5    #Cyanosis  b/l hands noted to be cyanotic on exam with pulses present on doppler but not palpable  - Vascular consulted and no intervention necessary    #Lung cancer vs prostate cancer metastatic to bone  Patient with extensive smoking history who presents with acute PE, likely provoked from underlying malignancy. He recalls a 20 lb weight loss over the past 1 month, denies fever or chills.   -CTPE with evidence of 6mm nodule in RLL that is an increase from 4mm nodular density from study in 2017. Now with extensive bony mets c/w metastatic disease to spine, ribs and sternum.  -CT ab/pelvis shows  prostate is enlarged, measuring 7.0 x 5.1x 4.5 cm, giving calculated volume of 84 cc. The prostate is heterogeneous, with calcification centrally, consistent with hyperplasia  -Patient informed of likely cancer diagnosis, states that he is not surprised given his extensive smoking history   -Consult heme/onc  -Palliative care consulted, f/u recommendations     METABOLIC    # lactic acidosis 2/2 shock (septic vs hemorrhagic vs cardiogenic)  Patient with multiple etiologies including lung CA, PE, COPD  - Lactate 17 > 3.4 > 2.5 > 4.2 >2.8  - POCUS showed decreased LV function  - f/u EKG  - troponins negative  - f/u blood cx    PROPHYLACTIC  FLUIDS: none at this time  ELECTROLYTES: Mg<2, K<4  DIET: NPO  DVT ppx: SCDs  GI ppx: protonix 40 q12  Dispo: MICU  GI PPX: Protonix while intubated  VTE PPX: SCDs  CODE: FULL  DISPO: MICU

## 2021-08-23 NOTE — PROGRESS NOTE ADULT - NUTRITIONAL ASSESSMENT
This patient has been assessed with a concern for Malnutrition and has been determined to have a diagnosis/diagnoses of Severe protein-calorie malnutrition and Underweight (BMI < 19).    This patient is being managed with:   Diet NPO-  Entered: Aug 19 2021  1:40PM    

## 2021-08-23 NOTE — CONSULT NOTE ADULT - ASSESSMENT
Recommendation: Consistent with the ethical principles of beneficence and non-maleficence, it is important that the medical team weigh the risks and benefits of the interventions moving forward, in order to determine the goals of care for the patient. If Mr. Estrada continues to be incapacitated and his surrogate cannot be located, two physicians can consent to proceed with the recommended plan of care, given that it is in the best interest of the patient.    Ethics to remain available for further discussions which might include withholding and/or withdrawing of life sustaining treatments, including DNR, which must follow [facility]s policy as well as [facility] MOLST Checklist #4.    Thank you for this challenging case. Please do not hesitate to call us if there are any questions.   DISCUSSED WITH MEDICAL ETHICS ATTENDINGS: NIDA MELVIN MD & HANNAH LOWE DNP (DIRECTOR OF MEDICAL ETHICS)  MORE THAN 50% OF THE TIME OF THIS CONSULTATION WAS SPENT IN COORDINATION OF CARE OF PATIENT					  REFERENCES: 		  HANANE ZHANG. THE [employer]ILY [employer] DECISIONS ACT.  [facility] HEALTH LAW JOURNAL,2010;15:32-35.  [email or web address]

## 2021-08-24 LAB
CULTURE RESULTS: SIGNIFICANT CHANGE UP
CULTURE RESULTS: SIGNIFICANT CHANGE UP
SPECIMEN SOURCE: SIGNIFICANT CHANGE UP
SPECIMEN SOURCE: SIGNIFICANT CHANGE UP

## 2021-08-28 LAB
CULTURE RESULTS: SIGNIFICANT CHANGE UP
SPECIMEN SOURCE: SIGNIFICANT CHANGE UP

## 2021-09-05 DIAGNOSIS — H91.90 UNSPECIFIED HEARING LOSS, UNSPECIFIED EAR: ICD-10-CM

## 2021-09-05 DIAGNOSIS — R53.2 FUNCTIONAL QUADRIPLEGIA: ICD-10-CM

## 2021-09-05 DIAGNOSIS — I46.9 CARDIAC ARREST, CAUSE UNSPECIFIED: ICD-10-CM

## 2021-09-05 DIAGNOSIS — C61 MALIGNANT NEOPLASM OF PROSTATE: ICD-10-CM

## 2021-09-05 DIAGNOSIS — R65.21 SEVERE SEPSIS WITH SEPTIC SHOCK: ICD-10-CM

## 2021-09-05 DIAGNOSIS — D63.0 ANEMIA IN NEOPLASTIC DISEASE: ICD-10-CM

## 2021-09-05 DIAGNOSIS — Z78.1 PHYSICAL RESTRAINT STATUS: ICD-10-CM

## 2021-09-05 DIAGNOSIS — Z95.5 PRESENCE OF CORONARY ANGIOPLASTY IMPLANT AND GRAFT: ICD-10-CM

## 2021-09-05 DIAGNOSIS — N12 TUBULO-INTERSTITIAL NEPHRITIS, NOT SPECIFIED AS ACUTE OR CHRONIC: ICD-10-CM

## 2021-09-05 DIAGNOSIS — A41.9 SEPSIS, UNSPECIFIED ORGANISM: ICD-10-CM

## 2021-09-05 DIAGNOSIS — R33.8 OTHER RETENTION OF URINE: ICD-10-CM

## 2021-09-05 DIAGNOSIS — R91.1 SOLITARY PULMONARY NODULE: ICD-10-CM

## 2021-09-05 DIAGNOSIS — E87.2 ACIDOSIS: ICD-10-CM

## 2021-09-05 DIAGNOSIS — F17.210 NICOTINE DEPENDENCE, CIGARETTES, UNCOMPLICATED: ICD-10-CM

## 2021-09-05 DIAGNOSIS — G93.40 ENCEPHALOPATHY, UNSPECIFIED: ICD-10-CM

## 2021-09-05 DIAGNOSIS — R06.02 SHORTNESS OF BREATH: ICD-10-CM

## 2021-09-05 DIAGNOSIS — Z79.82 LONG TERM (CURRENT) USE OF ASPIRIN: ICD-10-CM

## 2021-09-05 DIAGNOSIS — I26.93 SINGLE SUBSEGMENTAL PULMONARY EMBOLISM WITHOUT ACUTE COR PULMONALE: ICD-10-CM

## 2021-09-05 DIAGNOSIS — C79.51 SECONDARY MALIGNANT NEOPLASM OF BONE: ICD-10-CM

## 2021-09-05 DIAGNOSIS — I25.10 ATHEROSCLEROTIC HEART DISEASE OF NATIVE CORONARY ARTERY WITHOUT ANGINA PECTORIS: ICD-10-CM

## 2021-09-05 DIAGNOSIS — J96.01 ACUTE RESPIRATORY FAILURE WITH HYPOXIA: ICD-10-CM

## 2021-09-05 DIAGNOSIS — R57.8 OTHER SHOCK: ICD-10-CM

## 2021-09-05 DIAGNOSIS — R63.4 ABNORMAL WEIGHT LOSS: ICD-10-CM

## 2021-09-05 DIAGNOSIS — E43 UNSPECIFIED SEVERE PROTEIN-CALORIE MALNUTRITION: ICD-10-CM

## 2021-09-05 DIAGNOSIS — R57.1 HYPOVOLEMIC SHOCK: ICD-10-CM

## 2021-09-05 DIAGNOSIS — J43.9 EMPHYSEMA, UNSPECIFIED: ICD-10-CM

## 2021-09-05 DIAGNOSIS — K25.4 CHRONIC OR UNSPECIFIED GASTRIC ULCER WITH HEMORRHAGE: ICD-10-CM

## 2022-04-29 NOTE — ED PROVIDER NOTE - CONTACT TIME
Problem: Nutrition/Hydration-ADULT  Goal: Nutrient/Hydration intake appropriate for improving, restoring or maintaining nutritional needs  Description: Monitor and assess patient's nutrition/hydration status for malnutrition  Collaborate with interdisciplinary team and initiate plan and interventions as ordered  Monitor patient's weight and dietary intake as ordered or per policy  Utilize nutrition screening tool and intervene as necessary  Determine patient's food preferences and provide high-protein, high-caloric foods as appropriate       INTERVENTIONS:  - Monitor oral intake, urinary output, labs, and treatment plans  - Assess nutrition and hydration status and recommend course of action  - Evaluate amount of meals eaten  - Assist patient with eating if necessary   - Allow adequate time for meals  - Recommend/ encourage appropriate diets, oral nutritional supplements, and vitamin/mineral supplements  - Order, calculate, and assess calorie counts as needed  - Recommend, monitor, and adjust tube feedings and TPN/PPN based on assessed needs  - Assess need for intravenous fluids  - Provide specific nutrition/hydration education as appropriate  - Include patient/family/caregiver in decisions related to nutrition  Outcome: Progressing     Problem: GASTROINTESTINAL - ADULT  Goal: Minimal or absence of nausea and/or vomiting  Description: INTERVENTIONS:  - Administer IV fluids if ordered to ensure adequate hydration  - Maintain NPO status until nausea and vomiting are resolved  - Nasogastric tube if ordered  - Administer ordered antiemetic medications as needed  - Provide nonpharmacologic comfort measures as appropriate  - Advance diet as tolerated, if ordered  - Consider nutrition services referral to assist patient with adequate nutrition and appropriate food choices  Outcome: Progressing     Problem: PAIN - ADULT  Goal: Verbalizes/displays adequate comfort level or baseline comfort level  Description: Interventions:  - Encourage patient to monitor pain and request assistance  - Assess pain using appropriate pain scale  - Administer analgesics based on type and severity of pain and evaluate response  - Implement non-pharmacological measures as appropriate and evaluate response  - Consider cultural and social influences on pain and pain management  - Notify physician/advanced practitioner if interventions unsuccessful or patient reports new pain  Outcome: Progressing     Problem: DISCHARGE PLANNING  Goal: Discharge to home or other facility with appropriate resources  Description: INTERVENTIONS:  - Identify barriers to discharge w/patient and caregiver  - Arrange for needed discharge resources and transportation as appropriate  - Identify discharge learning needs (meds, wound care, etc )  - Arrange for interpretive services to assist at discharge as needed  - Refer to Case Management Department for coordinating discharge planning if the patient needs post-hospital services based on physician/advanced practitioner order or complex needs related to functional status, cognitive ability, or social support system  Outcome: Progressing     Problem: Knowledge Deficit  Goal: Patient/family/caregiver demonstrates understanding of disease process, treatment plan, medications, and discharge instructions  Description: Complete learning assessment and assess knowledge base    Interventions:  - Provide teaching at level of understanding  - Provide teaching via preferred learning methods  Outcome: Progressing 18-Aug-2021 18:14 18-Aug-2021 18:13

## 2024-04-23 NOTE — PROCEDURAL SAFETY CHECKLIST WITH OR WITHOUT SEDATION - NSSIDESITEMARKD_GEN_ALL_CORE
General: No fever, chills.  Respiratory: + SOB  Cardiac: + chest pain  GI: No abdominal pain, nausea, vomiting  Neuro: No headache  MSK: No muscle pain, joint pain, back pain.  Psych: No known mental health issues.  Endocrine: No heat/cold intolerance, no polyuria/polydipsia.  Heme: No easy bruising or bleeding.  Allergic: No pruritis, dermatitis, or environmental allergies.
done

## 2024-06-19 NOTE — PROCEDURE NOTE - NSTOLERANCE_GEN_A_CORE
Patient tolerated procedure well.
Improved
Patient tolerated procedure well.